# Patient Record
Sex: FEMALE | Race: WHITE | NOT HISPANIC OR LATINO | Employment: OTHER | URBAN - METROPOLITAN AREA
[De-identification: names, ages, dates, MRNs, and addresses within clinical notes are randomized per-mention and may not be internally consistent; named-entity substitution may affect disease eponyms.]

---
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
## 2018-02-14 NOTE — PROGRESS NOTES
"  SUBJECTIVE:   Cher Ibarra is a 17 year old female with Down's who presents to clinic today with her parents for the following health issues:    Concern - callus on R foot  Onset: 6 months    Description:   Lump on R foot with mild pain    Intensity: moderate    Progression of Symptoms:  constant    Accompanying Signs & Symptoms:  none    Previous history of similar problem:   none    Precipitating factors:   Worsened by: none    Alleviating factors:  Improved by: none    Therapies Tried and outcome: none    ROS:  CONSTITUTIONAL: NEGATIVE for fever, chills, change in weight  INTEGUMENTARY/SKIN: NEGATIVE for worrisome rashes, moles or lesions   female: dysmenorrhea   MUSCULOSKELETAL: NEGATIVE for significant arthralgias or myalgia    OBJECTIVE:     /75  Pulse 83  Temp 98.3  F (36.8  C) (Oral)  Ht 4' 11\" (1.499 m)  Wt 149 lb 5 oz (67.7 kg)  SpO2 96%  BMI 30.16 kg/m2  Body mass index is 30.16 kg/(m^2).  GENERAL: alert and no distress  MS: extremities normal- no gross deformities noted  SKIN: no suspicious lesions or rashes  PSYCH: mentation appears normal, affect normal/bright    Diagnostic Test Results:  none     ASSESSMENT/PLAN:   (L84) Callus of foot  (primary encounter diagnosis)  Plan: Reassurance provided. See AVS. Return to clinic as needed     (Q90.9) Down's syndrome  Plan: continue cares; TSH at future visit     (N94.6) Dysmenorrhea  Plan: IUD per gynecology       See Patient Instructions    Paloma Diaz MD  [facility]      "

## 2018-02-14 NOTE — PATIENT INSTRUCTIONS
[facility]    If you have any questions regarding to your visit please contact your care team:       Team Red:   Clinic Hours Telephone Number   Dr. Paloma Covarrubias, NP   7am-7pm  Monday - Thursday   7am-5pm  Fridays  [phone]  (Appointment scheduling available 24/7)    Questions about your visit?   Team Line  [phone]   [facility] [place] [facility] 11am-9pm Monday-Friday Saturday-Sunday- 9am-5pm   [place] - 5pm-9pm Monday-Friday Saturday-Sunday- 9am-5pm  [phone] - [place]   [phone] - [place]       What options do I have for visits at the clinic other than the traditional office visit?  To expand how we care for you, many of our providers are utilizing electronic visits (e-visits) and telephone visits, when medically appropriate, for interactions with their patients rather than a visit in the clinic.   We also offer nurse visits for many medical concerns. Just like any other service, we will bill your insurance company for this type of visit based on time spent on the phone with your provider. Not all insurance companies cover these visits. Please check with your medical insurance if this type of visit is covered. You will be responsible for any charges that are not paid by your insurance.      E-visits via [employer]:  generally incur a $35.00 fee.  Telephone visits:  Time spent on the phone: *charged based on time that is spent on the phone in increments of 10 minutes. Estimated cost:   5-10 mins $30.00   11-20 mins. $59.00   21-30 mins. $85.00     Use [employer] (secure email communication and access to your chart) to send your primary care provider a message or make an appointment. Ask someone on your Team how to sign up for [employer].  For a Price Quote for your services, please call our Consumer Price Line at [phone].      As always, Thank you for trusting us with your health care needs!    What Are  Corns and Calluses?    Corns and calluses are your body s response to friction or pressure against the skin. If your foot rubs the inside of your shoe, the affected area of skin thickens. Or, if a bone is not in the normal position, skin caught between bone and shoe or bone and ground builds up. In either case, the outer layer of skin thickens to protect the foot from unusual pressure. In many cases, corns and calluses look bad but are not harmful. However, more severe corns and calluses may become infected, destroy healthy tissue, or affect foot movement.    Corns  Corns usually grow on top of the foot, often at the toe joint. Corns can range from a slight thickening of skin to a painful soft or hard bump. They often form on top of buckled toe joints (hammer toes). If your toes curl under, corns may grow on the tips of the toes. You may also get a corn on the end of a toe if it rubs against your shoe. Corns can also grow between toes, often between the first and second toes.    Calluses  Calluses grow on the bottom of the foot or on the outer edge of a toe or heel. A callus may spread across the ball of your foot. This type of callus is usually due to a problem with a metatarsal (the long bone at the base of a toe, near the ball of the foot). A pinch callus may grow along the outer edge of the heel or the big toe. Some calluses press up into the foot instead of spreading on the outside. A callus may form a central core or plug of tissue where pressure is greatest.  Date Last Reviewed: 9/21/2015 2000-2017 [employer]. [address] [place] [address]. All rights reserved. This information is not intended as a substitute for professional medical care. Always follow your healthcare professional's instructions.

## 2018-02-19 ENCOUNTER — OFFICE VISIT (OUTPATIENT)
Dept: FAMILY MEDICINE | Facility: CLINIC | Age: 18
End: 2018-02-19
Payer: COMMERCIAL

## 2018-02-19 VITALS
HEART RATE: 83 BPM | OXYGEN SATURATION: 96 % | DIASTOLIC BLOOD PRESSURE: 75 MMHG | WEIGHT: 149.31 LBS | BODY MASS INDEX: 30.1 KG/M2 | HEIGHT: 59 IN | TEMPERATURE: 98.3 F | SYSTOLIC BLOOD PRESSURE: 115 MMHG

## 2018-02-19 DIAGNOSIS — L84 CALLUS OF FOOT: Primary | ICD-10-CM

## 2018-02-19 DIAGNOSIS — N94.6 DYSMENORRHEA: ICD-10-CM

## 2018-02-19 DIAGNOSIS — Q90.9 DOWN'S SYNDROME: ICD-10-CM

## 2018-02-19 PROCEDURE — 99213 OFFICE O/P EST LOW 20 MIN: CPT | Performed by: FAMILY MEDICINE

## 2018-02-19 RX ORDER — AMMONIUM LACTATE 12 G/100G
CREAM TOPICAL 2 TIMES DAILY PRN
COMMUNITY
Start: 2018-02-19

## 2018-02-19 NOTE — NURSING NOTE
"Chief Complaint   Patient presents with     Establish Care     Derm Problem     possible cyst R foot       Initial /75  Pulse 83  Temp 98.3  F (36.8  C) (Oral)  Ht 4' 11\" (1.499 m)  Wt 149 lb 5 oz (67.7 kg)  SpO2 96%  BMI 30.16 kg/m2 Estimated body mass index is 30.16 kg/(m^2) as calculated from the following:    Height as of this encounter: 4' 11\" (1.499 m).    Weight as of this encounter: 149 lb 5 oz (67.7 kg).  Medication Reconciliation: complete   Payam Carrizales MA      "

## 2018-02-19 NOTE — MR AVS SNAPSHOT
After Visit Summary   2/19/2018    Cher Ibarra    MRN: [id number]           Patient Information     Date Of Birth          2000        Visit Information        Provider Department      2/19/2018 1:20 PM Paloma Diaz MD [facility]        Today's Diagnoses     Callus of foot    -  1    Down's syndrome        Dysmenorrhea          Care Instructions    [facility]    If you have any questions regarding to your visit please contact your care team:       Team Red:   Clinic Hours Telephone Number   Dr. Paloma Covarrubias, NP   7am-7pm  Monday - Thursday   7am-5pm  Fridays  [phone]  (Appointment scheduling available 24/7)    Questions about your visit?   Team Line  [phone]   Urgent Care - [place] [facility] and [facility] - 11am-9pm Monday-Friday Saturday-Sunday- 9am-5pm   [place] - 5pm-9pm Monday-Friday Saturday-Sunday- 9am-5pm  [phone] - [place]   [phone] - [place]       What options do I have for visits at the clinic other than the traditional office visit?  To expand how we care for you, many of our providers are utilizing electronic visits (e-visits) and telephone visits, when medically appropriate, for interactions with their patients rather than a visit in the clinic.   We also offer nurse visits for many medical concerns. Just like any other service, we will bill your insurance company for this type of visit based on time spent on the phone with your provider. Not all insurance companies cover these visits. Please check with your medical insurance if this type of visit is covered. You will be responsible for any charges that are not paid by your insurance.      E-visits via Hundo:  generally incur a $35.00 fee.  Telephone visits:  Time spent on the phone: *charged based on time that is spent on the phone in increments of 10 minutes. Estimated cost:   5-10 mins $30.00   11-20 mins.  $59.00   21-30 mins. $85.00     Use [employer] (secure email communication and access to your chart) to send your primary care provider a message or make an appointment. Ask someone on your Team how to sign up for [employer].  For a Price Quote for your services, please call our Consumer Price Line at [phone].      As always, Thank you for trusting us with your health care needs!    What Are Corns and Calluses?    Corns and calluses are your body s response to friction or pressure against the skin. If your foot rubs the inside of your shoe, the affected area of skin thickens. Or, if a bone is not in the normal position, skin caught between bone and shoe or bone and ground builds up. In either case, the outer layer of skin thickens to protect the foot from unusual pressure. In many cases, corns and calluses look bad but are not harmful. However, more severe corns and calluses may become infected, destroy healthy tissue, or affect foot movement.    Corns  Corns usually grow on top of the foot, often at the toe joint. Corns can range from a slight thickening of skin to a painful soft or hard bump. They often form on top of buckled toe joints (hammer toes). If your toes curl under, corns may grow on the tips of the toes. You may also get a corn on the end of a toe if it rubs against your shoe. Corns can also grow between toes, often between the first and second toes.    Calluses  Calluses grow on the bottom of the foot or on the outer edge of a toe or heel. A callus may spread across the ball of your foot. This type of callus is usually due to a problem with a metatarsal (the long bone at the base of a toe, near the ball of the foot). A pinch callus may grow along the outer edge of the heel or the big toe. Some calluses press up into the foot instead of spreading on the outside. A callus may form a central core or plug of tissue where pressure is greatest.  Date Last Reviewed: 9/21/2015 2000-2017 [employer]  "[employer] [address] [place] [address]. All rights reserved. This information is not intended as a substitute for professional medical care. Always follow your healthcare professional's instructions.                Follow-ups after your visit        Follow-up notes from your care team     Return if symptoms worsen or fail to improve, for physical.      Who to contact     If you have questions or need follow up information about today's clinic visit or your schedule please contact [facility] directly at [phone].  Normal or non-critical lab and imaging results will be communicated to you by [employer], letter or phone within 4 business days after the clinic has received the results. If you do not hear from us within 7 days, please contact the clinic through [employer] or phone. If you have a critical or abnormal lab result, we will notify you by phone as soon as possible.  Submit refill requests through [employer] or call your pharmacy and they will forward the refill request to us. Please allow 3 business days for your refill to be completed.          Additional Information About Your Visit        [employer] Information     [employer] lets you send messages to your doctor, view your test results, renew your prescriptions, schedule appointments and more. To sign up, go to [email or web address] . Click on \"Log in\" on the left side of the screen, which will take you to the Welcome page. Then click on \"Sign up Now\" on the right side of the page.     You will be asked to enter the access code listed below, as well as some personal information. Please follow the directions to create your username and password.     Your access code is: [email or web address]  Expires: 2018  1:48 PM     Your access code will  in 90 days. If you need help or a new code, please call your [facility] clinic or [phone].        Care EveryWhere ID     This is your Care EveryWhere ID. This could be used by other organizations to " "access your [place] medical records  [id number]        Your Vitals Were     Pulse Temperature Height Pulse Oximetry BMI (Body Mass Index)       83 98.3  F (36.8  C) (Oral) 4' 11\" (1.499 m) 96% 30.16 kg/m2        Blood Pressure from Last 3 Encounters:   02/19/18 115/75   10/09/17 106/60   10/06/17 111/58    Weight from Last 3 Encounters:   02/19/18 149 lb 5 oz (67.7 kg) (59 %)*   10/09/17 150 lb 12.8 oz (68.4 kg) (63 %)*   10/06/17 150 lb (68 kg) (62 %)*     * Growth percentiles are based on Down Syndrome (2-20 Years) data.              Today, you had the following     No orders found for display       Primary Care Provider Office Phone # Fax #    Paloma Diaz [phone]       [address] [facility] [address]        Equal Access to Services     [facility]: Hadii aad ku hadasho Soomaali, waaxda luqadaha, qaybta kaalmada adeegyada, waxay laureen orellana . So [facility] [phone].    ATENCIÓN: Si habla español, tiene a wadsworth disposición servicios gratuitos de asistencia lingüística. Llame al [phone].    We comply with applicable federal civil rights laws and [place] laws. We do not discriminate on the basis of race, color, national origin, age, disability, sex, sexual orientation, or gender identity.            Thank you!     Thank you for choosing [facility]  for your care. Our goal is always to provide you with excellent care. Hearing back from our patients is one way we can continue to improve our services. Please take a few minutes to complete the written survey that you may receive in the mail after your visit with us. Thank you!             Your Updated Medication List - Protect others around you: Learn how to safely use, store and throw away your medicines at www.disposemymeds.org.          This list is accurate as of 2/19/18  1:48 PM.  Always use your most recent med list.                   Brand Name Dispense Instructions for use Diagnosis    " FOLBECAL 1 MG Tabs      Take 1 tablet by mouth daily        LAC-HYDRIN 12 % cream   Generic drug:  ammonium lactate      Apply topically 2 times daily as needed for dry skin        loratadine 10 MG tablet    CLARITIN     Take 10 mg by mouth daily        melatonin 3 MG tablet      None Entered        mupirocin 2 % ointment    BACTROBAN    30 g    Apply topically 3 times daily as needed    H/O furuncle        MG Caps capsule   Generic drug:  acetylcysteine      Take 500 mg by mouth daily        naproxen 500 MG tablet    NAPROSYN    60 tablet    Take 1 tablet (500 mg) by mouth 2 times daily as needed    Dysmenorrhea       OMEGA-3 FISH OIL PO      Take 1 g by mouth daily        PRISTIQ 25 MG 24 hr tablet   Generic drug:  desvenlafaxine succinate      Take 25 mg by mouth daily        traZODone 50 MG tablet    DESYREL    90 tablet    Take 0.5 tablets (25 mg) by mouth At Bedtime    Sleep disturbance       triamcinolone 0.5 % cream    KENALOG    30 g    Apply  topically 2 times daily. to affected area as directed.    Skin rash       Vitamin D (Cholecalciferol) 1000 UNITS Tabs

## 2018-03-05 ENCOUNTER — OFFICE VISIT (OUTPATIENT)
Dept: FAMILY MEDICINE | Facility: CLINIC | Age: 18
End: 2018-03-05
Payer: COMMERCIAL

## 2018-03-05 VITALS
DIASTOLIC BLOOD PRESSURE: 62 MMHG | OXYGEN SATURATION: 96 % | HEART RATE: 107 BPM | TEMPERATURE: 98 F | WEIGHT: 153.5 LBS | BODY MASS INDEX: 30.95 KG/M2 | HEIGHT: 59 IN | SYSTOLIC BLOOD PRESSURE: 90 MMHG

## 2018-03-05 DIAGNOSIS — E66.9 OBESITY WITHOUT SERIOUS COMORBIDITY, UNSPECIFIED CLASSIFICATION, UNSPECIFIED OBESITY TYPE: ICD-10-CM

## 2018-03-05 DIAGNOSIS — Z23 NEED FOR VACCINATION: ICD-10-CM

## 2018-03-05 DIAGNOSIS — Q90.9 DOWN'S SYNDROME: ICD-10-CM

## 2018-03-05 DIAGNOSIS — F41.1 GENERALIZED ANXIETY DISORDER: ICD-10-CM

## 2018-03-05 DIAGNOSIS — N94.6 DYSMENORRHEA: ICD-10-CM

## 2018-03-05 DIAGNOSIS — Z01.818 PREOP GENERAL PHYSICAL EXAM: Primary | ICD-10-CM

## 2018-03-05 PROCEDURE — 99214 OFFICE O/P EST MOD 30 MIN: CPT | Performed by: FAMILY MEDICINE

## 2018-03-05 RX ORDER — DESVENLAFAXINE 25 MG/1
25 TABLET, EXTENDED RELEASE ORAL DAILY
Qty: 90 TABLET | Refills: 3 | Status: SHIPPED | OUTPATIENT
Start: 2018-03-05 | End: 2019-08-13

## 2018-03-05 RX ORDER — TRAZODONE HYDROCHLORIDE 50 MG/1
25 TABLET, FILM COATED ORAL AT BEDTIME
Qty: 45 TABLET | Refills: 3 | Status: SHIPPED | OUTPATIENT
Start: 2018-03-05 | End: 2019-08-13

## 2018-03-05 ASSESSMENT — PAIN SCALES - GENERAL: PAINLEVEL: NO PAIN (0)

## 2018-03-05 NOTE — PROGRESS NOTES
[facility]  [address] [place] [address]  [phone]  Dept: [phone]    PRE-OP EVALUATION:  Today's date: 3/5/2018    Cher Ibarra (: 2000) presents for pre-operative evaluation assessment as requested by Dr. Parkinson.  She requires evaluation and anesthesia risk assessment prior to undergoing surgery/procedure for treatment of IUD placement.    Proposed Surgery/ Procedure: IUD Placement  Date of Surgery/ Procedure: 3/15/2018   Time of Surgery/ Procedure: 8 am  Hospital/Surgical Facility: [facility]  Fax number for surgical facility: [phone]  Primary Physician: Paloma Diaz  Type of Anesthesia Anticipated: Local    Patient has a Health Care Directive or Living Will:  NO    1. YES - DO YOU HAVE A HISTORY OF HEART ATTACK, STROKE, STENT, BYPASS OR SURGERY ON AN ARTERY IN THE HEAD, NECK, HEART OR LEG? ASD and VSD repair   2. NO - Do you ever have any pain or discomfort in your chest?  3. NO - DO YOU HAVE A HISTORY OF HEART FAILURE   4. NO - Are you troubled by shortness of breath when: walking on the level, up a slight hill or at night?  5. NO - Do you currently have a cold, bronchitis or other respiratory infection?  6. NO - Do you have a cough, shortness of breath or wheezing?  7. NO - Do you sometimes get pains in the calves of your legs when you walk?  8. NO - Do you or anyone in your family have previous history of blood clots?  9. NO - Do you or does anyone in your family have a serious bleeding problem such as prolonged bleeding following surgeries or cuts?  10. YES - HAVE YOU EVER HAD PROBLEMS WITH ANEMIA OR BEEN TOLD TO TAKE IRON PILLS?    11. NO - Have you had any abnormal blood loss such as black, tarry or bloody stools, or abnormal vaginal bleeding?  12. NO - Have you ever had a blood transfusion?  13. YES - HAVE YOU OR ANY OF YOUR RELATIVES EVER HAD PROBLEMS WITH ANESTHESIA?  Mat GM with nausea, mother with nausea   14. NO - Do you have sleep  apnea, excessive snoring or daytime drowsiness?  15. NO - Do you have any prosthetic heart valves?  16. NO - Do you have prosthetic joints?  17. NO - Is there any chance that you may be pregnant?    HPI:   Cher Ibarra is a 17 year old female who presents with dysmenorrhea. Symptom onset has been unchanged for a time period of years. Severity is described as moderate. Course of her symptoms over time is unchanged.    History difficult to obtain due to patient's mental status.    See problem list for active medical problems.  Problems all longstanding and stable, except as noted/documented.  See ROS for pertinent symptoms related to these conditions.                                                                                                  .    MEDICAL HISTORY:     Patient Active Problem List    Diagnosis Date Noted     Obesity 03/05/2018     Priority: Medium     Recurrent boils 07/05/2017     Priority: Medium     Anxiety disorder 07/07/2015     Priority: Medium     Dysmenorrhea 02/24/2014     Priority: Medium     [facility] and started on naprosyn, much help.       Chronic rhinitis 07/02/2012     Priority: Medium     Down's syndrome 11/22/2010     Priority: Medium     [facility]       Sleep disturbance 11/22/2010     Priority: Medium      Past Medical History:   Diagnosis Date     Anxiety      ASD (atrial septal defect) 2000    Repair of ASD/VSD     Chronic rhinitis      Down syndrome      Keratosis pilaris      Obesity 3/5/2018     Recurrent boils      Past Surgical History:   Procedure Laterality Date     REPAIR ATRIAL SEPTAL DEFECT      age 1     REPAIR VENTRICULAR SEPTAL DEFECT      age 1     TONSILLECTOMY & ADENOIDECTOMY      AGE 3     Current Outpatient Prescriptions   Medication Sig Dispense Refill     traZODone (DESYREL) 50 MG tablet Take 0.5 tablets (25 mg) by mouth At Bedtime 45 tablet 3     desvenlafaxine  succinate (PRISTIQ) 25 MG 24 hr tablet Take 1 tablet (25 mg) by mouth daily 90 tablet 3     ammonium lactate (LAC-HYDRIN) 12 % cream Apply topically 2 times daily as needed for dry skin       acetylcysteine (NAC) 500 MG CAPS capsule Take 500 mg by mouth daily       mupirocin (BACTROBAN) 2 % ointment Apply topically 3 times daily as needed 30 g 6     triamcinolone (KENALOG) 0.5 % cream Apply  topically 2 times daily. to affected area as directed. 30 g 0     loratadine (CLARITIN) 10 MG tablet Take 10 mg by mouth daily       Vitamin D, Cholecalciferol, 1000 UNITS TABS        naproxen (NAPROSYN) 500 MG tablet Take 1 tablet (500 mg) by mouth 2 times daily as needed 60 tablet 1     Omega-3 Fatty Acids (OMEGA-3 FISH OIL PO) Take 1 g by mouth daily       MELATONIN 3 MG OR TABS None Entered       Prenatal Ca Carb-B6-B12-FA (FOLBECAL) 1 MG TABS Take 1 tablet by mouth daily       OTC products: None, except as noted above    Allergies   Allergen Reactions     Zithromax [Azithromycin Dihydrate]       Latex Allergy: NO    Social History   Substance Use Topics     Smoking status: Never Smoker     Smokeless tobacco: Never Used     Alcohol use No     History   Drug Use No     Social History     Social History     Marital status: Single     Spouse name: N/A     Number of children: 0     Years of education: N/A     Occupational History      Student     Social History Main Topics     Smoking status: Never Smoker     Smokeless tobacco: Never Used     Alcohol use No     Drug use: No     Sexual activity: No     Other Topics Concern     Parent/Sibling W/ Cabg, Mi Or Angioplasty Before 65f 55m? No     Social History Narrative     Family History   Problem Relation Age of Onset     CEREBROVASCULAR DISEASE Mother      Depression Mother      Anxiety Disorder Mother      Obesity Mother      Anesthesia Reaction Maternal Grandmother      C.A.D. No family hx of      DIABETES No family hx of      Hypertension No family hx of      Breast Cancer No  "family hx of      Prostate Cancer No family hx of      Asthma No family hx of       REVIEW OF SYSTEMS:   CONSTITUTIONAL: NEGATIVE for fever, chills, change in weight  INTEGUMENTARY/SKIN: NEGATIVE for worrisome rashes, moles or lesions  EYES: NEGATIVE for vision changes or irritation  ENT/MOUTH: NEGATIVE for ear, mouth and throat problems  RESP: NEGATIVE for significant cough or SOB  CV: NEGATIVE for chest pain, palpitations or peripheral edema  GI: NEGATIVE for nausea, abdominal pain, heartburn, or change in bowel habits   female: dysmenorrhea  MUSCULOSKELETAL: NEGATIVE for significant arthralgias or myalgia  NEURO: NEGATIVE for weakness, dizziness or paresthesias  ENDOCRINE: NEGATIVE for temperature intolerance, skin/hair changes  HEME: NEGATIVE for bleeding problems  PSYCHIATRIC: HX anxiety  Complete ROS otherwise negative.     EXAM:   BP 90/62  Pulse 107  Temp 98  F (36.7  C) (Oral)  Ht 4' 11\" (1.499 m)  Wt 153 lb 8 oz (69.6 kg)  LMP 03/04/2018  SpO2 96%  BMI 31 kg/m2    GENERAL APPEARANCE: alert, no distress and over weight     EYES: EOMI, PERRL     HENT: ear canals and TM's normal and nose and mouth without ulcers or lesions     NECK: no adenopathy, no asymmetry, masses, or scars and thyroid normal to palpation     RESP: lungs clear to auscultation - no rales, rhonchi or wheezes     CV: regular rates and rhythm, normal S1 S2, no S3 or S4 and no murmur, click or rub     ABDOMEN:  soft, nontender, no HSM or masses and bowel sounds normal     MS: small ganglion cyst left wrist, no evidence of inflammation in joints, FROM in all extremities.     SKIN: no suspicious lesions or rashes     NEURO: MR      PSYCH: affect normal/bright     LYMPHATICS: No cervical adenopathy    DIAGNOSTICS:   EKG: Not indicated due to non-vascular surgery and low risk of event (age <65 and without cardiac risk factors)    Recent Labs   Lab Test 10/15/14   NA  139   POTASSIUM  3.5   CR  1.02        IMPRESSION:   Reason for " surgery/procedure: dysmenorrhea   Diagnosis/reason for consult: Down's     The proposed surgical procedure is considered LOW risk.    REVISED CARDIAC RISK INDEX  The patient has the following serious cardiovascular risks for perioperative complications such as (MI, PE, VFib and 3  AV Block):  No serious cardiac risks  INTERPRETATION: 0 risks: Class I (very low risk - 0.4% complication rate)    The patient has the following additional risks for perioperative complications:  No identified additional risks      ICD-10-CM    1. Preop general physical exam Z01.818    2. Dysmenorrhea N94.6    3. Generalized anxiety disorder F41.1 traZODone (DESYREL) 50 MG tablet     desvenlafaxine succinate (PRISTIQ) 25 MG 24 hr tablet   4. Down's syndrome Q90.9 TSH WITH FREE T4 REFLEX   5. Need for vaccination Z23 HUMAN PAPILLOMAVIRUS VACCINE     FLU VACCINE, 3 YRS +, IM (FLUZONE)   6. Obesity without serious comorbidity, unspecified classification, unspecified obesity type E66.9 Lipid panel reflex to direct LDL Fasting       RECOMMENDATIONS:   --Patient is to take all scheduled medications on the day of surgery EXCEPT for modifications listed below.    Anticoagulant or Antiplatelet Medication Use  NSAIDS: Naproxen (Naprosyn):   Stop 2-3 days prior to surgery        APPROVAL GIVEN to proceed with proposed procedure, without further diagnostic evaluation       Signed Electronically by: Paloma Diaz MD    Copy of this evaluation report is provided to requesting physician.    [place] Preop Guidelines

## 2018-03-05 NOTE — PATIENT INSTRUCTIONS
Before Your Surgery      Call your surgeon if there is any change in your health. This includes signs of a cold or flu (such as a sore throat, runny nose, cough, rash or fever).    Do not smoke, drink alcohol or take over the counter medicine (unless your surgeon or primary care doctor tells you to) for the 24 hours before and after surgery.    If you take prescribed drugs: Follow your doctor s orders about which medicines to take and which to stop until after surgery.    Eating and drinking prior to surgery: follow the instructions from your surgeon    Take a shower or bath the night before surgery. Use the soap your surgeon gave you to gently clean your skin. If you do not have soap from your surgeon, use your regular soap. Do not shave or scrub the surgery site.  Wear clean pajamas and have clean sheets on your bed.       [facility]    If you have any questions regarding to your visit please contact your care team:       Team Red:   Clinic Hours Telephone Number   Dr. Paloma Covarrubias NP   7am-7pm  Monday - Thursday   7am-5pm  Fridays  [phone]  (Appointment scheduling available 24/7)    Questions about your visit?   Team Line  [phone]   Urgent Care - [place] - 11am-9pm Monday-Friday Saturday-Sunday- 9am-5pm   [place] - 5pm-9pm Monday-Friday Saturday-Sunday- 9am-5pm  [phone] - [place]   [phone] - [place]       What options do I have for visits at the clinic other than the traditional office visit?  To expand how we care for you, many of our providers are utilizing electronic visits (e-visits) and telephone visits, when medically appropriate, for interactions with their patients rather than a visit in the clinic.   We also offer nurse visits for many medical concerns. Just like any other service, we will bill your insurance company for this type of visit based on time spent on the phone with  your provider. Not all insurance companies cover these visits. Please check with your medical insurance if this type of visit is covered. You will be responsible for any charges that are not paid by your insurance.      E-visits via TicketmasterharGauzy:  generally incur a $35.00 fee.  Telephone visits:  Time spent on the phone: *charged based on time that is spent on the phone in increments of 10 minutes. Estimated cost:   5-10 mins $30.00   11-20 mins. $59.00   21-30 mins. $85.00     Use [employer] (secure email communication and access to your chart) to send your primary care provider a message or make an appointment. Ask someone on your Team how to sign up for [employer].  For a Price Quote for your services, please call our [employer] Price Line at [phone].      As always, Thank you for trusting us with your health care needs!    Ganglion Cyst: Hand    A ganglion cyst is a firm, fluid-filled lump that can suddenly appear on the front or back of the wrist or at the base of a finger. These cysts grow from normal tissue in the wrist and fingers, and range in size from a pea to a peach pit. Although ganglion cysts are common, they don t spread, and they don t become cancerous. They can occur after an injury, but many times it isn t known why they grow. Ganglion cysts can change in size, and may go away on their own.  Symptoms  A ganglion cyst is sometimes painful, especially when it first occurs. Constantly using your hand or wrist can make the cyst enlarge and hurt more. Some hand and wrist movements, such as grasping things, may also be difficult.  How a ganglion cyst develops  Your wrist and hand are made up of many small bones that meet at joints. Tendons attach muscles to the bones at the joints. The tendons allow the joints to bend and straighten. Both tendons and joints are lined with tissue called synovium. This tissue makes a thick fluid that keeps the joints and tendons moving easily. Sometimes the tissue balloons out  from the joint or tendons and forms a cyst. As the cyst fills with fluid and grows, it appears as a lump you can feel.  Where ganglion cysts occur  A ganglion cyst can occur anywhere on the hand near a joint. Cysts most commonly appear on the back or palm side of the wrist, or on the palm at the base of a finger. Your doctor can usually diagnose a cyst by examining the lump. He or she may draw off a little fluid or order an X-ray to rule out other problems.  Treating a ganglion cyst  Your healthcare provider may just watch your ganglion cyst. Many shrink and become painless without treatment. Some disappear altogether. If the cyst is unsightly or painful, or makes it hard for you to use your hand, your healthcare provider can treat it or, if needed, remove it surgically.  Nonsurgical treatment  To shrink the cyst, your provider may remove (aspirate) the fluid with a needle. If the cyst hurts, your provider may also give you an injection of an anti-inflammatory, such as cortisone, to relieve the irritation. Your hand may then be wrapped to help keep the cyst from recurring.  Surgery  If the cyst reappears after treatment, your healthcare provider may remove it surgically. A section of the tissue that lines the joint or tendon is removed along with the cyst. This helps prevent another cyst from forming, although recurrence of the cyst is still possible after surgery. Usually, only your hand or arm is numbed, and you can go home a few hours after surgery. Your hand may be in a splint for several days.  Date Last Reviewed: 9/10/2015    7878-0175 [employer]. [address] [place] [address], [place] [id number]. All rights reserved. This information is not intended as a substitute for professional medical care. Always follow your healthcare professional's instructions.      [facility]    If you have any questions regarding to your visit please contact your care team:       Team Red:   Clinic Hours  Telephone Number   Dr. Paloma Covarrubias, NP   7am-7pm  Monday - Thursday   7am-5pm  Fridays  [phone]  (Appointment scheduling available 24/7)    Questions about your visit?   Team Line  [phone]   Urgent Care - [place] - 11am-9pm Monday-Friday Saturday-Sunday- 9am-5pm   [place] - 5pm-9pm Monday-Friday Saturday-Sunday- 9am-5pm  [phone] - [place]   [phone] - [place]       What options do I have for visits at the clinic other than the traditional office visit?  To expand how we care for you, many of our providers are utilizing electronic visits (e-visits) and telephone visits, when medically appropriate, for interactions with their patients rather than a visit in the clinic.   We also offer nurse visits for many medical concerns. Just like any other service, we will bill your insurance company for this type of visit based on time spent on the phone with your provider. Not all insurance companies cover these visits. Please check with your medical insurance if this type of visit is covered. You will be responsible for any charges that are not paid by your insurance.      E-visits via Sjapper:  generally incur a $35.00 fee.  Telephone visits:  Time spent on the phone: *charged based on time that is spent on the phone in increments of 10 minutes. Estimated cost:   5-10 mins $30.00   11-20 mins. $59.00   21-30 mins. $85.00     Use [employer] (secure email communication and access to your chart) to send your primary care provider a message or make an appointment. Ask someone on your Team how to sign up for [employer].  For a Price Quote for your services, please call our Consumer Price Line at [phone].      As always, Thank you for trusting us with your health care needs!    Raheem Gordillo CMA

## 2018-03-05 NOTE — Clinical Note
Please fax to [facility] Fax number for surgical facility: [phone]  with labs, EKG, and xray reports if done. Paloma Diaz MD

## 2018-03-05 NOTE — MR AVS SNAPSHOT
After Visit Summary   3/5/2018    Cher Ibarra    MRN: [id number]           Patient Information     Date Of Birth          2000        Visit Information        Provider Department      3/5/2018 2:00 PM Paloma Diaz MD [facility]        Today's Diagnoses     Preop general physical exam    -  1    Dysmenorrhea        Generalized anxiety disorder        Down's syndrome        Need for vaccination        Obesity without serious comorbidity, unspecified classification, unspecified obesity type          Care Instructions      Before Your Surgery      Call your surgeon if there is any change in your health. This includes signs of a cold or flu (such as a sore throat, runny nose, cough, rash or fever).    Do not smoke, drink alcohol or take over the counter medicine (unless your surgeon or primary care doctor tells you to) for the 24 hours before and after surgery.    If you take prescribed drugs: Follow your doctor s orders about which medicines to take and which to stop until after surgery.    Eating and drinking prior to surgery: follow the instructions from your surgeon    Take a shower or bath the night before surgery. Use the soap your surgeon gave you to gently clean your skin. If you do not have soap from your surgeon, use your regular soap. Do not shave or scrub the surgery site.  Wear clean pajamas and have clean sheets on your bed.       [facility]    If you have any questions regarding to your visit please contact your care team:       Team Red:   Clinic Hours Telephone Number   Dr. Paloma Covarrubias, NP   7am-7pm  Monday - Thursday   7am-5pm  Fridays  [phone]  (Appointment scheduling available 24/7)    Questions about your visit?   Team Line  [phone]   Urgent Care - [place] and [place] - 11am-9pm Monday-Friday Saturday-Sunday- 9am-5pm   [place] - 5pm-9pm Monday-Friday  Saturday-Sunday- 9am-5pm  [phone] - [place]   [phone] - [place]       What options do I have for visits at the clinic other than the traditional office visit?  To expand how we care for you, many of our providers are utilizing electronic visits (e-visits) and telephone visits, when medically appropriate, for interactions with their patients rather than a visit in the clinic.   We also offer nurse visits for many medical concerns. Just like any other service, we will bill your insurance company for this type of visit based on time spent on the phone with your provider. Not all insurance companies cover these visits. Please check with your medical insurance if this type of visit is covered. You will be responsible for any charges that are not paid by your insurance.      E-visits via Miappi:  generally incur a $35.00 fee.  Telephone visits:  Time spent on the phone: *charged based on time that is spent on the phone in increments of 10 minutes. Estimated cost:   5-10 mins $30.00   11-20 mins. $59.00   21-30 mins. $85.00     Use [employer] (secure email communication and access to your chart) to send your primary care provider a message or make an appointment. Ask someone on your Team how to sign up for [employer].  For a Price Quote for your services, please call our Consumer Price Line at [phone].      As always, Thank you for trusting us with your health care needs!    Ganglion Cyst: Hand    A ganglion cyst is a firm, fluid-filled lump that can suddenly appear on the front or back of the wrist or at the base of a finger. These cysts grow from normal tissue in the wrist and fingers, and range in size from a pea to a peach pit. Although ganglion cysts are common, they don t spread, and they don t become cancerous. They can occur after an injury, but many times it isn t known why they grow. Ganglion cysts can change in size, and may go away on their own.  Symptoms  A ganglion cyst is sometimes painful,  especially when it first occurs. Constantly using your hand or wrist can make the cyst enlarge and hurt more. Some hand and wrist movements, such as grasping things, may also be difficult.  How a ganglion cyst develops  Your wrist and hand are made up of many small bones that meet at joints. Tendons attach muscles to the bones at the joints. The tendons allow the joints to bend and straighten. Both tendons and joints are lined with tissue called synovium. This tissue makes a thick fluid that keeps the joints and tendons moving easily. Sometimes the tissue balloons out from the joint or tendons and forms a cyst. As the cyst fills with fluid and grows, it appears as a lump you can feel.  Where ganglion cysts occur  A ganglion cyst can occur anywhere on the hand near a joint. Cysts most commonly appear on the back or palm side of the wrist, or on the palm at the base of a finger. Your doctor can usually diagnose a cyst by examining the lump. He or she may draw off a little fluid or order an X-ray to rule out other problems.  Treating a ganglion cyst  Your healthcare provider may just watch your ganglion cyst. Many shrink and become painless without treatment. Some disappear altogether. If the cyst is unsightly or painful, or makes it hard for you to use your hand, your healthcare provider can treat it or, if needed, remove it surgically.  Nonsurgical treatment  To shrink the cyst, your provider may remove (aspirate) the fluid with a needle. If the cyst hurts, your provider may also give you an injection of an anti-inflammatory, such as cortisone, to relieve the irritation. Your hand may then be wrapped to help keep the cyst from recurring.  Surgery  If the cyst reappears after treatment, your healthcare provider may remove it surgically. A section of the tissue that lines the joint or tendon is removed along with the cyst. This helps prevent another cyst from forming, although recurrence of the cyst is still possible  after surgery. Usually, only your hand or arm is numbed, and you can go home a few hours after surgery. Your hand may be in a splint for several days.  Date Last Reviewed: 9/10/2015    [phone] [employer]. [address] [place] [id number]. All rights reserved. This information is not intended as a substitute for professional medical care. Always follow your healthcare professional's instructions.      [facility]    If you have any questions regarding to your visit please contact your care team:       Team Red:   Clinic Hours Telephone Number   Dr. Paloma Covarrubias, NP   7am-7pm  Monday - Thursday   7am-5pm  Fridays  [phone]  (Appointment scheduling available 24/7)    Questions about your visit?   Team Line  [phone]   [facility] [place] [facility] 11am-9pm Monday-Friday Saturday-Sunday- 9am-5pm   [place] - 5pm-9pm Monday-Friday Saturday-Sunday- 9am-5pm  [phone] - [facility]  [phone] - [place]       What options do I have for visits at the clinic other than the traditional office visit?  To expand how we care for you, many of our providers are utilizing electronic visits (e-visits) and telephone visits, when medically appropriate, for interactions with their patients rather than a visit in the clinic.   We also offer nurse visits for many medical concerns. Just like any other service, we will bill your insurance company for this type of visit based on time spent on the phone with your provider. Not all insurance companies cover these visits. Please check with your medical insurance if this type of visit is covered. You will be responsible for any charges that are not paid by your insurance.      E-visits via [employer]:  generally incur a $35.00 fee.  Telephone visits:  Time spent on the phone: *charged based on time that is spent on the phone in increments of 10 minutes. Estimated  "cost:   5-10 mins $30.00   11-20 mins. $59.00   21-30 mins. $85.00     Use [employer] (secure email communication and access to your chart) to send your primary care provider a message or make an appointment. Ask someone on your Team how to sign up for [employer].  For a Price Quote for your services, please call our [employer] Line at [phone].      As always, Thank you for trusting us with your health care needs!    Raheem Gordillo CMA            Follow-ups after your visit        Follow-up notes from your care team     Return if symptoms worsen or fail to improve.      Future tests that were ordered for you today     Open Future Orders        Priority Expected Expires Ordered    HUMAN PAPILLOMAVIRUS VACCINE Routine  3/5/2019 3/5/2018    FLU VACCINE, 3 YRS +, IM (FLUZONE) Routine  3/5/2019 3/5/2018    Lipid panel reflex to direct LDL Fasting Routine  3/5/2019 3/5/2018    TSH WITH FREE T4 REFLEX Routine  3/5/2019 3/5/2018            Who to contact     If you have questions or need follow up information about today's clinic visit or your schedule please contact [facility] directly at [phone].  Normal or non-critical lab and imaging results will be communicated to you by MyChart, letter or phone within 4 business days after the clinic has received the results. If you do not hear from us within 7 days, please contact the clinic through Auxmoneyhart or phone. If you have a critical or abnormal lab result, we will notify you by phone as soon as possible.  Submit refill requests through [employer] or call your pharmacy and they will forward the refill request to us. Please allow 3 business days for your refill to be completed.          Additional Information About Your Visit        AuxmoneyharCliq Information     [employer] lets you send messages to your doctor, view your test results, renew your prescriptions, schedule appointments and more. To sign up, go to [email or web address] . Click on \"Log in\" on the left " "side of the screen, which will take you to the Welcome page. Then click on \"Sign up Now\" on the right side of the page.     You will be asked to enter the access code listed below, as well as some personal information. Please follow the directions to create your username and password.     Your access code is: [email or web address]  Expires: 2018  1:48 PM     Your access code will  in 90 days. If you need help or a new code, please call your [facility] clinic or [phone].        Care EveryWhere ID     This is your Care EveryWhere ID. This could be used by other organizations to access your [facility] medical records  [id number]        Your Vitals Were     Pulse Temperature Height Last Period Pulse Oximetry Breastfeeding?    107 98  F (36.7  C) (Oral) 4' 11\" (1.499 m) 2018 96% No    BMI (Body Mass Index)                   31 kg/m2            Blood Pressure from Last 3 Encounters:   18 90/62   18 115/75   10/09/17 106/60    Weight from Last 3 Encounters:   18 153 lb 8 oz (69.6 kg) (63 %)*   18 149 lb 5 oz (67.7 kg) (59 %)*   10/09/17 150 lb 12.8 oz (68.4 kg) (63 %)*     * Growth percentiles are based on Down Syndrome (2-20 Years) data.                 Where to get your medicines      These medications were sent to [facility] [address] [place] [address] [address] [place] [address]  [address], [facility] [address]    Hours:  Test fax successful 02   Phone:  [phone]     desvenlafaxine succinate 25 MG 24 hr tablet    traZODone 50 MG tablet          Primary Care Provider Office Phone # Fax #    Paloma Diaz [phone] [address]  [facility] [address]        Equal Access to Services     DAYTON GARRIDO AH: Hadii annika Sanchez, hemal schwartz, qaybta kacliff arnold. [facility] [phone].    ATENCIÓN: Si habla edmundo, tiene a wadsworth disposición servicios " mary de asistencia lingüística. Jose patel [phone].    We comply with applicable federal civil rights laws and [place] laws. We do not discriminate on the basis of race, color, national origin, age, disability, sex, sexual orientation, or gender identity.            Thank you!     Thank you for choosing [facility]  for your care. Our goal is always to provide you with excellent care. Hearing back from our patients is one way we can continue to improve our services. Please take a few minutes to complete the written survey that you may receive in the mail after your visit with us. Thank you!             Your Updated Medication List - Protect others around you: Learn how to safely use, store and throw away your medicines at www.disposemymeds.org.          This list is accurate as of 3/5/18  2:52 PM.  Always use your most recent med list.                   Brand Name Dispense Instructions for use Diagnosis    desvenlafaxine succinate 25 MG 24 hr tablet    PRISTIQ    90 tablet    Take 1 tablet (25 mg) by mouth daily    Generalized anxiety disorder       FOLBECAL 1 MG Tabs      Take 1 tablet by mouth daily        LAC-HYDRIN 12 % cream   Generic drug:  ammonium lactate      Apply topically 2 times daily as needed for dry skin        loratadine 10 MG tablet    CLARITIN     Take 10 mg by mouth daily        melatonin 3 MG tablet      None Entered        mupirocin 2 % ointment    BACTROBAN    30 g    Apply topically 3 times daily as needed    H/O furuncle        MG Caps capsule   Generic drug:  acetylcysteine      Take 500 mg by mouth daily        naproxen 500 MG tablet    NAPROSYN    60 tablet    Take 1 tablet (500 mg) by mouth 2 times daily as needed    Dysmenorrhea       OMEGA-3 FISH OIL PO      Take 1 g by mouth daily        traZODone 50 MG tablet    DESYREL    45 tablet    Take 0.5 tablets (25 mg) by mouth At Bedtime    Generalized anxiety disorder       triamcinolone 0.5 % cream    KENALOG     30 g    Apply  topically 2 times daily. to affected area as directed.    Skin rash       Vitamin D (Cholecalciferol) 1000 UNITS Tabs

## 2018-04-30 ENCOUNTER — TELEPHONE (OUTPATIENT)
Dept: FAMILY MEDICINE | Facility: CLINIC | Age: 18
End: 2018-04-30

## 2018-04-30 NOTE — TELEPHONE ENCOUNTER
Spoke with patient mother and appointment made for May 1st with PCP for callus f/u.  No questions at this time.     Gunjan Vilchis RN

## 2018-04-30 NOTE — TELEPHONE ENCOUNTER
Mom is calling because she states patient was seen for a cellulitis on her foot and now it has changed in color and size. Mom would like to be advised on who patient  should see. Please follow up

## 2018-04-30 NOTE — TELEPHONE ENCOUNTER
"Spoke with patient mother regarding callus on heel of right foot.  Mother reports:  The callus has filled with fluid and is blue in color. The size has doubled since February and is not the size of a golf ball.  No redness, no dryness, no pain,  Just \"looks odd\"    Mother asking if patient should be referred somewhere or if patient should be seen again.   Patient last seen by PCP on 3/5/18.    Gunjan Vilchis RN        "

## 2018-05-01 ENCOUNTER — OFFICE VISIT (OUTPATIENT)
Dept: FAMILY MEDICINE | Facility: CLINIC | Age: 18
End: 2018-05-01
Payer: COMMERCIAL

## 2018-05-01 VITALS
HEIGHT: 59 IN | RESPIRATION RATE: 20 BRPM | TEMPERATURE: 97.3 F | HEART RATE: 76 BPM | BODY MASS INDEX: 31.04 KG/M2 | OXYGEN SATURATION: 96 % | SYSTOLIC BLOOD PRESSURE: 98 MMHG | DIASTOLIC BLOOD PRESSURE: 60 MMHG | WEIGHT: 154 LBS

## 2018-05-01 DIAGNOSIS — R22.41 FOOT MASS, RIGHT: Primary | ICD-10-CM

## 2018-05-01 DIAGNOSIS — K12.0 APHTHOUS ULCER OF MOUTH: ICD-10-CM

## 2018-05-01 PROCEDURE — 99213 OFFICE O/P EST LOW 20 MIN: CPT | Performed by: FAMILY MEDICINE

## 2018-05-01 NOTE — PROGRESS NOTES
"SUBJECTIVE:  Cher Ibarra is a 17 year old obese female with Down's syndrome who presents with her mother/caregiver with right heel mass, soft, painless, sometimes blue in color. Symptom onset has been sudden, worsening for a time period of months. Severity is described as mild and localized. Course of her symptoms over time is worsening. She also has some dry skin around her mouth and a lesion on her lower lip x 4 days.     OBJECTIVE:  EXAM:  BP 98/60  Pulse 76  Temp 97.3  F (36.3  C) (Oral)  Resp 20  Ht 4' 11\" (1.499 m)  Wt 154 lb (69.9 kg)  LMP 04/30/2018  SpO2 96%  Breastfeeding? No  BMI 31.1 kg/m2   Constitutional: alert, no distress, cooperative and obese   Psychiatric: mentation appears abnormal and affect flat  ENT: white plaque-like lesion on left lower lip with fissure close by  DERMATOLOGY: dry skin near left side of mouth   JOINT/EXTREMITIES: gait normal and soft mobile 2 cm mass on lateral aspect of right heel     ASSESSMENT/PLAN:  (R22.41) Foot mass, right  (primary encounter diagnosis)  Plan: PODIATRY/FOOT & ANKLE SURGERY REFERRAL          (K12.0) Aphthous ulcer of mouth  Plan: may use over-the-counter topical analgesic/anesthetic as needed and follow-up for persistence       Paloma Diaz MD     "

## 2018-05-01 NOTE — MR AVS SNAPSHOT
After Visit Summary   5/1/2018    Cher Ibarra    MRN: [id number]           Patient Information     Date Of Birth          2000        Visit Information        Provider Department      5/1/2018 2:40 PM Paloma Diaz MD [facility]        Today's Diagnoses     Foot mass, right    -  1    Aphthous ulcer of mouth          Care Instructions    [facility]    If you have any questions regarding to your visit please contact your care team:       Team Red:   Clinic Hours Telephone Number   Dr. Paloma Covarrubias, NP   7am-7pm  Monday - Thursday   7am-5pm  Fridays  [phone]  (Appointment scheduling available 24/7)    Questions about your recent visit?   Team Line  [phone]   Urgent Care - [place] and [facility] - 11am-9pm Monday-Friday Saturday-Sunday- 9am-5pm   [place] - 5pm-9pm Monday-Friday Saturday-Sunday- 9am-5pm  [phone] - [place]  [phone] - [place]       What options do I have for a visit other than an office visit? We offer electronic visits (e-visits) and telephone visits, when medically appropriate.  Please check with your medical insurance to see if these types of visits are covered, as you will be responsible for any charges that are not paid by your insurance.      You can use BiocroÃƒÂ­ (secure electronic communication) to access to your chart, send your primary care provider a message, or make an appointment. Ask a team member how to get started.     For a price quote for your services, please call our Consumer Price Line at [phone] or our Imaging Cost estimation line at [phone] (for imaging tests).    [place]            Follow-ups after your visit        Additional Services     PODIATRY/FOOT & ANKLE SURGERY REFERRAL       Your provider has referred you to: [facility]: [facility] [phone]    [email or web address]    Please be aware that coverage of these services is subject to the terms and limitations of your health insurance plan.  Call member services at your health plan with any benefit or coverage questions.      Please bring the following to your appointment:  >>   Any x-rays, CTs or MRIs which have been performed.  Contact the facility where they were done to arrange for  prior to your scheduled appointment.    >>   List of current medications   >>   This referral request   >>   Any documents/labs given to you for this referral                  Follow-up notes from your care team     Return if symptoms worsen or fail to improve, for physical.      Who to contact     If you have questions or need follow up information about today's clinic visit or your schedule please contact [facility] directly at [phone].  Normal or non-critical lab and imaging results will be communicated to you by Girltankhart, letter or phone within 4 business days after the clinic has received the results. If you do not hear from us within 7 days, please contact the clinic through Girltankhart or phone. If you have a critical or abnormal lab result, we will notify you by phone as soon as possible.  Submit refill requests through [email or web address] or call your pharmacy and they will forward the refill request to us. Please allow 3 business days for your refill to be completed.          Additional Information About Your Visit        [email or web address].ru Information     [email or web address] lets you send messages to your doctor, view your test results, renew your prescriptions, schedule appointments and more. To sign up, go to [email or web address] . Click on \"Log in\" on the left side of the screen, which will take you to the Welcome page. Then click on \"Sign up Now\" on the right side of the page.     You will be asked to enter the access code listed below, as well as some personal information. Please follow the directions to " "create your username and password.     Your access code is: [email or web address]  Expires: 2018  2:48 PM     Your access code will  in 90 days. If you need help or a new code, please call your [place] clinic or [phone].        Care EveryWhere ID     This is your Care EveryWhere ID. This could be used by other organizations to access your [place] medical records  [id number]        Your Vitals Were     Pulse Temperature Respirations Height Last Period Pulse Oximetry    76 97.3  F (36.3  C) (Oral) 20 4' 11\" (1.499 m) 2018 96%    Breastfeeding? BMI (Body Mass Index)                No 31.1 kg/m2           Blood Pressure from Last 3 Encounters:   18 98/60   18 90/62   18 115/75    Weight from Last 3 Encounters:   18 154 lb (69.9 kg) (63 %)*   18 153 lb 8 oz (69.6 kg) (63 %)*   18 149 lb 5 oz (67.7 kg) (59 %)*     * Growth percentiles are based on Down Syndrome (2-20 Years) data.              We Performed the Following     PODIATRY/FOOT & ANKLE SURGERY REFERRAL        Primary Care Provider Office Phone # Fax #    Paloma Diaz [phone] [address] [facility] [address]        Equal Access to Services     [facility] AH: Hadii aad ku hadasho Soomaali, waaxda luqadaha, qaybta kaalmada adeegyada, cliff orellana . So [facility] [phone].    ATENCIÓN: Si habla español, tiene a wadsworth disposición servicios gratuitos de asistencia lingüística. Llame al [phone].    We comply with applicable federal civil rights laws and [place] laws. We do not discriminate on the basis of race, color, national origin, age, disability, sex, sexual orientation, or gender identity.            Thank you!     Thank you for choosing [facility]  for your care. Our goal is always to provide you with excellent care. Hearing back from our patients is one way we can continue to improve our services. Please take a few minutes to " complete the written survey that you may receive in the mail after your visit with us. Thank you!             Your Updated Medication List - Protect others around you: Learn how to safely use, store and throw away your medicines at www.disposemymeds.org.          This list is accurate as of 5/1/18  2:45 PM.  Always use your most recent med list.                   Brand Name Dispense Instructions for use Diagnosis    desvenlafaxine succinate 25 MG 24 hr tablet    PRISTIQ    90 tablet    Take 1 tablet (25 mg) by mouth daily    Generalized anxiety disorder       FOLIC ACID PO      Take 1 mg by mouth daily        LAC-HYDRIN 12 % cream   Generic drug:  ammonium lactate      Apply topically 2 times daily as needed for dry skin        loratadine 10 MG tablet    CLARITIN     Take 10 mg by mouth daily        melatonin 3 MG tablet      None Entered        mupirocin 2 % ointment    BACTROBAN    30 g    Apply topically 3 times daily as needed    H/O furuncle        MG Caps capsule   Generic drug:  acetylcysteine      Take 500 mg by mouth daily        naproxen 500 MG tablet    NAPROSYN    60 tablet    Take 1 tablet (500 mg) by mouth 2 times daily as needed    Dysmenorrhea       OMEGA-3 FISH OIL PO      Take 1 g by mouth daily        traZODone 50 MG tablet    DESYREL    45 tablet    Take 0.5 tablets (25 mg) by mouth At Bedtime    Generalized anxiety disorder       triamcinolone 0.5 % cream    KENALOG    30 g    Apply  topically 2 times daily. to affected area as directed.    Skin rash       Vitamin D (Cholecalciferol) 1000 units Tabs

## 2018-05-01 NOTE — NURSING NOTE
"Chief Complaint   Patient presents with     Follow Up     Callus on Right Heel      Health Maintenance     TSH       Initial BP 98/60  Pulse 76  Temp 97.3  F (36.3  C) (Oral)  Resp 20  Ht 4' 11\" (1.499 m)  Wt 154 lb (69.9 kg)  LMP 04/30/2018  SpO2 96%  Breastfeeding? No  BMI 31.1 kg/m2 Estimated body mass index is 31.1 kg/(m^2) as calculated from the following:    Height as of this encounter: 4' 11\" (1.499 m).    Weight as of this encounter: 154 lb (69.9 kg).  Medication Reconciliation: complete   Desirae AVILA MA      "

## 2018-05-08 ENCOUNTER — OFFICE VISIT (OUTPATIENT)
Dept: PODIATRY | Facility: CLINIC | Age: 18
End: 2018-05-08
Payer: COMMERCIAL

## 2018-05-08 VITALS
WEIGHT: 154 LBS | BODY MASS INDEX: 31.1 KG/M2 | DIASTOLIC BLOOD PRESSURE: 60 MMHG | HEART RATE: 76 BPM | SYSTOLIC BLOOD PRESSURE: 112 MMHG

## 2018-05-08 DIAGNOSIS — R22.41 MASS OF FOOT, RIGHT: Primary | ICD-10-CM

## 2018-05-08 PROCEDURE — 99243 OFF/OP CNSLTJ NEW/EST LOW 30: CPT | Performed by: PODIATRIST

## 2018-05-08 NOTE — MR AVS SNAPSHOT
After Visit Summary   5/8/2018    Cher Ibarra    MRN: [id number]           Patient Information     Date Of Birth          2000        Visit Information        Provider Department      5/8/2018 2:45 PM Antolin Mojica DPM [facility]        Care Instructions    We wish you continued good healing. If you have any questions or concerns, please do not hesitate to contact us at [phone]    Please remember to call and schedule a follow up appointment if one was recommended at your earliest convenience.   PODIATRY CLINIC HOURS  TELEPHONE NUMBER    Dr. Antolin QUINTANAPTANIA FAC FAS    Clinics:  [facility]    Chiquita Villar [facility]   Tuesday 1PM-6PM  [place]  Wednesday 7AM-2PM  [place]  Thursday 10AM-6PM  [place]  Friday 7AM-3PM  [place]  Specialty schedulers:   [phone] to make an appointment with any Specialty Provider.        Urgent Care locations:    [facility] Monday-Friday 5 pm - 9 pm. Saturday-Sunday 9 am -5pm    Monday-Friday 11 am - 9 pm Saturday 9 am - 5 pm     Monday-Sunday 12 noon-8PM [phone]     If you need a medication refill, please contact us you may need lab work and/or a follow up visit prior to your refill (i.e. Antifungal medications).    MyChart (secure e-mail communication and access to your chart) to send a message or to make an appointment.    If MRI needed please call [facility] at [phone]        Weight management plan: Patient was referred to their PCP to discuss a diet and exercise plan.     Patient to follow up with Primary Care provider regarding elevated blood pressure.              Follow-ups after your visit        Your next 10 appointments already scheduled     May 08, 2018  2:45 PM CDT   New Visit with Antolin [facility]ica DPM   [facility] [address]  "[facility]  [place] [address]   [phone]              Who to contact     If you have questions or need follow up information about today's clinic visit or your schedule please contact [facility] directly at [phone].  Normal or non-critical lab and imaging results will be communicated to you by MyChart, letter or phone within 4 business days after the clinic has received the results. If you do not hear from us within 7 days, please contact the clinic through MyChart or phone. If you have a critical or abnormal lab result, we will notify you by phone as soon as possible.  Submit refill requests through Mountvacation or call your pharmacy and they will forward the refill request to us. Please allow 3 business days for your refill to be completed.          Additional Information About Your Visit        MyChart Information     Mountvacation lets you send messages to your doctor, view your test results, renew your prescriptions, schedule appointments and more. To sign up, go to [email or web address] . Click on \"Log in\" on the left side of the screen, which will take you to the Welcome page. Then click on \"Sign up Now\" on the right side of the page.     You will be asked to enter the access code listed below, as well as some personal information. Please follow the directions to create your username and password.     Your access code is: [email or web address]  Expires: 2018  2:48 PM     Your access code will  in 90 days. If you need help or a new code, please call your [facility] or [phone].        Care EveryWhere ID     This is your Care EveryWhere ID. This could be used by other organizations to access your [place] medical records  [id number]        Your Vitals Were     Pulse Last Period BMI (Body Mass Index)             76 2018 31.1 kg/m2          Blood Pressure from Last 3 Encounters:   18 112/60   18 98/60   18 90/62    Weight from Last 3 Encounters: "   05/08/18 154 lb (69.9 kg) (63 %)*   05/01/18 154 lb (69.9 kg) (63 %)*   03/05/18 153 lb 8 oz (69.6 kg) (63 %)*     * Growth percentiles are based on Down Syndrome (2-20 Years) data.              Today, you had the following     No orders found for display       Primary Care Provider Office Phone # Fax #    Paloma Diaz [phone]       [address] [facility] [address]        Equal Access to Services     [facility]: Hadii aad ku hadasho Soomaali, waaxda luqadaha, qaybta kaalmada adeegyada, cliff orellana . So [facility] [phone].    ATENCIÓN: Si habla español, tiene a wadsworth disposición servicios gratuitos de asistencia lingüística. [facility] [phone].    We comply with applicable federal civil rights laws and [place] laws. We do not discriminate on the basis of race, color, national origin, age, disability, sex, sexual orientation, or gender identity.            Thank you!     Thank you for choosing [facility]  for your care. Our goal is always to provide you with excellent care. Hearing back from our patients is one way we can continue to improve our services. Please take a few minutes to complete the written survey that you may receive in the mail after your visit with us. Thank you!             Your Updated Medication List - Protect others around you: Learn how to safely use, store and throw away your medicines at www.disposemymeds.org.          This list is accurate as of 5/8/18  2:24 PM.  Always use your most recent med list.                   Brand Name Dispense Instructions for use Diagnosis    desvenlafaxine succinate 25 MG 24 hr tablet    PRISTIQ    90 tablet    Take 1 tablet (25 mg) by mouth daily    Generalized anxiety disorder       FOLIC ACID PO      Take 1 mg by mouth daily        LAC-HYDRIN 12 % cream   Generic drug:  ammonium lactate      Apply topically 2 times daily as needed for dry skin        loratadine 10 MG tablet     CLARITIN     Take 10 mg by mouth daily        melatonin 3 MG tablet      None Entered        mupirocin 2 % ointment    BACTROBAN    30 g    Apply topically 3 times daily as needed    H/O furuncle        MG Caps capsule   Generic drug:  acetylcysteine      Take 500 mg by mouth daily        naproxen 500 MG tablet    NAPROSYN    60 tablet    Take 1 tablet (500 mg) by mouth 2 times daily as needed    Dysmenorrhea       OMEGA-3 FISH OIL PO      Take 1 g by mouth daily        traZODone 50 MG tablet    DESYREL    45 tablet    Take 0.5 tablets (25 mg) by mouth At Bedtime    Generalized anxiety disorder       triamcinolone 0.5 % cream    KENALOG    30 g    Apply  topically 2 times daily. to affected area as directed.    Skin rash       Vitamin D (Cholecalciferol) 1000 units Tabs

## 2018-05-08 NOTE — PROGRESS NOTES
S:  Patient seen in consult form Dr. Diaz and complains of mass on the right heel posterior plantar lateral.  Patient has had this for 1 years.  They are unsure if this is getting larger.  Once it looked somewhat purplish.  Is almost never painful.    Denies mass on contralateral foot.  Denies blister drainage erythema ecchymosis or numbness.  No history of trauma.  patient has Down's syndrome.          ROS:  A 10-point review of systems was performed and is positive for that noted in the HPI and as seen above.  All other areas are negative.          Allergies   Allergen Reactions     Zithromax [Azithromycin Dihydrate]        Current Outpatient Prescriptions   Medication Sig Dispense Refill     acetylcysteine (NAC) 500 MG CAPS capsule Take 500 mg by mouth daily       ammonium lactate (LAC-HYDRIN) 12 % cream Apply topically 2 times daily as needed for dry skin       desvenlafaxine succinate (PRISTIQ) 25 MG 24 hr tablet Take 1 tablet (25 mg) by mouth daily 90 tablet 3     FOLIC ACID PO Take 1 mg by mouth daily       loratadine (CLARITIN) 10 MG tablet Take 10 mg by mouth daily       MELATONIN 3 MG OR TABS None Entered       mupirocin (BACTROBAN) 2 % ointment Apply topically 3 times daily as needed 30 g 6     naproxen (NAPROSYN) 500 MG tablet Take 1 tablet (500 mg) by mouth 2 times daily as needed 60 tablet 1     Omega-3 Fatty Acids (OMEGA-3 FISH OIL PO) Take 1 g by mouth daily       traZODone (DESYREL) 50 MG tablet Take 0.5 tablets (25 mg) by mouth At Bedtime 45 tablet 3     triamcinolone (KENALOG) 0.5 % cream Apply  topically 2 times daily. to affected area as directed. 30 g 0     Vitamin D, Cholecalciferol, 1000 UNITS TABS          Patient Active Problem List   Diagnosis     Down's syndrome     Sleep disturbance     Chronic rhinitis     Dysmenorrhea     Anxiety disorder     Recurrent boils     Obesity       Past Medical History:   Diagnosis Date     Anxiety      ASD (atrial septal defect) 1998    Repair of  ASD/VSD     Chronic rhinitis      Down syndrome      Keratosis pilaris      Obesity 3/5/2018     Recurrent boils        Past Surgical History:   Procedure Laterality Date     REPAIR ATRIAL SEPTAL DEFECT      age 1     REPAIR VENTRICULAR SEPTAL DEFECT      age 1     TONSILLECTOMY & ADENOIDECTOMY      AGE 3       Family History   Problem Relation Age of Onset     CEREBROVASCULAR DISEASE Mother      Depression Mother      Anxiety Disorder Mother      Obesity Mother      Anesthesia Reaction Maternal Grandmother      C.A.D. No family hx of      DIABETES No family hx of      Hypertension No family hx of      Breast Cancer No family hx of      Prostate Cancer No family hx of      Asthma No family hx of        Social History   Substance Use Topics     Smoking status: Never Smoker     Smokeless tobacco: Never Used     Alcohol use No         Exam:    Vitals: /60 (BP Location: Left arm, Patient Position: Sitting, Cuff Size: Adult Regular)  Pulse 76  Wt 154 lb (69.9 kg)  LMP 04/30/2018  BMI 31.1 kg/m2  BMI: Body mass index is 31.1 kg/(m^2).  Height: Data Unavailable    Constitutional/ general:  Pt is in no apparent distress, appears well-nourished.  Cooperative with history and physical exam.  Seen with mother.       Psych:  The patient answered questions appropriately.  Normal affect.  Seems to have reasonable expectations, in terms of treatment.     Eyes:  Visual scanning/ tracking without deficit.     Ears:  Response to auditory stimuli is normal.  Negative hearing aid devices.  Auricles in proper alignment.     Lymphatic:  Popliteal lymph nodes not enlarged.     Lungs:  Non labored breathing, non labored speech. No cough.  No audible wheezing. Even, quiet breathing.       Vascular:  Positive pedal pulses bilaterally for both the DP and PT arteries.  CFT < 3 sec.  positive ankle edema.  Positive pedal hair growth.    Neuro:  Alert and oriented x 3. Coordinated gait.  Light touch sensation is intact to the L4, L5,  S1 distributions. No obvious deficits.  No evidence of neurological-based weakness, spasticity, or contracture in the lower extremities.      Derm: Normal texture and turgor.  No erythema, ecchymosis, or cyanosis.      Musculoskeletal:    Lower extremity muscle strength is normal.  Patient is ambulatory without an assistive device or brace.  No gross deformities.  Pronated arch.    Normal ROM all forefoot and rearfoot joints.  There is a mass on the right heel plantar posterior lateral that measures 40 x 30 x 7 mm.  It is well encapsulated.   Negative pain with palpation.  Nonpulsitile.  No pain with ROM or stressing any tendons.     Radiographic Exam:  X-Ray Findings:  I personally reviewed the films.  Normal      A:  Mass right heel    P:  X-rays right foot personally reviewed.  Discussed possible causes of this mass with patient.  Adjust further evaluation with MRI but mother would rather defer this for now since MRI for this patient is very involved.  For some reason they change their minds they will call me and I will order an MRI.  Will watch this for now and wear shoes which don't press on this.   Patient will to the clinic in 4 months to measure mass again to ensure this has not changed.  Thank you for allowing me participate in the care of this patient.      Antolin Mojica DPM, FACFAS

## 2018-05-08 NOTE — PATIENT INSTRUCTIONS
We wish you continued good healing. If you have any questions or concerns, please do not hesitate to contact us at [phone]    Please remember to call and schedule a follow up appointment if one was recommended at your earliest convenience.   PODIATRY CLINIC HOURS  TELEPHONE NUMBER    Dr. Antolin Mojica D.P.[facility]    Clinics:  [facility]    [facility]iquita Villar [facility]   Tuesday 1PM-6PM  [place]  Wednesday 7AM-2PM  [facility]  Thursday 10AM-6PM  [place]  Friday 7AM-3PM  [place]  Specialty schedulers:   [phone] to make an appointment with any Specialty Provider.        Urgent Care locations:    [facility] Monday-Friday 5 pm - 9 pm. Saturday-Sunday 9 am -5pm    Monday-Friday 11 am - 9 pm Saturday 9 am - 5 pm     Monday-Sunday 12 noon-8PM [phone]     If you need a medication refill, please contact us you may need lab work and/or a follow up visit prior to your refill (i.e. Antifungal medications).    LegalSherpat (secure e-mail communication and access to your chart) to send a message or to make an appointment.    If MRI needed please call [facility] at [phone]        Weight management plan: Patient was referred to their PCP to discuss a diet and exercise plan.     Patient to follow up with Primary Care provider regarding elevated blood pressure.

## 2018-05-08 NOTE — LETTER
5/8/2018         RE: Cher Ibarra  [address] [address]        Dear Colleague,    Thank you for referring your patient, Cher Ibarra, to the [facility]. Please see a copy of my visit note below.    S:  Patient seen in consult form Dr. Diaz and complains of mass on the right heel posterior plantar lateral.  Patient has had this for 1 years.  They are unsure if this is getting larger.  Once it looked somewhat purplish.  Is almost never painful.    Denies mass on contralateral foot.  Denies blister drainage erythema ecchymosis or numbness.  No history of trauma.  patient has Down's syndrome.          ROS:  A 10-point review of systems was performed and is positive for that noted in the HPI and as seen above.  All other areas are negative.          Allergies   Allergen Reactions     Zithromax [Azithromycin Dihydrate]        Current Outpatient Prescriptions   Medication Sig Dispense Refill     acetylcysteine (NAC) 500 MG CAPS capsule Take 500 mg by mouth daily       ammonium lactate (LAC-HYDRIN) 12 % cream Apply topically 2 times daily as needed for dry skin       desvenlafaxine succinate (PRISTIQ) 25 MG 24 hr tablet Take 1 tablet (25 mg) by mouth daily 90 tablet 3     FOLIC ACID PO Take 1 mg by mouth daily       loratadine (CLARITIN) 10 MG tablet Take 10 mg by mouth daily       MELATONIN 3 MG OR TABS None Entered       mupirocin (BACTROBAN) 2 % ointment Apply topically 3 times daily as needed 30 g 6     naproxen (NAPROSYN) 500 MG tablet Take 1 tablet (500 mg) by mouth 2 times daily as needed 60 tablet 1     Omega-3 Fatty Acids (OMEGA-3 FISH OIL PO) Take 1 g by mouth daily       traZODone (DESYREL) 50 MG tablet Take 0.5 tablets (25 mg) by mouth At Bedtime 45 tablet 3     triamcinolone (KENALOG) 0.5 % cream Apply  topically 2 times daily. to affected area as directed. 30 g 0     Vitamin D, Cholecalciferol, 1000 UNITS TABS          Patient Active Problem List    Diagnosis     Down's syndrome     Sleep disturbance     Chronic rhinitis     Dysmenorrhea     Anxiety disorder     Recurrent boils     Obesity       Past Medical History:   Diagnosis Date     Anxiety      ASD (atrial septal defect) 1998    Repair of ASD/VSD     Chronic rhinitis      Down syndrome      Keratosis pilaris      Obesity 3/5/2018     Recurrent boils        Past Surgical History:   Procedure Laterality Date     REPAIR ATRIAL SEPTAL DEFECT      age 1     REPAIR VENTRICULAR SEPTAL DEFECT      age 1     TONSILLECTOMY & ADENOIDECTOMY      AGE 3       Family History   Problem Relation Age of Onset     CEREBROVASCULAR DISEASE Mother      Depression Mother      Anxiety Disorder Mother      Obesity Mother      Anesthesia Reaction Maternal Grandmother      C.A.D. No family hx of      DIABETES No family hx of      Hypertension No family hx of      Breast Cancer No family hx of      Prostate Cancer No family hx of      Asthma No family hx of        Social History   Substance Use Topics     Smoking status: Never Smoker     Smokeless tobacco: Never Used     Alcohol use No         Exam:    Vitals: /60 (BP Location: Left arm, Patient Position: Sitting, Cuff Size: Adult Regular)  Pulse 76  Wt 154 lb (69.9 kg)  LMP 04/30/2018  BMI 31.1 kg/m2  BMI: Body mass index is 31.1 kg/(m^2).  Height: Data Unavailable    Constitutional/ general:  Pt is in no apparent distress, appears well-nourished.  Cooperative with history and physical exam.  Seen with mother.       Psych:  The patient answered questions appropriately.  Normal affect.  Seems to have reasonable expectations, in terms of treatment.     Eyes:  Visual scanning/ tracking without deficit.     Ears:  Response to auditory stimuli is normal.  Negative hearing aid devices.  Auricles in proper alignment.     Lymphatic:  Popliteal lymph nodes not enlarged.     Lungs:  Non labored breathing, non labored speech. No cough.  No audible wheezing. Even, quiet  breathing.       Vascular:  Positive pedal pulses bilaterally for both the DP and PT arteries.  CFT < 3 sec.  positive ankle edema.  Positive pedal hair growth.    Neuro:  Alert and oriented x 3. Coordinated gait.  Light touch sensation is intact to the L4, L5, S1 distributions. No obvious deficits.  No evidence of neurological-based weakness, spasticity, or contracture in the lower extremities.      Derm: Normal texture and turgor.  No erythema, ecchymosis, or cyanosis.      Musculoskeletal:    Lower extremity muscle strength is normal.  Patient is ambulatory without an assistive device or brace.  No gross deformities.  Pronated arch.    Normal ROM all forefoot and rearfoot joints.  There is a mass on the right heel plantar posterior lateral that measures 40 x 30 x 7 mm.  It is well encapsulated.   Negative pain with palpation.  Nonpulsitile.  No pain with ROM or stressing any tendons.     Radiographic Exam:  X-Ray Findings:  I personally reviewed the films.  Normal      A:  Mass right heel    P:  X-rays right foot personally reviewed.  Discussed possible causes of this mass with patient.  Adjust further evaluation with MRI but mother would rather defer this for now since MRI for this patient is very involved.  For some reason they change their minds they will call me and I will order an MRI.  Will watch this for now and wear shoes which don't press on this.   Patient will to the clinic in 4 months to measure mass again to ensure this has not changed.  Thank you for allowing me participate in the care of this patient.      Antolin Mojica DPM, FACFAS      Again, thank you for allowing me to participate in the care of your patient.        Sincerely,        Antolin Mojica DPM

## 2018-05-17 PROCEDURE — 87070 CULTURE OTHR SPECIMN AEROBIC: CPT | Performed by: NURSE PRACTITIONER

## 2018-05-18 ENCOUNTER — OFFICE VISIT (OUTPATIENT)
Dept: FAMILY MEDICINE | Facility: CLINIC | Age: 18
End: 2018-05-18
Payer: COMMERCIAL

## 2018-05-18 VITALS
BODY MASS INDEX: 30.98 KG/M2 | TEMPERATURE: 97.6 F | HEART RATE: 76 BPM | WEIGHT: 153.4 LBS | SYSTOLIC BLOOD PRESSURE: 113 MMHG | RESPIRATION RATE: 16 BRPM | DIASTOLIC BLOOD PRESSURE: 60 MMHG | OXYGEN SATURATION: 96 %

## 2018-05-18 DIAGNOSIS — L02.92 RECURRENT BOILS: ICD-10-CM

## 2018-05-18 DIAGNOSIS — S20.211A CONTUSION OF RIGHT CHEST WALL, INITIAL ENCOUNTER: Primary | ICD-10-CM

## 2018-05-18 PROCEDURE — 99213 OFFICE O/P EST LOW 20 MIN: CPT | Performed by: NURSE PRACTITIONER

## 2018-05-18 NOTE — MR AVS SNAPSHOT
After Visit Summary   5/18/2018    Cher Ibarra    MRN: [id number]           Patient Information     Date Of Birth          2000        Visit Information        Provider Department      5/18/2018 2:00 PM Margaret Covarrubias APRN CNP [facility]        Today's Diagnoses     Contusion of right chest wall, initial encounter    -  1    Recurrent boils          Care Instructions    [facility]    If you have any questions regarding to your visit please contact your care team:       Team Red:   Clinic Hours Telephone Number   Dr. Paloma Mendenhall  (pediatrics)  Margaret Covarrubias NP 7am-7pm  Monday - Thursday   7am-5pm  Fridays  [phone] (fax)    Daphne MURRY  [phone]   Urgent Care - [place] and [place] Monday-Friday  [place] - 11am-8pm  Saturday-Sunday  Both sites - 9am-5pm  [phone] - [facility]  [phone] - [place]       What options do I have for visits at the clinic other than the traditional office visit?  To expand how we care for you, many of our providers are utilizing electronic visits (e-visits) and telephone visits, when medically appropriate, for interactions with their patients rather than a visit in the clinic.   We also offer nurse visits for many medical concerns. Just like any other service, we will bill your insurance company for this type of visit based on time spent on the phone with your provider. Not all insurance companies cover these visits. Please check with your medical insurance if this type of visit is covered. You will be responsible for any charges that are not paid by your insurance.      E-visits via [employer]:  generally incur a $35.00 fee.  Telephone visits:  Time spent on the phone: *charged based on time that is spent on the phone in increments of 10 minutes. Estimated cost:   5-10 mins $30.00   11-20 mins. $59.00   21-30 mins. $85.00     As always, Thank you for  "trusting us with your health care needs!            Discharge MA Tuyet Mathewszier  [facility]            Follow-ups after your visit        Who to contact     If you have questions or need follow up information about today's clinic visit or your schedule please contact [facility] directly at [phone].  Normal or non-critical lab and imaging results will be communicated to you by MyChart, letter or phone within 4 business days after the clinic has received the results. If you do not hear from us within 7 days, please contact the clinic through MyChart or phone. If you have a critical or abnormal lab result, we will notify you by phone as soon as possible.  Submit refill requests through [employer] or call your pharmacy and they will forward the refill request to us. Please allow 3 business days for your refill to be completed.          Additional Information About Your Visit        MyChart Information     [employer] lets you send messages to your doctor, view your test results, renew your prescriptions, schedule appointments and more. To sign up, go to [email or web address] . Click on \"Log in\" on the left side of the screen, which will take you to the Welcome page. Then click on \"Sign up Now\" on the right side of the page.     You will be asked to enter the access code listed below, as well as some personal information. Please follow the directions to create your username and password.     Your access code is: [email or web address]  Expires: 2018  2:48 PM     Your access code will  in 90 days. If you need help or a new code, please call your [place] clinic or [phone].        Care EveryWhere ID     This is your Care EveryWhere ID. This could be used by other organizations to access your [facility] medical records  [id number]        Your Vitals Were     Pulse Temperature Respirations Last Period Pulse Oximetry BMI (Body Mass Index)    76 97.6  F (36.4  C) (Oral) 16 2018 96% 30.98 kg/m2       Blood " Pressure from Last 3 Encounters:   05/18/18 113/60   05/08/18 112/60   05/01/18 98/60    Weight from Last 3 Encounters:   05/18/18 153 lb 6.4 oz (69.6 kg)   05/08/18 154 lb (69.9 kg) (63 %)*   05/01/18 154 lb (69.9 kg) (63 %)*     * Growth percentiles are based on Down Syndrome (2-20 Years) data.              We Performed the Following     Wound Culture Aerobic Bacterial        Primary Care Provider Office Phone # Fax #    Paloma Diaz [phone] [address] [facility] [address]        Equal Access to Services     SUNDAY GARRIDO : Franco Sanchez, hemal schwartz, deangelo gandhi, cliff orellana . So [facility] [phone].    ATENCIÓN: Si habla español, tiene a wadsworth disposición servicios gratuitos de asistencia lingüística. Llame al [phone].    We comply with applicable federal civil rights laws and [place] laws. We do not discriminate on the basis of race, color, national origin, age, disability, sex, sexual orientation, or gender identity.            Thank you!     Thank you for choosing [facility]  for your care. Our goal is always to provide you with excellent care. Hearing back from our patients is one way we can continue to improve our services. Please take a few minutes to complete the written survey that you may receive in the mail after your visit with us. Thank you!             Your Updated Medication List - Protect others around you: Learn how to safely use, store and throw away your medicines at www.disposemymeds.org.          This list is accurate as of 5/18/18  2:21 PM.  Always use your most recent med list.                   Brand Name Dispense Instructions for use Diagnosis    desvenlafaxine succinate 25 MG 24 hr tablet    PRISTIQ    90 tablet    Take 1 tablet (25 mg) by mouth daily    Generalized anxiety disorder       FOLIC ACID PO      Take 1 mg by mouth daily        LAC-HYDRIN 12 % cream   Generic  drug:  ammonium lactate      Apply topically 2 times daily as needed for dry skin        loratadine 10 MG tablet    CLARITIN     Take 10 mg by mouth daily        melatonin 3 MG tablet      None Entered        mupirocin 2 % ointment    BACTROBAN    30 g    Apply topically 3 times daily as needed    H/O furuncle        MG Caps capsule   Generic drug:  acetylcysteine      Take 500 mg by mouth daily        naproxen 500 MG tablet    NAPROSYN    60 tablet    Take 1 tablet (500 mg) by mouth 2 times daily as needed    Dysmenorrhea       OMEGA-3 FISH OIL PO      Take 1 g by mouth daily        traZODone 50 MG tablet    DESYREL    45 tablet    Take 0.5 tablets (25 mg) by mouth At Bedtime    Generalized anxiety disorder       triamcinolone 0.5 % cream    KENALOG    30 g    Apply  topically 2 times daily. to affected area as directed.    Skin rash       Vitamin D (Cholecalciferol) 1000 units Tabs

## 2018-05-18 NOTE — PROGRESS NOTES
SUBJECTIVE:   Cher Ibarra is a 18 year old female who presents to clinic today for the following health issues:    Chief Complaint   Patient presents with     Breast Pain     from fall     Lab Only     specimen from boil that is needed       Musculoskeletal problem/pain      Duration: 4 days    Description  Location: Under right breast    Intensity:  moderate    Accompanying signs and symptoms: none    History  Previous similar problem: no   Previous evaluation:  none    Precipitating or alleviating factors:  Trauma or overuse: YES- Fall  Aggravating factors include: with movement    Therapies tried and outcome: ice and NSAID - Advil    Fell onto the edge of the stage at dance practice on Monday, landing on right chest and left upper arm. Continues to complain of right chest pain since then. Pain is in area of healing incision from breast reduction done about 6 months ago.    Also Mom notes a history of recurrent perineal boils for which [facility]. Mom had been given supplies to collect a wound culture sample at home- she was able to obtain this at 8pm last night and brought the culture with her. Requests culture to be done and results sent to Infectious Disease. They are managing the boils with topical antibiotic prescribed by ID.    Problem list and histories reviewed & adjusted, as indicated.  Additional history: as documented    Patient Active Problem List   Diagnosis     Down's syndrome     Sleep disturbance     Chronic rhinitis     Dysmenorrhea     Anxiety disorder     Recurrent boils     Obesity     Past Surgical History:   Procedure Laterality Date     REPAIR ATRIAL SEPTAL DEFECT      age 1     REPAIR VENTRICULAR SEPTAL DEFECT      age 1     TONSILLECTOMY & ADENOIDECTOMY      AGE 3       Social History   Substance Use Topics     Smoking status: Never Smoker     Smokeless tobacco: Never Used     Alcohol use No     Family History   Problem Relation Age of Onset      CEREBROVASCULAR DISEASE Mother      Depression Mother      Anxiety Disorder Mother      Obesity Mother      Anesthesia Reaction Maternal Grandmother      C.A.D. No family hx of      DIABETES No family hx of      Hypertension No family hx of      Breast Cancer No family hx of      Prostate Cancer No family hx of      Asthma No family hx of            Reviewed and updated as needed this visit by clinical staff       Reviewed and updated as needed this visit by Provider         ROS:  Constitutional, breast, MS, skin, neuro systems are negative, except as otherwise noted.    OBJECTIVE:     /60  Pulse 76  Temp 97.6  F (36.4  C) (Oral)  Resp 16  Wt 153 lb 6.4 oz (69.6 kg)  LMP 04/30/2018  SpO2 96%  BMI 30.98 kg/m2  Body mass index is 30.98 kg/(m^2).  GENERAL: healthy, alert and no distress  BREAST: Well healed incisions of bilateral breasts. No masses, tenderness or nipple discharge and no palpable axillary masses or adenopathy  MS: anterior chest wall nontender in area of described pain  SKIN:   NEURO: Normal strength and tone, mentation intact and speech normal    Diagnostic Test Results:  none     ASSESSMENT/PLAN:     1. Contusion of right chest wall, initial encounter  No pain on exam- Mom reassured if there was any fracture the pain would be much greater. Ok to continue PRN Advil if she complains of further pain.    2. Recurrent boils    - Wound Culture Aerobic Bacterial    Follow up as needed    AUBREY Olson CNP  [facility]

## 2018-05-18 NOTE — PATIENT INSTRUCTIONS
[facility]    If you have any questions regarding to your visit please contact your care team:       Team Red:   Clinic Hours Telephone Number   Dr. Paloma Mendenhall  (pediatrics)  Margaret Covarrubias NP 7am-7pm  Monday - Thursday   7am-5pm  Fridays  [phone] (fax)    Daphne MURRY  [phone]   Urgent Care - [place] and [place] Monday-Friday  [place] - 11am-8pm  Saturday-Sunday  Both sites - 9am-5pm  [phone] - [facility]  [phone] - [place]       What options do I have for visits at the clinic other than the traditional office visit?  To expand how we care for you, many of our providers are utilizing electronic visits (e-visits) and telephone visits, when medically appropriate, for interactions with their patients rather than a visit in the clinic.   We also offer nurse visits for many medical concerns. Just like any other service, we will bill your insurance company for this type of visit based on time spent on the phone with your provider. Not all insurance companies cover these visits. Please check with your medical insurance if this type of visit is covered. You will be responsible for any charges that are not paid by your insurance.      E-visits via [employer]:  generally incur a $35.00 fee.  Telephone visits:  Time spent on the phone: *charged based on time that is spent on the phone in increments of 10 minutes. Estimated cost:   5-10 mins $30.00   11-20 mins. $59.00   21-30 mins. $85.00     As always, Thank you for trusting us with your health care needs!            Discharge WASHINGTON Marrufo CMA

## 2018-05-18 NOTE — LETTER
May 21, 2018    Cher Ibarra  [address] [address]      Dear Cher Kwon's wound culture shows normal skin rodger. No specific bacteria could be identified on the culture.     Enclosed is a copy of your results.  Results for orders placed or performed in visit on 05/18/18   Wound Culture Aerobic Bacterial   Result Value Ref Range    Specimen Description Perineal Wound     Culture Micro Light growth  Normal skin rodger      If you have any questions or concerns, please call myself or my nurse at [phone].      Sincerely,        Margaret Covarrubias CNP/dt      CC: Results to infectious disease at [facility] Ari Ballard MD Fax: [phone]

## 2018-05-20 LAB
BACTERIA SPEC CULT: NORMAL
SPECIMEN SOURCE: NORMAL

## 2018-05-21 NOTE — PROGRESS NOTES
Please call the patient's mother with these results:    Cher's wound culture shows normal skin rodger. No specific bacteria could be identified on the culture.    Di, please fax results to infectious disease at [facility].    Margaret Covarrubias CNP

## 2018-05-24 ENCOUNTER — OFFICE VISIT (OUTPATIENT)
Dept: FAMILY MEDICINE | Facility: CLINIC | Age: 18
End: 2018-05-24
Payer: COMMERCIAL

## 2018-05-24 VITALS
BODY MASS INDEX: 30.84 KG/M2 | HEART RATE: 78 BPM | DIASTOLIC BLOOD PRESSURE: 78 MMHG | OXYGEN SATURATION: 97 % | SYSTOLIC BLOOD PRESSURE: 114 MMHG | TEMPERATURE: 98.4 F | HEIGHT: 59 IN | WEIGHT: 153 LBS

## 2018-05-24 DIAGNOSIS — H69.92 DYSFUNCTION OF LEFT EUSTACHIAN TUBE: Primary | ICD-10-CM

## 2018-05-24 PROCEDURE — 99213 OFFICE O/P EST LOW 20 MIN: CPT | Performed by: NURSE PRACTITIONER

## 2018-05-24 RX ORDER — FLUTICASONE PROPIONATE 50 MCG
1-2 SPRAY, SUSPENSION (ML) NASAL DAILY
Qty: 1 BOTTLE | Refills: 3 | Status: SHIPPED | OUTPATIENT
Start: 2018-05-24 | End: 2019-10-01

## 2018-05-24 RX ORDER — CETIRIZINE HYDROCHLORIDE 10 MG/1
10 TABLET ORAL EVERY EVENING
Qty: 90 TABLET | Refills: 1 | Status: SHIPPED | OUTPATIENT
Start: 2018-05-24

## 2018-05-24 NOTE — PATIENT INSTRUCTIONS
[facility]    If you have any questions regarding to your visit please contact your care team:       Team Red:   Clinic Hours Telephone Number   Dr. Paloma Covarrubias, NP   7am-7pm  Monday - Thursday   7am-5pm  Fridays  [phone]  (Appointment scheduling available 24/7)    Questions about your recent visit?   Team Line  [phone]   Urgent Care - [place] and [facility] - 11am-9pm Monday-Friday Saturday-Sunday- 9am-5pm   [place] - 5pm-9pm Monday-Friday Saturday-Sunday- 9am-5pm  [phone] - [place]  [phone] - [place]       What options do I have for a visit other than an office visit? We offer electronic visits (e-visits) and telephone visits, when medically appropriate.  Please check with your medical insurance to see if these types of visits are covered, as you will be responsible for any charges that are not paid by your insurance.      You can use [employer] (secure electronic communication) to access to your chart, send your primary care provider a message, or make an appointment. Ask a team member how to get started.     For a price quote for your services, please call our Consumer Price Line at [phone] or our Imaging Cost estimation line at [phone] (for imaging tests).      Discharged by Fiona Kee MA.

## 2018-05-24 NOTE — PROGRESS NOTES
SUBJECTIVE:   Cher Ibarra is a 18 year old female who presents to clinic today for the following health issues:      Ear Problem      Duration: 2 days    Description (location/character/radiation): left ear pain    Intensity:  moderate    Accompanying signs and symptoms: allergies and sniffels    History (similar episodes/previous evaluation): None    Precipitating or alleviating factors: None    Therapies tried and outcome: Advil which helps     Afebrile.  History obtained from mother as patient poor historia d/t Down Syndrome.    Problem list and histories reviewed & adjusted, as indicated.  Additional history: as documented    Patient Active Problem List   Diagnosis     Down's syndrome     Sleep disturbance     Chronic rhinitis     Dysmenorrhea     Anxiety disorder     Recurrent boils     Obesity     Past Surgical History:   Procedure Laterality Date     MAMMOPLASTY REDUCTION       REPAIR ATRIAL SEPTAL DEFECT      age 1     REPAIR VENTRICULAR SEPTAL DEFECT      age 1     TONSILLECTOMY & ADENOIDECTOMY      AGE 3       Social History   Substance Use Topics     Smoking status: Never Smoker     Smokeless tobacco: Never Used     Alcohol use No     Family History   Problem Relation Age of Onset     CEREBROVASCULAR DISEASE Mother      Depression Mother      Anxiety Disorder Mother      Obesity Mother      Anesthesia Reaction Maternal Grandmother      C.A.D. No family hx of      DIABETES No family hx of      Hypertension No family hx of      Breast Cancer No family hx of      Prostate Cancer No family hx of      Asthma No family hx of            Reviewed and updated as needed this visit by clinical staff       Reviewed and updated as needed this visit by Provider         ROS:  Constitutional, HEENT, cardiovascular, pulmonary, gi systems are negative, except as otherwise noted.    OBJECTIVE:     /78 (BP Location: Left arm, Patient Position: Chair, Cuff Size: Adult Regular)  Pulse 78  Temp 98.4  F (36.9  C)  "(Oral)   4' 11\" (1.499 m)  Wt 153 lb (69.4 kg)  LMP 04/30/2018  SpO2 97%  BMI 30.9 kg/m2  Body mass index is 30.9 kg/(m^2).  GENERAL: healthy, alert and no distress  EYES: Eyes grossly normal to inspection, PERRL and conjunctivae and sclerae normal  HENT: normal cephalic/atraumatic, right ear: normal: no effusions, no erythema, normal landmarks, left ear: TM retraction, nose and mouth without ulcers or lesions, oropharynx clear and oral mucous membranes moist  NECK: no adenopathy, no asymmetry, masses, or scars and thyroid normal to palpation  RESP: lungs clear to auscultation - no rales, rhonchi or wheezes  CV: regular rate and rhythm, normal S1 S2, no S3 or S4, no murmur, click or rub, no peripheral edema and peripheral pulses strong    Diagnostic Test Results:  none     ASSESSMENT/PLAN:       1. Dysfunction of left eustachian tube  Will optimize allergy control changing from Claritin to Zyrtec.   Start Flonase for a period of 2 weeks- may continue longer if desired for seasonal allergies.    - cetirizine (ZYRTEC) 10 MG tablet; Take 1 tablet (10 mg) by mouth every evening  Dispense: 90 tablet; Refill: 1  - fluticasone (FLONASE) 50 MCG/ACT spray; Spray 1-2 sprays into both nostrils daily  Dispense: 1 Bottle; Refill: 3    Follow up if symptoms worsen or fail to improve    AUBREY Olson [facility]    "

## 2018-05-24 NOTE — MR AVS SNAPSHOT
After Visit Summary   5/24/2018    Cher Ibarra    MRN: [id number]           Patient Information     Date Of Birth          2000        Visit Information        Provider Department      5/24/2018 11:20 AM Margaret Covarrubias APRN CNP [facility]        Today's Diagnoses     Dysfunction of left eustachian tube    -  1      Care Instructions    [facility]    If you have any questions regarding to your visit please contact your care team:       Team Red:   Clinic Hours Telephone Number   Dr. Paloma Covarrubias, NP   7am-7pm  Monday - Thursday   7am-5pm  Fridays  [phone]  (Appointment scheduling available 24/7)    Questions about your recent visit?   Team Line  [phone]   Urgent Care - [place] and [facility] - 11am-9pm Monday-Friday Saturday-Sunday- 9am-5pm   [place] - 5pm-9pm Monday-Friday Saturday-Sunday- 9am-5pm  [phone] - [place]  [phone] - [place]       What options do I have for a visit other than an office visit? We offer electronic visits (e-visits) and telephone visits, when medically appropriate.  Please check with your medical insurance to see if these types of visits are covered, as you will be responsible for any charges that are not paid by your insurance.      You can use [employer] (secure electronic communication) to access to your chart, send your primary care provider a message, or make an appointment. Ask a team member how to get started.     For a price quote for your services, please call our Consumer Price Line at [phone] or our Imaging Cost estimation line at [phone] (for imaging tests).      Discharged by Fiona Kee MA.            Follow-ups after your visit        Who to contact     If you have questions or need follow up information about today's clinic visit or your schedule please contact [facility] directly at  "[phone].  Normal or non-critical lab and imaging results will be communicated to you by MyChart, letter or phone within 4 business days after the clinic has received the results. If you do not hear from us within 7 days, please contact the clinic through [employer] or phone. If you have a critical or abnormal lab result, we will notify you by phone as soon as possible.  Submit refill requests through [employer] or call your pharmacy and they will forward the refill request to us. Please allow 3 business days for your refill to be completed.          Additional Information About Your Visit        [employer] Information     [employer] lets you send messages to your doctor, view your test results, renew your prescriptions, schedule appointments and more. To sign up, go to [email or web address] . Click on \"Log in\" on the left side of the screen, which will take you to the Welcome page. Then click on \"Sign up Now\" on the right side of the page.     You will be asked to enter the access code listed below, as well as some personal information. Please follow the directions to create your username and password.     Your access code is: [email or web address]  Expires: 2018 11:00 AM     Your access code will  in 90 days. If you need help or a new code, please call your [facility] clinic or [phone].        Care EveryWhere ID     This is your Care EveryWhere ID. This could be used by other organizations to access your [facility] medical records  [id number]        Your Vitals Were     Pulse Temperature Height Last Period Pulse Oximetry BMI (Body Mass Index)    78 98.4  F (36.9  C) (Oral) 4' 11\" (1.499 m) 2018 97% 30.9 kg/m2       Blood Pressure from Last 3 Encounters:   18 114/78   18 113/60   18 112/60    Weight from Last 3 Encounters:   18 153 lb (69.4 kg)   18 153 lb 6.4 oz (69.6 kg)   18 154 lb (69.9 kg) (63 %)*     * Growth percentiles are based on Down Syndrome (2-20 Years) data.    "           Today, you had the following     No orders found for display         Today's Medication Changes          These changes are accurate as of 5/24/18 11:22 AM.  If you have any questions, ask your nurse or doctor.               Start taking these medicines.        Dose/Directions    cetirizine 10 MG tablet   Commonly known as:  zyrTEC   Used for:  Dysfunction of left eustachian tube   Started by:  Margaret Covarrubias APRN CNP        Dose:  10 mg   Take 1 tablet (10 mg) by mouth every evening   Quantity:  90 tablet   Refills:  1       fluticasone 50 MCG/ACT spray   Commonly known as:  FLONASE   Used for:  Dysfunction of left eustachian tube   Started by:  Margaret Covarrubias APRN CNP        Dose:  1-2 spray   Spray 1-2 sprays into both nostrils daily   Quantity:  1 Bottle   Refills:  3         Stop taking these medicines if you haven't already. Please contact your care team if you have questions.     loratadine 10 MG tablet   Commonly known as:  CLARITIN   Stopped by:  Margaret Covarrubias APRN CNP                Where to get your medicines      These medications were sent to [facility] [id number] [place] [address] [address] [facility] [address]    Hours:  Test fax successful 12/11/02   Phone:  [phone]     cetirizine 10 MG tablet    fluticasone 50 MCG/ACT spray                Primary Care Provider Office Phone # Fax #    Paloma Diaz [phone]       [address] [facility] [address]        Equal Access to Services     [facility]: Hadii aad ku hadasho Soomaali, waaxda luqadaha, qaybta kaalmada adeegyamanda, cliff orellana . So [facility] [phone].    ATENCIÓN: Si habla español, tiene a wadsworth disposición servicios gratuitos de asistencia lingüística. Llame al [phone].    We comply with applicable federal civil rights laws and [place] laws. We do not discriminate on the  basis of race, color, national origin, age, disability, sex, sexual orientation, or gender identity.            Thank you!     Thank you for choosing [facility]  for your care. Our goal is always to provide you with excellent care. Hearing back from our patients is one way we can continue to improve our services. Please take a few minutes to complete the written survey that you may receive in the mail after your visit with us. Thank you!             Your Updated Medication List - Protect others around you: Learn how to safely use, store and throw away your medicines at www.disposemymeds.org.          This list is accurate as of 5/24/18 11:22 AM.  Always use your most recent med list.                   Brand Name Dispense Instructions for use Diagnosis    cetirizine 10 MG tablet    zyrTEC    90 tablet    Take 1 tablet (10 mg) by mouth every evening    Dysfunction of left eustachian tube       desvenlafaxine succinate 25 MG 24 hr tablet    PRISTIQ    90 tablet    Take 1 tablet (25 mg) by mouth daily    Generalized anxiety disorder       fluticasone 50 MCG/ACT spray    FLONASE    1 Bottle    Spray 1-2 sprays into both nostrils daily    Dysfunction of left eustachian tube       FOLIC ACID PO      Take 1 mg by mouth daily        LAC-HYDRIN 12 % cream   Generic drug:  ammonium lactate      Apply topically 2 times daily as needed for dry skin        melatonin 3 MG tablet      None Entered        mupirocin 2 % ointment    BACTROBAN    30 g    Apply topically 3 times daily as needed    H/O furuncle        MG Caps capsule   Generic drug:  acetylcysteine      Take 500 mg by mouth daily        naproxen 500 MG tablet    NAPROSYN    60 tablet    Take 1 tablet (500 mg) by mouth 2 times daily as needed    Dysmenorrhea       OMEGA-3 FISH OIL PO      Take 1 g by mouth daily        traZODone 50 MG tablet    DESYREL    45 tablet    Take 0.5 tablets (25 mg) by mouth At Bedtime    Generalized anxiety disorder        triamcinolone 0.5 % cream    KENALOG    30 g    Apply  topically 2 times daily. to affected area as directed.    Skin rash       Vitamin D (Cholecalciferol) 1000 units Tabs

## 2018-06-22 ENCOUNTER — TRANSFERRED RECORDS (OUTPATIENT)
Dept: HEALTH INFORMATION MANAGEMENT | Facility: CLINIC | Age: 18
End: 2018-06-22

## 2018-08-22 DIAGNOSIS — Z87.2: ICD-10-CM

## 2018-08-22 RX ORDER — MUPIROCIN 20 MG/G
OINTMENT TOPICAL
Qty: 22 G | Refills: 0 | Status: SHIPPED | OUTPATIENT
Start: 2018-08-22 | End: 2019-11-05

## 2018-08-22 NOTE — TELEPHONE ENCOUNTER
mupirocin (BACTROBAN) 2 % ointment      Last Written Prescription Date:  6/15/2017  Last Fill Quantity: 30 g,   # refills: 6  Last Office Visit: 5/24/2018    Future Office visit:       Routing refill request to provider for review/approval because:  Drug not on the [facility], P or [facility] refill protocol or controlled substance

## 2018-08-24 ENCOUNTER — APPOINTMENT (OUTPATIENT)
Dept: OPTOMETRY | Facility: CLINIC | Age: 18
End: 2018-08-24
Payer: COMMERCIAL

## 2018-08-24 ENCOUNTER — OFFICE VISIT (OUTPATIENT)
Dept: OPHTHALMOLOGY | Facility: CLINIC | Age: 18
End: 2018-08-24
Payer: COMMERCIAL

## 2018-08-24 DIAGNOSIS — Q90.9 DOWN'S SYNDROME: Primary | ICD-10-CM

## 2018-08-24 DIAGNOSIS — H52.13 MYOPIA OF BOTH EYES: ICD-10-CM

## 2018-08-24 DIAGNOSIS — H50.012 ESOTROPIA OF LEFT EYE: ICD-10-CM

## 2018-08-24 PROCEDURE — 92015 DETERMINE REFRACTIVE STATE: CPT | Performed by: STUDENT IN AN ORGANIZED HEALTH CARE EDUCATION/TRAINING PROGRAM

## 2018-08-24 PROCEDURE — 92014 COMPRE OPH EXAM EST PT 1/>: CPT | Performed by: STUDENT IN AN ORGANIZED HEALTH CARE EDUCATION/TRAINING PROGRAM

## 2018-08-24 PROCEDURE — 92340 FIT SPECTACLES MONOFOCAL: CPT | Performed by: STUDENT IN AN ORGANIZED HEALTH CARE EDUCATION/TRAINING PROGRAM

## 2018-08-24 ASSESSMENT — CUP TO DISC RATIO
OS_RATIO: 0.1
OD_RATIO: 0.1

## 2018-08-24 ASSESSMENT — REFRACTION_WEARINGRX
SPECS_TYPE: SVL
OS_SPHERE: -8.00
OD_SPHERE: -9.50
OD_AXIS: 045
OS_AXIS: 135
OD_CYLINDER: +1.50
OS_CYLINDER: +1.50

## 2018-08-24 ASSESSMENT — EXTERNAL EXAM - LEFT EYE: OS_EXAM: NORMAL

## 2018-08-24 ASSESSMENT — REFRACTION_MANIFEST
OS_AXIS: 135
OS_CYLINDER: +0.75
OD_SPHERE: -10.50
OD_CYLINDER: +1.75
OS_SPHERE: -8.50
OD_AXIS: 045

## 2018-08-24 ASSESSMENT — REFRACTION
OD_AXIS: 045
OS_SPHERE: -8.50
OS_CYLINDER: +0.75
OD_SPHERE: -10.50
OD_CYLINDER: +1.50
OS_AXIS: 135

## 2018-08-24 ASSESSMENT — SLIT LAMP EXAM - LIDS
COMMENTS: NORMAL
COMMENTS: NORMAL

## 2018-08-24 ASSESSMENT — VISUAL ACUITY
CORRECTION_TYPE: GLASSES
OS_CC: 20/60-1
METHOD: SNELLEN - LINEAR
OD_CC: 20/70

## 2018-08-24 ASSESSMENT — EXTERNAL EXAM - RIGHT EYE: OD_EXAM: NORMAL

## 2018-08-24 ASSESSMENT — TONOMETRY
OS_IOP_MMHG: 19
OD_IOP_MMHG: 19
IOP_METHOD: ICARE

## 2018-08-24 ASSESSMENT — CONF VISUAL FIELD: COMMENTS: UNABLE

## 2018-08-24 NOTE — MR AVS SNAPSHOT
"              After Visit Summary   8/24/2018    Cher Ibarra    MRN: [id number]           Patient Information     Date Of Birth          2000        Visit Information        Provider Department      8/24/2018 2:00 PM Amilcar Sanchez MD [facility]        Today's Diagnoses     Down's syndrome    -  1    High myopia of both eyes        Esotropia of left eye          Care Instructions    Glasses prescription given     Amilcar CLARK. Daniel KAT  [phone]            Follow-ups after your visit        Follow-up notes from your care team     Return in about 1 year (around 8/24/2019) for Complete Exam.      Who to contact     If you have questions or need follow up information about today's clinic visit or your schedule please contact [facility] directly at [phone].  Normal or non-critical lab and imaging results will be communicated to you by MyChart, letter or phone within 4 business days after the clinic has received the results. If you do not hear from us within 7 days, please contact the clinic through MyChart or phone. If you have a critical or abnormal lab result, we will notify you by phone as soon as possible.  Submit refill requests through [employer] or call your pharmacy and they will forward the refill request to us. Please allow 3 business days for your refill to be completed.          Additional Information About Your Visit        MyChart Information     [employer] lets you send messages to your doctor, view your test results, renew your prescriptions, schedule appointments and more. To sign up, go to [email or web address] . Click on \"Log in\" on the left side of the screen, which will take you to the Welcome page. Then click on \"Sign up Now\" on the right side of the page.     You will be asked to enter the access code listed below, as well as some personal information. Please follow the directions to create your username and password.     Your access code is: " [email or web address]  Expires: 2018  1:48 PM     Your access code will  in 90 days. If you need help or a new code, please call your [place] clinic or [phone].        Care EveryWhere ID     This is your Care EveryWhere ID. This could be used by other organizations to access your [place] medical records  [id number]         Blood Pressure from Last 3 Encounters:   18 114/78   18 113/60   18 112/60    Weight from Last 3 Encounters:   18 69.4 kg (153 lb)   18 69.6 kg (153 lb 6.4 oz)   18 69.9 kg (154 lb) (63 %)*     * Growth percentiles are based on Down Syndrome (2-20 Years) data.              We Performed the Following     EYE EXAM (SIMPLE-NONBILLABLE)     REFRACTIVE STATUS        Primary Care Provider Office Phone # Fax #    Paloma Diaz [phone] [address] [address]        Equal Access to Services     [facility]: Hadii aad ku hadasho Soomaali, waaxda luqadaha, qaybta kaalmada adeegyamanda, cliff orellana . So [facility] [phone].    ATENCIÓN: Si habla español, tiene a wadsworth disposición servicios gratuitos de asistencia lingüística. [facility] [phone].    We comply with applicable federal civil rights laws and [place] laws. We do not discriminate on the basis of race, color, national origin, age, disability, sex, sexual orientation, or gender identity.            Thank you!     Thank you for choosing [facility]  for your care. Our goal is always to provide you with excellent care. Hearing back from our patients is one way we can continue to improve our services. Please take a few minutes to complete the written survey that you may receive in the mail after your visit with us. Thank you!             Your Updated Medication List - Protect others around you: Learn how to safely use, store and throw away your medicines at www.disposemymeds.org.          This list is accurate as of  8/24/18  3:03 PM.  Always use your most recent med list.                   Brand Name Dispense Instructions for use Diagnosis    cetirizine 10 MG tablet    zyrTEC    90 tablet    Take 1 tablet (10 mg) by mouth every evening    Dysfunction of left eustachian tube       desvenlafaxine succinate 25 MG 24 hr tablet    PRISTIQ    90 tablet    Take 1 tablet (25 mg) by mouth daily    Generalized anxiety disorder       fluticasone 50 MCG/ACT spray    FLONASE    1 Bottle    Spray 1-2 sprays into both nostrils daily    Dysfunction of left eustachian tube       FOLIC ACID PO      Take 1 mg by mouth daily        LAC-HYDRIN 12 % cream   Generic drug:  ammonium lactate      Apply topically 2 times daily as needed for dry skin        melatonin 3 MG tablet      None Entered        mupirocin 2 % ointment    BACTROBAN    22 g    APPLY EXTERNALLY TO THE AFFECTED AREA THREE TIMES DAILY AS NEEDED    H/O furuncle        MG Caps capsule   Generic drug:  acetylcysteine      Take 500 mg by mouth daily        naproxen 500 MG tablet    NAPROSYN    60 tablet    Take 1 tablet (500 mg) by mouth 2 times daily as needed    Dysmenorrhea       OMEGA-3 FISH OIL PO      Take 1 g by mouth daily        traZODone 50 MG tablet    DESYREL    45 tablet    Take 0.5 tablets (25 mg) by mouth At Bedtime    Generalized anxiety disorder       triamcinolone 0.5 % cream    KENALOG    30 g    Apply  topically 2 times daily. to affected area as directed.    Skin rash       Vitamin D (Cholecalciferol) 1000 units Tabs

## 2018-08-24 NOTE — PROGRESS NOTES
Current Eye Medications:  no     Subjective:  Comprehensive eye exam.   Pt has lost her glasses and needs them replaced. Pt has Downs Syndrome, has larger LET and high myopia both eyes.    No previous eye injuries or surgeries.      Objective:  See Ophthalmology Exam.       Assessment:  Cher Ibarra is a 18 year old female who presents with:   Encounter Diagnoses   Name Primary?     Down's syndrome      Esotropia of left eye      High myopia of both eyes        Plan:  Glasses prescription given     Amilcar Sanchez MD  [phone]

## 2018-08-24 NOTE — LETTER
8/24/2018         RE: Cher Ibarra  [address] [facility] [address]        Dear Colleague,    Thank you for referring your patient, Cher Ibarra, to the [facility].     Her eye exam is stable.  Please see a copy of my visit note below.     Current Eye Medications:  no     Subjective:  Comprehensive eye exam.   Pt has lost her glasses and needs them replaced. Pt has Downs Syn. ,has larger LET and high myopia both eyes   Objective:  See Ophthalmology Exam.       Assessment:      Plan:   See Patient Instructions.         Again, thank you for allowing me to participate in the care of your patient.        Sincerely,        Amilcar Sanchez MD

## 2018-11-12 ENCOUNTER — ALLIED HEALTH/NURSE VISIT (OUTPATIENT)
Dept: NURSING | Facility: CLINIC | Age: 18
End: 2018-11-12
Payer: COMMERCIAL

## 2018-11-12 DIAGNOSIS — Z23 NEED FOR PROPHYLACTIC VACCINATION AND INOCULATION AGAINST INFLUENZA: Primary | ICD-10-CM

## 2018-11-12 PROCEDURE — 99207 ZZC NO CHARGE NURSE ONLY: CPT

## 2018-11-12 PROCEDURE — 90672 LAIV4 VACCINE INTRANASAL: CPT | Mod: SL

## 2018-11-12 PROCEDURE — 90473 IMMUNE ADMIN ORAL/NASAL: CPT

## 2018-11-12 NOTE — NURSING NOTE
Prior to injection verified patient identity using patient's name and date of birth.  Patient instructed to remain in clinic for 15 minutes  afterwards, and to report any adverse reaction to me immediately.    Carmen JIMENEZ CMA ([facility])

## 2018-11-12 NOTE — MR AVS SNAPSHOT
After Visit Summary   11/12/2018    Cher Ibarra    MRN: [id number]           Patient Information     Date Of Birth          2000        Visit Information        Provider Department      11/12/2018 2:40 PM [facility]        Today's Diagnoses     Need for prophylactic vaccination and inoculation against influenza    -  1       Follow-ups after your visit        Who to contact     If you have questions or need follow up information about today's clinic visit or your schedule please contact [facility] directly at [phone].  Normal or non-critical lab and imaging results will be communicated to you by MyChart, letter or phone within 4 business days after the clinic has received the results. If you do not hear from us within 7 days, please contact the clinic through MyChart or phone. If you have a critical or abnormal lab result, we will notify you by phone as soon as possible.  Submit refill requests through [employer] or call your pharmacy and they will forward the refill request to us. Please allow 3 business days for your refill to be completed.          Additional Information About Your Visit        Care EveryWhere ID     This is your Care EveryWhere ID. This could be used by other organizations to access your [place] medical records  [id number]         Blood Pressure from Last 3 Encounters:   05/24/18 114/78   05/18/18 113/60   05/08/18 112/60    Weight from Last 3 Encounters:   05/24/18 153 lb (69.4 kg)   05/18/18 153 lb 6.4 oz (69.6 kg)   05/08/18 154 lb (69.9 kg) (63 %)*     * Growth percentiles are based on Down Syndrome (2-20 Years) data.              We Performed the Following     INTRANASAL FLU VACCINE, QUADRIVALENT LIVE (FluMist) [80250]- 2-49 YRS     Vaccine Administration, Nasal/Oral [67287]        Primary Care Provider Office Phone # Fax #    Paloma Diaz [phone] [address] [place] [address]         Equal Access to Services     [facility] : Hadii aad ku hadtrayjayant Teressaali, waaxda luqadaha, qaybta kaalmacliff restrepo. So [facility] [phone].    ATENCIÓN: Si habla edmundo, tiene a wadsworth disposición servicios gratuitos de asistencia lingüística. [facility] [phone].    We comply with applicable federal civil rights laws and [place] laws. We do not discriminate on the basis of race, color, national origin, age, disability, sex, sexual orientation, or gender identity.            Thank you!     Thank you for choosing [facility] [place]  for your care. Our goal is always to provide you with excellent care. Hearing back from our patients is one way we can continue to improve our services. Please take a few minutes to complete the written survey that you may receive in the mail after your visit with us. Thank you!             Your Updated Medication List - Protect others around you: Learn how to safely use, store and throw away your medicines at www.disposemymeds.org.          This list is accurate as of 11/12/18  2:43 PM.  Always use your most recent med list.                   Brand Name Dispense Instructions for use Diagnosis    cetirizine 10 MG tablet    zyrTEC    90 tablet    Take 1 tablet (10 mg) by mouth every evening    Dysfunction of left eustachian tube       desvenlafaxine succinate 25 MG 24 hr tablet    PRISTIQ    90 tablet    Take 1 tablet (25 mg) by mouth daily    Generalized anxiety disorder       fluticasone 50 MCG/ACT spray    FLONASE    1 Bottle    Spray 1-2 sprays into both nostrils daily    Dysfunction of left eustachian tube       FOLIC ACID PO      Take 1 mg by mouth daily        LAC-HYDRIN 12 % cream   Generic drug:  ammonium lactate      Apply topically 2 times daily as needed for dry skin        melatonin 3 MG tablet      None Entered        mupirocin 2 % ointment    BACTROBAN    22 g    APPLY EXTERNALLY TO THE AFFECTED AREA THREE TIMES DAILY AS  NEEDED    H/O furuncle        MG Caps capsule   Generic drug:  acetylcysteine      Take 500 mg by mouth daily        naproxen 500 MG tablet    NAPROSYN    60 tablet    Take 1 tablet (500 mg) by mouth 2 times daily as needed    Dysmenorrhea       OMEGA-3 FISH OIL PO      Take 1 g by mouth daily        traZODone 50 MG tablet    DESYREL    45 tablet    Take 0.5 tablets (25 mg) by mouth At Bedtime    Generalized anxiety disorder       triamcinolone 0.5 % cream    KENALOG    30 g    Apply  topically 2 times daily. to affected area as directed.    Skin rash       Vitamin D (Cholecalciferol) 1000 units Tabs

## 2018-11-12 NOTE — PROGRESS NOTES
INTRANASAL INFLUENZA IMMUNIZATION DOCUMENTATION    1. Is the person to be vaccinated sick today? No    2. Does the person to be vaccinated have an allergy to a component of the influenza vaccine? No    3. Has the person to be vaccinated ever had a serious reaction to influenza vaccine in the past? No    4. Is the person to be vaccinated younger than age 2  years or older than age 49 years? No    5. Does the person to be vaccinated have a long-term health problem with heart disease, lung disease (including asthma), kidney disease, neurologic disease, liver disease, disease (e.g., diabetes), or anemia or another blood disorder? No    6.  If the person to be vaccinated is a child age 2 through 4 years, in the past 12 months, has a health care provider told you the child had wheezing or asthma? No    7. Does the person to be vaccinated have cancer, leukemia, HIV/AIDS, or any other immune system problem; or, in the past 3 months, have they taken medications that affect the immune system, such as prednisone, other steroids, drugs for the treatment of rheumatoid arthritis, Crohn s disease, or psoriasis or anticancer drugs; or have they had radiation treatments? No    8. Is the person to be vaccinated receiving influenza antiviral medications? No    9. Is the person to be vaccinated a child or teen age 2 through 17 years and receiving aspirin therapy or aspirin-containing therapy? No    10. Is the person to be vaccinated pregnant or could she become pregnant within the next month? No    11. Has the person to be vaccinated ever had Guillain-Barré syndrome? No    12. Does the person to be vaccinated live with or expect to have close contact with a person whose immune system is severely compromised and who must be in protective isolation (e.g., an isolation room of a bone marrow transplant unit)? No    13. Has the person to be vaccinated received any other vaccinations in the past 4 weeks? No

## 2018-12-17 ENCOUNTER — MEDICAL CORRESPONDENCE (OUTPATIENT)
Dept: HEALTH INFORMATION MANAGEMENT | Facility: CLINIC | Age: 18
End: 2018-12-17

## 2019-01-10 DIAGNOSIS — Z87.2: ICD-10-CM

## 2019-01-14 RX ORDER — MUPIROCIN 20 MG/G
OINTMENT TOPICAL
Start: 2019-01-14

## 2019-01-14 NOTE — TELEPHONE ENCOUNTER
Routing refill request to provider for review/approval because:  Drug not on the [facility] refill protocol     Requested Prescriptions   Pending Prescriptions Disp Refills     mupirocin (BACTROBAN) 2 % external ointment [Pharmacy Med Name: MUPIROCIN 2% OINTMENT 22GM] 22 g 0     Sig: APPLY TOPICALLY TO THE AFFECTED AREA THREE TIMES DAILY AS NEEDED    There is no refill protocol information for this order        Carmen Bueno RN  [facility]

## 2019-01-14 NOTE — TELEPHONE ENCOUNTER
Refused Prescriptions:                       Disp   Refills    mupirocin (BACTROBAN) 2 % external ointmen*                Sig: APPLY TOPICALLY TO THE AFFECTED AREA THREE TIMES           DAILY AS NEEDED  Refused By: VERENA LEE  Reason for Refusal: Appt required, please call patient

## 2019-02-01 ENCOUNTER — TRANSFERRED RECORDS (OUTPATIENT)
Dept: HEALTH INFORMATION MANAGEMENT | Facility: CLINIC | Age: 19
End: 2019-02-01

## 2019-04-28 ENCOUNTER — DOCUMENTATION ONLY (OUTPATIENT)
Dept: FAMILY MEDICINE | Facility: CLINIC | Age: 19
End: 2019-04-28

## 2019-04-28 NOTE — PROGRESS NOTES
This patient has overdue labs. A letter was sent on 3/20/2019 and there has been no lab appointment made. If you still want these labs done, please have your care team contact the patient to make a lab appointment. Otherwise, please have the labs discontinued and close the encounter.    Thank you,  [facility]

## 2019-08-13 ENCOUNTER — OFFICE VISIT (OUTPATIENT)
Dept: FAMILY MEDICINE | Facility: CLINIC | Age: 19
End: 2019-08-13
Payer: COMMERCIAL

## 2019-08-13 VITALS
SYSTOLIC BLOOD PRESSURE: 110 MMHG | BODY MASS INDEX: 33.17 KG/M2 | HEIGHT: 58 IN | DIASTOLIC BLOOD PRESSURE: 64 MMHG | WEIGHT: 158 LBS | OXYGEN SATURATION: 98 % | TEMPERATURE: 98 F | HEART RATE: 73 BPM

## 2019-08-13 DIAGNOSIS — Q90.9 DOWN'S SYNDROME: ICD-10-CM

## 2019-08-13 DIAGNOSIS — F63.3 TRICHOTILLOMANIA: ICD-10-CM

## 2019-08-13 DIAGNOSIS — Z11.4 SCREENING FOR HIV (HUMAN IMMUNODEFICIENCY VIRUS): ICD-10-CM

## 2019-08-13 DIAGNOSIS — E66.9 OBESITY WITHOUT SERIOUS COMORBIDITY, UNSPECIFIED CLASSIFICATION, UNSPECIFIED OBESITY TYPE: ICD-10-CM

## 2019-08-13 DIAGNOSIS — F41.1 GENERALIZED ANXIETY DISORDER: Primary | ICD-10-CM

## 2019-08-13 PROCEDURE — 99214 OFFICE O/P EST MOD 30 MIN: CPT | Performed by: FAMILY MEDICINE

## 2019-08-13 RX ORDER — DESVENLAFAXINE 50 MG/1
50 TABLET, FILM COATED, EXTENDED RELEASE ORAL DAILY
Qty: 90 TABLET | Refills: 1 | Status: SHIPPED | OUTPATIENT
Start: 2019-08-13 | End: 2020-01-30

## 2019-08-13 RX ORDER — LORAZEPAM 0.5 MG/1
0.5 TABLET ORAL EVERY 6 HOURS PRN
Qty: 20 TABLET | Refills: 0 | Status: SHIPPED | OUTPATIENT
Start: 2019-08-13 | End: 2019-11-05

## 2019-08-13 RX ORDER — DESVENLAFAXINE 25 MG/1
25 TABLET, EXTENDED RELEASE ORAL DAILY
Qty: 90 TABLET | Refills: 3 | Status: CANCELLED | OUTPATIENT
Start: 2019-08-13

## 2019-08-13 RX ORDER — TRAZODONE HYDROCHLORIDE 50 MG/1
25 TABLET, FILM COATED ORAL AT BEDTIME
Qty: 45 TABLET | Refills: 3 | Status: SHIPPED | OUTPATIENT
Start: 2019-08-13 | End: 2020-04-24

## 2019-08-13 RX ORDER — LEVONORGESTREL AND ETHINYL ESTRADIOL 0.15-0.03
1 KIT ORAL
COMMUNITY
Start: 2019-02-11 | End: 2020-02-17

## 2019-08-13 RX ORDER — LORAZEPAM 0.5 MG/1
0.5 TABLET ORAL EVERY 6 HOURS PRN
Refills: 0 | COMMUNITY
Start: 2019-02-13 | End: 2019-08-13

## 2019-08-13 RX ORDER — FOLIC ACID 1 MG/1
1 TABLET ORAL DAILY
Qty: 90 TABLET | Refills: 3 | Status: SHIPPED | OUTPATIENT
Start: 2019-08-13 | End: 2020-08-05

## 2019-08-13 RX ORDER — CHOLECALCIFEROL (VITAMIN D3) 50 MCG
1 TABLET ORAL DAILY
Qty: 90 TABLET | Refills: 3 | Status: SHIPPED | OUTPATIENT
Start: 2019-08-13 | End: 2020-09-01

## 2019-08-13 RX ORDER — MULTIVIT-MIN/IRON/FOLIC ACID/K 18-600-40
2000 CAPSULE ORAL
Status: CANCELLED | OUTPATIENT
Start: 2019-08-13

## 2019-08-13 ASSESSMENT — ENCOUNTER SYMPTOMS
SLEEP DISTURBANCE: 1
NERVOUS/ANXIOUS: 1

## 2019-08-13 ASSESSMENT — MIFFLIN-ST. JEOR: SCORE: 1371.43

## 2019-08-13 NOTE — PROGRESS NOTES
"Subjective     Cher bIarra is a 19 year old female who presents to clinic today with her caregiver mother for the following health issues:    Anxiety     History of Present Illness        Mental Health Follow-up:  Patient presents to follow-up on Depression & Anxiety.Patient's depression since last visit has been:  Worse  The patient is having other symptoms associated with depression.  Patient's anxiety since last visit has been:  No change  The patient is having other symptoms associated with anxiety.  Any significant life events: other  Patient is not feeling anxious or having panic attacks.  Patient has no concerns about alcohol or drug use.     Social History  Tobacco Use    Smoking status: Never Smoker    Smokeless tobacco: Never Used  Alcohol use: No  Drug use: No      Today's PHQ-9         PHQ-9 Total Score:         PHQ-9 Q9 Thoughts of better off dead/self-harm past 2 weeks :       Thoughts of suicide or self harm:      Self-harm Plan:        Self-harm Action:          Safety concerns for self or others:           She eats 2-3 servings of fruits and vegetables daily.She consumes 2 sweetened beverage(s) daily.  She is taking medications regularly.               Reviewed and updated as needed this visit by Provider  Tobacco  Allergies  Meds  Problems  Med Hx  Surg Hx  Fam Hx         Review of Systems   Skin:        Hair pulling/hair loss   Psychiatric/Behavioral: Positive for sleep disturbance. Negative for behavioral problems. The patient is nervous/anxious.      History difficult to obtain due to patient's mental status.           Objective    /64   Pulse 73   Temp 98  F (36.7  C) (Oral)   Ht 1.473 m (4' 10\")   Wt 71.7 kg (158 lb)   LMP 05/13/2019   SpO2 98%   Breastfeeding? No   BMI 33.02 kg/m    Body mass index is 33.02 kg/m .  Physical Exam   GENERAL: alert and no distress  MS: extremities normal- no gross deformities noted  SKIN: no suspicious lesions or rashes  NEURO: abnormal " "mental status - sparse speech and poor eye contact   PSYCH: affect normal/bright    Diagnostic Test Results:  Labs reviewed in Epic  Results for orders placed or performed in visit on 05/18/18   Wound Culture Aerobic Bacterial   Result Value Ref Range    Specimen Description Perineal Wound     Culture Micro Light growth  Normal skin rodger              Assessment & Plan     (F41.1) Generalized anxiety disorder  (primary encounter diagnosis)  Comment: Well controlled with medications without side effects.   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  Plan: offered psychiatry referral, but discussed difficulty of managing this symptom     (Q90.9) Down's syndrome  Plan: folic acid (FOLVITE) 1 MG tablet, vitamin D3         (CHOLECALCIFEROL) 2000 units (50 mcg) tablet,         TSH with free T4 reflex          (E66.9) Obesity without serious comorbidity, unspecified classification, unspecified obesity type  Plan: Lipid panel reflex to direct LDL Fasting, TSH         with free T4 reflex          (Z11.4) Screening for HIV (human immunodeficiency virus)  Plan: HIV Antigen Antibody Combo             BMI:   Estimated body mass index is 33.02 kg/m  as calculated from the following:    Height as of this encounter: 1.473 m (4' 10\").    Weight as of this encounter: 71.7 kg (158 lb).   Weight management plan: Discussed healthy diet and exercise guidelines            Return in about 3 months (around 11/13/2019) for physical (fasting labs up to one week prior).    Paloma Diaz MD  [facility]    "

## 2019-09-03 ENCOUNTER — MYC MEDICAL ADVICE (OUTPATIENT)
Dept: FAMILY MEDICINE | Facility: CLINIC | Age: 19
End: 2019-09-03

## 2019-09-03 DIAGNOSIS — Z23 FLU VACCINE NEED: ICD-10-CM

## 2019-09-03 DIAGNOSIS — E66.9 OBESITY WITHOUT SERIOUS COMORBIDITY, UNSPECIFIED CLASSIFICATION, UNSPECIFIED OBESITY TYPE: ICD-10-CM

## 2019-09-03 DIAGNOSIS — Q90.9 DOWN'S SYNDROME: Primary | ICD-10-CM

## 2019-09-03 DIAGNOSIS — F41.1 GENERALIZED ANXIETY DISORDER: ICD-10-CM

## 2019-09-03 NOTE — TELEPHONE ENCOUNTER
Please call [facility] to see if they will schedule procedure-only visits for adult patients with Down's for conscious sedation or nitrous oxide for routine labs and vaccines. I would not do the procedures.     Paloma Diaz MD

## 2019-09-04 NOTE — TELEPHONE ENCOUNTER
Physician access returned call and spoke with MA. Informed the physician that will be seeing patient name is Dr. Ruelsa. Orders are faxed over just needing flu vaccine. Patient is scheduled 09/19/19 8:30am [place] location. Called mom and informed scheduled appointment and asked to arrive 15 minutes early due to check in on second floor then will have to walk to short stay. Mom verbally understood and thanked MA. No preop is needed per physician access. Marika Altamirano MA

## 2019-09-10 ENCOUNTER — TELEPHONE (OUTPATIENT)
Dept: FAMILY MEDICINE | Facility: CLINIC | Age: 19
End: 2019-09-10

## 2019-09-10 NOTE — TELEPHONE ENCOUNTER
Prior Authorization Retail Medication Request    Medication/Dose: desvenlafaxine (PRISTIQ) 50 MG 24 hr table  ICD code (if different than what is on RX):    Previously Tried and Failed:    Rationale: insurance does not cover medication     Insurance Name: MEDICA ACCESS ABILITY [place]  Insurance ID:  [id number]      Pharmacy Information (if different than what is on RX)  Name:  [facility]  Phone:  [phone]

## 2019-09-12 NOTE — TELEPHONE ENCOUNTER
From [employer] message 2019    Question about medications     MilandCher Elise, MD 1 minute ago (9:53 AM)         Dear Dr. Diaz,     This is pretty urgent but we've been told by [facility] that Cher's Prior Authorization has  on her desvenlafaxine (pristque) medication and we're down to one tablet right now and we leave for [facility] on .  We really need to expedite renewing her prior authorization by you in an urgent manner please, thank you!  Call me if you have any questions - [phone] we use the [facility] in [place] by [facility] on [address] and [address].  thank you

## 2019-09-12 NOTE — TELEPHONE ENCOUNTER
Central Prior Authorization Team   Phone: [phone]    PA Initiation    Medication: desvenlafaxine (PRISTIQ) 50 MG 24 hr tablet  Insurance Company: [employer] - Phone [phone] Fax [phone]  [id number] [address] [facility] [address]   Filling Pharmacy Phone: [phone]  Filling Pharmacy Fax:    Start Date: 9/12/2019    GILLIAN MUNIZ ([place]: [id number])

## 2019-09-13 NOTE — TELEPHONE ENCOUNTER
Central Prior Authorization Team   Phone: [phone]    Prior Authorization Approval    Authorization Effective Date: 9/12/2019  Authorization Expiration Date: 9/12/2020  Medication: desvenlafaxine (PRISTIQ) 50 MG 24 hr tablet  Approved Dose/Quantity:   Reference #: [id number]   Insurance Company: [employer] - Phone [phone] Fax [phone]  Expected CoPay:       CoPay Card Available:      Foundation Assistance Needed:    Which Pharmacy is filling the prescription (Not needed for infusion/clinic administered): [id number] - [address] AT SEC OF [place] [address]  Pharmacy Notified: Yes  Patient Notified: Yes  **Instructed pharmacy to notify patient when script is ready to /ship.**

## 2019-09-19 ENCOUNTER — TRANSFERRED RECORDS (OUTPATIENT)
Dept: HEALTH INFORMATION MANAGEMENT | Facility: CLINIC | Age: 19
End: 2019-09-19

## 2019-09-19 LAB
CHOLEST SERPL-MCNC: 193 MG/DL
HDLC SERPL-MCNC: 59 MG/DL (ref 40–60)
LDLC SERPL CALC-MCNC: 129 MG/DL (ref 0–100)
TRIGL SERPL-MCNC: 54 MG/DL (ref 0–149)
TSH SERPL-ACNC: 1.44 UIU/ML (ref 0.5–4.8)

## 2019-09-23 ENCOUNTER — MYC MEDICAL ADVICE (OUTPATIENT)
Dept: FAMILY MEDICINE | Facility: CLINIC | Age: 19
End: 2019-09-23

## 2019-09-23 DIAGNOSIS — Q90.9 DOWN'S SYNDROME: Primary | ICD-10-CM

## 2019-09-23 NOTE — TELEPHONE ENCOUNTER
Dr. Diaz,  Please see [employer] message below and advise. Thanks.    Carmen Bueno RN  [facility]

## 2019-09-25 ENCOUNTER — ANCILLARY PROCEDURE (OUTPATIENT)
Dept: GENERAL RADIOLOGY | Facility: CLINIC | Age: 19
End: 2019-09-25
Attending: FAMILY MEDICINE
Payer: COMMERCIAL

## 2019-09-25 DIAGNOSIS — Q90.9 DOWN'S SYNDROME: ICD-10-CM

## 2019-09-25 PROCEDURE — 72040 X-RAY EXAM NECK SPINE 2-3 VW: CPT

## 2019-09-26 ENCOUNTER — MYC MEDICAL ADVICE (OUTPATIENT)
Dept: FAMILY MEDICINE | Facility: CLINIC | Age: 19
End: 2019-09-26

## 2019-09-27 ENCOUNTER — MYC MEDICAL ADVICE (OUTPATIENT)
Dept: FAMILY MEDICINE | Facility: CLINIC | Age: 19
End: 2019-09-27

## 2019-10-01 DIAGNOSIS — H69.92 DYSFUNCTION OF LEFT EUSTACHIAN TUBE: ICD-10-CM

## 2019-10-01 RX ORDER — FLUTICASONE PROPIONATE 50 MCG
SPRAY, SUSPENSION (ML) NASAL
Qty: 16 ML | Refills: 0 | Status: SHIPPED | OUTPATIENT
Start: 2019-10-01 | End: 2019-11-05

## 2019-10-01 NOTE — RESULT ENCOUNTER NOTE
"Please call patient's parent:    I have reviewed Cher's x-ray with the radiologist. While the x-ray shows borderline criteria for \"atlantoaxial instability,\" a repeat x-ray at a specialty Down's clinic may be most helpful to determine ongoing need for sports restrictions. A yearly follow-up x-ray is also advised.     The [facility] is an option, but openings are next available in June 2020 ([phone]). [facility]. Let's discuss these options at a follow-up appointment.     Paloma Diaz MD  "

## 2019-10-14 ENCOUNTER — OFFICE VISIT (OUTPATIENT)
Dept: OPHTHALMOLOGY | Facility: CLINIC | Age: 19
End: 2019-10-14
Payer: COMMERCIAL

## 2019-10-14 DIAGNOSIS — Q90.9 DOWN'S SYNDROME: ICD-10-CM

## 2019-10-14 DIAGNOSIS — H52.13 MYOPIA OF BOTH EYES: ICD-10-CM

## 2019-10-14 DIAGNOSIS — H50.012 ESOTROPIA OF LEFT EYE: Primary | ICD-10-CM

## 2019-10-14 PROCEDURE — 92014 COMPRE OPH EXAM EST PT 1/>: CPT | Performed by: STUDENT IN AN ORGANIZED HEALTH CARE EDUCATION/TRAINING PROGRAM

## 2019-10-14 PROCEDURE — 92015 DETERMINE REFRACTIVE STATE: CPT | Performed by: STUDENT IN AN ORGANIZED HEALTH CARE EDUCATION/TRAINING PROGRAM

## 2019-10-14 ASSESSMENT — REFRACTION_MANIFEST
OS_AXIS: 126
OD_CYLINDER: +1.50
OS_CYLINDER: +1.75
OD_SPHERE: -11.00
OD_AXIS: 046
OS_SPHERE: -10.25

## 2019-10-14 ASSESSMENT — CONF VISUAL FIELD: OD_NORMAL: 1

## 2019-10-14 ASSESSMENT — REFRACTION_WEARINGRX
OS_CYLINDER: +0.75
OS_SPHERE: -8.50
OD_SPHERE: -10.50
SPECS_TYPE: SVL
OD_AXIS: 045
OS_AXIS: 135
OD_CYLINDER: +1.50

## 2019-10-14 ASSESSMENT — CUP TO DISC RATIO
OS_RATIO: 0.1
OD_RATIO: 0.1

## 2019-10-14 ASSESSMENT — VISUAL ACUITY
OS_CC: J8
CORRECTION_TYPE: GLASSES
OD_CC: J10
OD_CC: 20/70
METHOD: SNELLEN - LINEAR
OD_CC+: -1
OS_CC+: -2
OS_CC: 20/60

## 2019-10-14 ASSESSMENT — SLIT LAMP EXAM - LIDS
COMMENTS: NORMAL
COMMENTS: NORMAL

## 2019-10-14 ASSESSMENT — EXTERNAL EXAM - RIGHT EYE: OD_EXAM: NORMAL

## 2019-10-14 ASSESSMENT — EXTERNAL EXAM - LEFT EYE: OS_EXAM: NORMAL

## 2019-10-14 NOTE — PROGRESS NOTES
Current Eye Medications:  none     Subjective:  Comprehensive Eye Exam. Cher takes glasses off to read or looks under them.  Glasses are ok for distance.  No complaints on the comfort of her eyes.      Objective:  See Ophthalmology Exam.       Assessment:  Cher Ibarra is a 19 year old female who presents with:   Encounter Diagnoses   Name Primary?     Esotropia of left eye      High myopia of both eyes      Down's syndrome        Plan:  Glasses prescription given - optional to update    Amilcar Sanchez MD  [phone]

## 2019-10-14 NOTE — LETTER
10/14/2019         RE: Cher Ibarra  [address] [address]        Dear Colleague,    Thank you for referring your patient, Cher Ibarra, to the [facility]. Please see a copy of my visit note below.     Current Eye Medications:  none     Subjective:  Comprehensive Eye Exam. Cher takes glasses off to read or looks under them.  Glasses are ok for distance.  No complaints on the comfort of her eyes.      Objective:  See Ophthalmology Exam.       Assessment:  Cher Ibarra is a 19 year old female who presents with:   Encounter Diagnoses   Name Primary?     Esotropia of left eye      High myopia of both eyes      Down's syndrome        Plan:  Glasses prescription given - optional to update    Amilcar Sanchez MD  [phone]        Again, thank you for allowing me to participate in the care of your patient.        Sincerely,        Amilcar Sanchez MD

## 2019-10-24 ENCOUNTER — OFFICE VISIT (OUTPATIENT)
Dept: OTOLARYNGOLOGY | Facility: CLINIC | Age: 19
End: 2019-10-24
Payer: COMMERCIAL

## 2019-10-24 ENCOUNTER — OFFICE VISIT (OUTPATIENT)
Dept: AUDIOLOGY | Facility: CLINIC | Age: 19
End: 2019-10-24
Payer: COMMERCIAL

## 2019-10-24 VITALS
DIASTOLIC BLOOD PRESSURE: 71 MMHG | WEIGHT: 158 LBS | OXYGEN SATURATION: 96 % | HEIGHT: 58 IN | SYSTOLIC BLOOD PRESSURE: 103 MMHG | BODY MASS INDEX: 33.17 KG/M2 | HEART RATE: 86 BPM

## 2019-10-24 DIAGNOSIS — H69.93 DISORDER OF BOTH EUSTACHIAN TUBES: ICD-10-CM

## 2019-10-24 DIAGNOSIS — H90.0 CONDUCTIVE HEARING LOSS, BILATERAL: Primary | ICD-10-CM

## 2019-10-24 DIAGNOSIS — H90.3 SENSORINEURAL HEARING LOSS, BILATERAL: Primary | ICD-10-CM

## 2019-10-24 DIAGNOSIS — R42 DIZZINESS: ICD-10-CM

## 2019-10-24 PROCEDURE — 99214 OFFICE O/P EST MOD 30 MIN: CPT | Performed by: OTOLARYNGOLOGY

## 2019-10-24 PROCEDURE — 92567 TYMPANOMETRY: CPT | Performed by: AUDIOLOGIST

## 2019-10-24 PROCEDURE — 92557 COMPREHENSIVE HEARING TEST: CPT | Performed by: AUDIOLOGIST

## 2019-10-24 PROCEDURE — 99207 ZZC NO CHARGE LOS: CPT | Performed by: AUDIOLOGIST

## 2019-10-24 ASSESSMENT — MIFFLIN-ST. JEOR: SCORE: 1371.43

## 2019-10-24 NOTE — PROGRESS NOTES
Chief Complaint - Dizziness    History of Present Illness - Cher Ibarra is a 19 year old female who presents with dizziness. I've seen her before for ETD. She has Down's, but never had ear tubes. No otorrhea. Mom has a history of ear infections. The patient is with mom. She describes dizziness with movement. It lasts for seconds.  This has been going on for 3 months. Mom was wondering if it is from not drinking enough fluids. It is always from getting up after being supine or sitting. No ear infections. No otalgia.     Past Medical History -   Patient Active Problem List   Diagnosis     Down's syndrome     Sleep disturbance     Chronic rhinitis     Dysmenorrhea     Anxiety disorder     Recurrent boils     Obesity     Trichotillomania       Current Medications -   Current Outpatient Medications:      acetylcysteine (NAC) 500 MG CAPS capsule, Take 1,000 mg by mouth 2 times daily , Disp: , Rfl:      ammonium lactate (LAC-HYDRIN) 12 % cream, Apply topically 2 times daily as needed for dry skin, Disp: , Rfl:      cetirizine (ZYRTEC) 10 MG tablet, Take 1 tablet (10 mg) by mouth every evening, Disp: 90 tablet, Rfl: 1     desvenlafaxine (PRISTIQ) 50 MG 24 hr tablet, Take 1 tablet (50 mg) by mouth daily, Disp: 90 tablet, Rfl: 1     fluticasone (FLONASE) 50 MCG/ACT nasal spray, SHAKE LIQUID AND USE 1 TO 2 SPRAYS IN EACH NOSTRIL DAILY, Disp: 16 mL, Rfl: 0     folic acid (FOLVITE) 1 MG tablet, Take 1 tablet (1 mg) by mouth daily, Disp: 90 tablet, Rfl: 3     levonorgestrel-ethinyl estradiol (SEASONALE) 0.15-0.03 MG tablet, Take 1 tablet by mouth, Disp: , Rfl:      LORazepam (ATIVAN) 0.5 MG tablet, Take 1 tablet (0.5 mg) by mouth every 6 hours as needed for anxiety . No further refills until follow-up appointment, Disp: 20 tablet, Rfl: 0     melatonin 5 MG tablet, None Entered, Disp: , Rfl:      mupirocin (BACTROBAN) 2 % ointment, APPLY EXTERNALLY TO THE AFFECTED AREA THREE TIMES DAILY AS NEEDED, Disp: 22 g, Rfl: 0      naproxen (NAPROSYN) 500 MG tablet, Take 1 tablet (500 mg) by mouth 2 times daily as needed, Disp: 60 tablet, Rfl: 1     Omega-3 Fatty Acids (OMEGA-3 FISH OIL PO), Take 1 g by mouth daily, Disp: , Rfl:      traZODone (DESYREL) 50 MG tablet, Take 0.5 tablets (25 mg) by mouth At Bedtime, Disp: 45 tablet, Rfl: 3     triamcinolone (KENALOG) 0.5 % cream, Apply  topically 2 times daily. to affected area as directed., Disp: 30 g, Rfl: 0     vitamin D3 (CHOLECALCIFEROL) 2000 units (50 mcg) tablet, Take 1 tablet (2,000 Units) by mouth daily, Disp: 90 tablet, Rfl: 3    Allergies -   Allergies   Allergen Reactions     Azithromycin Hives     Codeine      Hydrocodone      Oxycodone      Tramadol      Zithromax [Azithromycin Dihydrate]        Social History -   Social History     Socioeconomic History     Marital status: Single     Spouse name: Not on file     Number of children: 0     Years of education: Not on file     Highest education level: Not on file   Occupational History     Employer: STUDENT   Social Needs     Financial resource strain: Not on file     Food insecurity:     Worry: Not on file     Inability: Not on file     Transportation needs:     Medical: Not on file     Non-medical: Not on file   Tobacco Use     Smoking status: Never Smoker     Smokeless tobacco: Never Used   Substance and Sexual Activity     Alcohol use: No     Drug use: No     Sexual activity: Never   Lifestyle     Physical activity:     Days per week: Not on file     Minutes per session: Not on file     Stress: Not on file   Relationships     Social connections:     Talks on phone: Not on file     Gets together: Not on file     Attends Rastafari service: Not on file     Active member of club or organization: Not on file     Attends meetings of clubs or organizations: Not on file     Relationship status: Not on file     Intimate partner violence:     Fear of current or ex partner: Not on file     Emotionally abused: Not on file     Physically abused:  "Not on file     Forced sexual activity: Not on file   Other Topics Concern     Parent/sibling w/ CABG, MI or angioplasty before 65F 55M? No   Social History Narrative     Not on file       Family History -   Family History   Problem Relation Age of Onset     Cerebrovascular Disease Mother      Depression Mother      Anxiety Disorder Mother      Obesity Mother      Anesthesia Reaction Maternal Grandmother      C.A.D. No family hx of      Diabetes No family hx of      Hypertension No family hx of      Breast Cancer No family hx of      Prostate Cancer No family hx of      Asthma No family hx of        Review of Systems - As per HPI and PMHx, otherwise 10+ comprehensive system review is negative.    Physical Exam  /71   Pulse 86   Ht 1.473 m (4' 10\")   Wt 71.7 kg (158 lb)   SpO2 96%   BMI 33.02 kg/m    General - The patient is in no distress.  Answers questions and cooperates with examination reasonably, but wouldn't let me clean her ears.   Voice and Breathing - The patient was breathing comfortably without the use of accessory muscles. There was no wheezing, stridor, or stertor.  Eyes - Strabismus. Extraocular movements intact. Sclera were not icteric or injected, conjunctiva were pink and moist. No nystagmus.  Ears - The auricles appeared normal. The external auditory canals were nonedematous and nonerythematous, but small. The tympanic membranes are angled more in a lateral way. Some tympanosclerosis, but no retraction, perforation, or masses. No fluid or purulence was seen in the external canal or the middle ear.   Neurologic - Cranial nerves II-XII are grossly intact.  Hustler Hallpike was negative.  Neck -  Palpation of the occipital, submental, submandibular, internal jugular chain, and supraclavicular nodes did not demonstrate any abnormal lymph nodes or masses. The parotid glands were without masses. Palpation of the thyroid was soft and smooth, with no nodules or goiter appreciated.  The trachea was " midline.        Audiologic Studies - An audiogram and tympanogram were performed today as part of the evaluation and personally reviewed. The tympanogram shows normal Type B curves, low volume. She has a mild conductive hearing loss    A/P - Cher Ibarra is a 19 year old female with dizziness. This seems most likely syncopal in nature.  This could be due to dehydration or some other cardiovascular issue.  I do not think it is an inner ear problem.  [place]-Hallpike was negative today.  In addition it seems to only come on when the patient gets up from a seated or supine position.  I will have her discuss this issue with her primary care physician.  Repeat audiogram in 2 years given the mild conductive hearing loss.      Raymon Kelsey MD  Otolaryngology  [facility]

## 2019-10-24 NOTE — PATIENT INSTRUCTIONS
General Scheduling Information  To schedule your CT/MRI scan, please contact [place] [facility] at [phone] [address] [place] [address]    To schedule your Surgery, please contact our Specialty Schedulers at [phone]    ENT Clinic Locations Clinic Hours Telephone Number     [facility]  [address] [place] [address]   Tuesday:       8:00am -- 4:00pm    Wednesday:  8:00am - 4:00pm   To schedule an appointment with   Dr. Kelsey,   please contact our   Specialty Scheduling Department at:     [phone]       [facility]  [address] [place] [address]   Friday:          8:00am - 4:00pm         Urgent Care Locations Clinic Hours Telephone Numbers     [facility]  [address] [place] [address]     Monday-Friday:     11:00pm - 9:00pm    Saturday-Sunday:  9:00am - 5:00pm   [phone]     [facility]  [address] [place] [address]     Monday-Friday:      5:00pm - 9:00pm     Saturday-Sunday:  9:00am - 5:00pm   [phone]

## 2019-10-24 NOTE — PROGRESS NOTES
AUDIOLOGY REPORT:    Patient was referred to [facility] from ENT by Dr. Kelsey for a hearing examination. They were accompanied today by their mother who reports they are here for a hearing check as this is something that is done for her every few years.     Testing:    Otoscopy:   Otoscopic exam indicates ears are clear of cerumen bilaterally     Tympanograms:    RIGHT: restricted eardrum mobility (Type B)     LEFT:   restricted eardrum mobility (Type B)    Thresholds:   Pure Tone Thresholds assessed using conventional audiometry with fair to good  reliability from 250-8000 Hz bilaterally using insert earphones and TDH headphones     RIGHT:  borderline-normal and mild sensorineural hearing loss    LEFT:    borderline-normal and mild sensorineural hearing loss    NOTE: change in transducers did not merit a change in thresholds.    Speech Reception Threshold:    RIGHT: 20 dB HL    LEFT:   30 dB HL    Word Recognition Score:     RIGHT: 100% at 65 dB HL using NU-6 word list with monitored live voice.    LEFT:   100% at 65 dB HL using NU-6 word list with monitored live voice.   NOTE: patient would not respond when presented the recorded word lists.     Discussed results with the patient and her mother.     Patient was returned to ENT for follow up.     Jordan Palomo CCC-A  Licensed Audiologist  10/24/2019

## 2019-10-24 NOTE — LETTER
10/24/2019         RE: Cher Ibarra  [address] [facility] [id number]        Dear Colleague,    Thank you for referring your patient, Cher Ibarra, to the [facility]. Please see a copy of my visit note below.    Chief Complaint - Dizziness    History of Present Illness - Cher Ibarra is a 19 year old female who presents with dizziness. I've seen her before for ETD. She has Down's, but never had ear tubes. No otorrhea. Mom has a history of ear infections. The patient is with mom. She describes dizziness with movement. It lasts for seconds.  This has been going on for 3 months. Mom was wondering if it is from not drinking enough fluids. It is always from getting up after being supine or sitting. No ear infections. No otalgia.     Past Medical History -   Patient Active Problem List   Diagnosis     Down's syndrome     Sleep disturbance     Chronic rhinitis     Dysmenorrhea     Anxiety disorder     Recurrent boils     Obesity     Trichotillomania       Current Medications -   Current Outpatient Medications:      acetylcysteine (NAC) 500 MG CAPS capsule, Take 1,000 mg by mouth 2 times daily , Disp: , Rfl:      ammonium lactate (LAC-HYDRIN) 12 % cream, Apply topically 2 times daily as needed for dry skin, Disp: , Rfl:      cetirizine (ZYRTEC) 10 MG tablet, Take 1 tablet (10 mg) by mouth every evening, Disp: 90 tablet, Rfl: 1     desvenlafaxine (PRISTIQ) 50 MG 24 hr tablet, Take 1 tablet (50 mg) by mouth daily, Disp: 90 tablet, Rfl: 1     fluticasone (FLONASE) 50 MCG/ACT nasal spray, SHAKE LIQUID AND USE 1 TO 2 SPRAYS IN EACH NOSTRIL DAILY, Disp: 16 mL, Rfl: 0     folic acid (FOLVITE) 1 MG tablet, Take 1 tablet (1 mg) by mouth daily, Disp: 90 tablet, Rfl: 3     levonorgestrel-ethinyl estradiol (SEASONALE) 0.15-0.03 MG tablet, Take 1 tablet by mouth, Disp: , Rfl:      LORazepam (ATIVAN) 0.5 MG tablet, Take 1 tablet (0.5 mg) by mouth every 6 hours as needed for anxiety . No  further refills until follow-up appointment, Disp: 20 tablet, Rfl: 0     melatonin 5 MG tablet, None Entered, Disp: , Rfl:      mupirocin (BACTROBAN) 2 % ointment, APPLY EXTERNALLY TO THE AFFECTED AREA THREE TIMES DAILY AS NEEDED, Disp: 22 g, Rfl: 0     naproxen (NAPROSYN) 500 MG tablet, Take 1 tablet (500 mg) by mouth 2 times daily as needed, Disp: 60 tablet, Rfl: 1     Omega-3 Fatty Acids (OMEGA-3 FISH OIL PO), Take 1 g by mouth daily, Disp: , Rfl:      traZODone (DESYREL) 50 MG tablet, Take 0.5 tablets (25 mg) by mouth At Bedtime, Disp: 45 tablet, Rfl: 3     triamcinolone (KENALOG) 0.5 % cream, Apply  topically 2 times daily. to affected area as directed., Disp: 30 g, Rfl: 0     vitamin D3 (CHOLECALCIFEROL) 2000 units (50 mcg) tablet, Take 1 tablet (2,000 Units) by mouth daily, Disp: 90 tablet, Rfl: 3    Allergies -   Allergies   Allergen Reactions     Azithromycin Hives     Codeine      Hydrocodone      Oxycodone      Tramadol      Zithromax [Azithromycin Dihydrate]        Social History -   Social History     Socioeconomic History     Marital status: Single     Spouse name: Not on file     Number of children: 0     Years of education: Not on file     Highest education level: Not on file   Occupational History     Employer: STUDENT   Social Needs     Financial resource strain: Not on file     Food insecurity:     Worry: Not on file     Inability: Not on file     Transportation needs:     Medical: Not on file     Non-medical: Not on file   Tobacco Use     Smoking status: Never Smoker     Smokeless tobacco: Never Used   Substance and Sexual Activity     Alcohol use: No     Drug use: No     Sexual activity: Never   Lifestyle     Physical activity:     Days per week: Not on file     Minutes per session: Not on file     Stress: Not on file   Relationships     Social connections:     Talks on phone: Not on file     Gets together: Not on file     Attends Confucianism service: Not on file     Active member of club or  "organization: Not on file     Attends meetings of clubs or organizations: Not on file     Relationship status: Not on file     Intimate partner violence:     Fear of current or ex partner: Not on file     Emotionally abused: Not on file     Physically abused: Not on file     Forced sexual activity: Not on file   Other Topics Concern     Parent/sibling w/ CABG, MI or angioplasty before 65F 55M? No   Social History Narrative     Not on file       Family History -   Family History   Problem Relation Age of Onset     Cerebrovascular Disease Mother      Depression Mother      Anxiety Disorder Mother      Obesity Mother      Anesthesia Reaction Maternal Grandmother      C.A.D. No family hx of      Diabetes No family hx of      Hypertension No family hx of      Breast Cancer No family hx of      Prostate Cancer No family hx of      Asthma No family hx of        Review of Systems - As per HPI and PMHx, otherwise 10+ comprehensive system review is negative.    Physical Exam  /71   Pulse 86   Ht 1.473 m (4' 10\")   Wt 71.7 kg (158 lb)   SpO2 96%   BMI 33.02 kg/m     General - The patient is in no distress.  Answers questions and cooperates with examination reasonably, but wouldn't let me clean her ears.   Voice and Breathing - The patient was breathing comfortably without the use of accessory muscles. There was no wheezing, stridor, or stertor.  Eyes - Strabismus. Extraocular movements intact. Sclera were not icteric or injected, conjunctiva were pink and moist. No nystagmus.  Ears - The auricles appeared normal. The external auditory canals were nonedematous and nonerythematous, but small. The tympanic membranes are angled more in a lateral way. Some tympanosclerosis, but no retraction, perforation, or masses. No fluid or purulence was seen in the external canal or the middle ear.   Neurologic - Cranial nerves II-XII are grossly intact.  Srinivasan Hallpike was negative.  Neck -  Palpation of the occipital, submental, " submandibular, internal jugular chain, and supraclavicular nodes did not demonstrate any abnormal lymph nodes or masses. The parotid glands were without masses. Palpation of the thyroid was soft and smooth, with no nodules or goiter appreciated.  The trachea was midline.        Audiologic Studies - An audiogram and tympanogram were performed today as part of the evaluation and personally reviewed. The tympanogram shows normal Type B curves, low volume. She has a mild conductive hearing loss    A/P - Cher Ibarra is a 19 year old female with dizziness. This seems most likely syncopal in nature.  This could be due to dehydration or some other cardiovascular issue.  I do not think it is an inner ear problem.  Srinivasan-Hallpike was negative today.  In addition it seems to only come on when the patient gets up from a seated or supine position.  I will have her discuss this issue with her primary care physician.  Repeat audiogram in 2 years given the mild conductive hearing loss.      Raymon Kelsey MD  Otolaryngology  [facility]      Again, thank you for allowing me to participate in the care of your patient.        Sincerely,        Raymon Kelsey MD

## 2019-10-24 NOTE — Clinical Note
Hi Dr. Diaz,I saw Cher for dizziness, but it is difficult to sort out. I don't think it is vertigo like from BPPV. I think it maybe syncopal, dehydration, cardiovascular, etc. See what you think. Thanks,Lior

## 2019-11-03 DIAGNOSIS — H69.92 DYSFUNCTION OF LEFT EUSTACHIAN TUBE: ICD-10-CM

## 2019-11-05 ENCOUNTER — OFFICE VISIT (OUTPATIENT)
Dept: FAMILY MEDICINE | Facility: CLINIC | Age: 19
End: 2019-11-05
Payer: COMMERCIAL

## 2019-11-05 VITALS
HEART RATE: 102 BPM | SYSTOLIC BLOOD PRESSURE: 108 MMHG | OXYGEN SATURATION: 96 % | TEMPERATURE: 97.3 F | RESPIRATION RATE: 20 BRPM | HEIGHT: 58 IN | WEIGHT: 159 LBS | BODY MASS INDEX: 33.37 KG/M2 | DIASTOLIC BLOOD PRESSURE: 74 MMHG

## 2019-11-05 DIAGNOSIS — Q90.9 DOWN'S SYNDROME: ICD-10-CM

## 2019-11-05 DIAGNOSIS — Z87.2: ICD-10-CM

## 2019-11-05 DIAGNOSIS — F41.1 GENERALIZED ANXIETY DISORDER: ICD-10-CM

## 2019-11-05 DIAGNOSIS — R93.7 ABNORMAL X-RAY OF CERVICAL SPINE: Primary | ICD-10-CM

## 2019-11-05 DIAGNOSIS — R21 SKIN RASH: ICD-10-CM

## 2019-11-05 DIAGNOSIS — H69.92 DYSFUNCTION OF LEFT EUSTACHIAN TUBE: ICD-10-CM

## 2019-11-05 PROCEDURE — 99214 OFFICE O/P EST MOD 30 MIN: CPT | Performed by: FAMILY MEDICINE

## 2019-11-05 RX ORDER — MUPIROCIN 20 MG/G
OINTMENT TOPICAL
Qty: 22 G | Refills: 0 | Status: SHIPPED | OUTPATIENT
Start: 2019-11-05 | End: 2022-07-08

## 2019-11-05 RX ORDER — FLUTICASONE PROPIONATE 50 MCG
SPRAY, SUSPENSION (ML) NASAL
Qty: 16 ML | Refills: 0 | OUTPATIENT
Start: 2019-11-05

## 2019-11-05 RX ORDER — FLUTICASONE PROPIONATE 50 MCG
1-2 SPRAY, SUSPENSION (ML) NASAL DAILY
Qty: 16 ML | Refills: 11 | Status: SHIPPED | OUTPATIENT
Start: 2019-11-05 | End: 2020-11-02

## 2019-11-05 RX ORDER — LORAZEPAM 0.5 MG/1
0.5 TABLET ORAL EVERY 6 HOURS PRN
Qty: 20 TABLET | Refills: 0 | Status: SHIPPED | OUTPATIENT
Start: 2019-11-05 | End: 2020-12-09

## 2019-11-05 RX ORDER — TRIAMCINOLONE ACETONIDE 5 MG/G
CREAM TOPICAL
Qty: 30 G | Refills: 0 | Status: SHIPPED | OUTPATIENT
Start: 2019-11-05 | End: 2020-06-15

## 2019-11-05 ASSESSMENT — MIFFLIN-ST. JEOR: SCORE: 1375.97

## 2019-11-05 NOTE — PROGRESS NOTES
"Subjective     Cher Ibarra is a 19 year old female who presents to clinic today for the following health issues:    HPI   Chief Complaint   Patient presents with     Results     Cher Ibarra is a 19 year old female who presents with her caregiver mother for follow-up of abnormal screening cervical x-ray for sports participation. She has no symptoms. History difficult to obtain due to patient's mental status.    Patient also presents for follow-up of anxiety, a problem for years.  Intermittent agitation is controlled by medication with no side effects.     She has intermittent boils and dry skin on arms related to keratosis pilaris.    Reviewed and updated as needed this visit by Provider  Tobacco  Allergies  Meds  Problems  Med Hx  Surg Hx  Fam Hx         Review of Systems   ROS COMP: CONSTITUTIONAL:POSITIVE  for weight gain  INTEGUMENTARY/SKIN: as above   EYES: NEGATIVE for vision changes or irritation  ENT/MOUTH: NEGATIVE for ear, mouth and throat problems  RESP: NEGATIVE for significant cough or SOB  CV: NEGATIVE for chest pain, palpitations or peripheral edema  GI: NEGATIVE for nausea, abdominal pain, heartburn, or change in bowel habits  : NEGATIVE for frequency, dysuria, or hematuria  MUSCULOSKELETAL: NEGATIVE for significant arthralgias or myalgia  NEURO: NEGATIVE for weakness, dizziness or paresthesias  ENDOCRINE: NEGATIVE for temperature intolerance, skin/hair changes  HEME: NEGATIVE for bleeding problems  PSYCHIATRIC: as above       Objective    /74   Pulse 102   Temp 97.3  F (36.3  C) (Oral)   Resp 20   Ht 1.473 m (4' 10\")   Wt 72.1 kg (159 lb)   LMP 08/05/2019 (Approximate)   SpO2 96%   Breastfeeding? No   BMI 33.23 kg/m    Body mass index is 33.23 kg/m .  Physical Exam   GENERAL: alert and no distress  NECK: no adenopathy, no asymmetry, masses, or scars and thyroid normal to palpation  RESP: lungs clear to auscultation - no rales, rhonchi or wheezes  CV: regular rate and " "rhythm, normal S1 S2, no S3 or S4, no murmur, click or rub, no peripheral edema and peripheral pulses strong  ABDOMEN: soft, nontender, no hepatosplenomegaly, no masses and bowel sounds normal  MS: no gross musculoskeletal defects noted, no edema  SKIN: no suspicious lesions or rashes  NEURO: Normal strength and tone, sensory exam grossly normal and abnormal mental status - Down's   PSYCH: affect normal/bright    Diagnostic Test Results:  Labs reviewed in Epic  No results found for any visits on 11/05/19.        Assessment & Plan     (R93.7) Abnormal x-ray of cervical spine  (primary encounter diagnosis)  (Q90.9) Down's syndrome  Plan: XR Cervical Spine 2/3 Views        Patient is avoiding risky activities, and we discussed repeat x-ray for confirmation of atlantoaxial instability. She will seek consult at the adult Down's clinic at [facility].     (H69.82) Dysfunction of left eustachian tube  Comment: Well controlled with medications without side effects.   Plan: fluticasone (FLONASE) 50 MCG/ACT nasal spray          (F41.1) Generalized anxiety disorder  Comment: Well controlled with medications without side effects.   Plan: LORazepam (ATIVAN) 0.5 MG tablet        No further refills until follow-up appointment     (R21) Skin rash  Comment: Well controlled with medications without side effects.   Plan: triamcinolone (ARISTOCORT HP) 0.5 % external         cream          (Z87.2) H/O furuncle  Plan: mupirocin (BACTROBAN) 2 % external ointment        Follow-up as needed        BMI:   Estimated body mass index is 33.23 kg/m  as calculated from the following:    Height as of this encounter: 1.473 m (4' 10\").    Weight as of this encounter: 72.1 kg (159 lb).   Weight management plan: Discussed healthy diet and exercise guidelines            Return in about 1 year (around 11/5/2020) for physical.    Paloma Diaz MD  [facility]    "

## 2019-11-05 NOTE — Clinical Note
Pineda - Do you know who the best one in our system would be to ask about a cervical spine x-ray interpretation and recommendations for an adult Down's patient? - the community Down's clinics for adults have a one-year waiting list. Thanks - Paloma

## 2019-11-05 NOTE — Clinical Note
Dr. Sol - Is this patient appropriate for a consult to rule out atlantoaxial instability? The adult Down's clinic access is 1 year out. Thanks - Paloma Diaz MD

## 2019-11-06 ENCOUNTER — HEALTH MAINTENANCE LETTER (OUTPATIENT)
Age: 19
End: 2019-11-06

## 2019-11-06 ENCOUNTER — ANCILLARY PROCEDURE (OUTPATIENT)
Dept: GENERAL RADIOLOGY | Facility: CLINIC | Age: 19
End: 2019-11-06
Attending: FAMILY MEDICINE
Payer: COMMERCIAL

## 2019-11-06 DIAGNOSIS — Q90.9 DOWN'S SYNDROME: ICD-10-CM

## 2019-11-06 DIAGNOSIS — R93.7 ABNORMAL X-RAY OF CERVICAL SPINE: ICD-10-CM

## 2019-11-06 PROCEDURE — 72040 X-RAY EXAM NECK SPINE 2-3 VW: CPT

## 2019-11-07 ENCOUNTER — TELEPHONE (OUTPATIENT)
Dept: FAMILY MEDICINE | Facility: CLINIC | Age: 19
End: 2019-11-07

## 2019-11-07 NOTE — RESULT ENCOUNTER NOTE
Please call patient's guardian:    This is a good result. The finding on the previous x-ray is confirmed to be normal. It is safe to participate in sports.     Paloma Diaz MD

## 2019-11-07 NOTE — TELEPHONE ENCOUNTER
Voice mail left for patients mother Safia to call RN hotline at [phone].       Notes recorded by Paloma Diaz MD on 11/7/2019 at 9:32 AM CST  Please call patient's guardian:  This is a good result. The finding on the previous x-ray is confirmed to be normal. It is safe to participate in sports.   Paloma Butcher RN

## 2019-12-31 ENCOUNTER — OFFICE VISIT (OUTPATIENT)
Dept: FAMILY MEDICINE | Facility: CLINIC | Age: 19
End: 2019-12-31
Payer: COMMERCIAL

## 2019-12-31 VITALS
OXYGEN SATURATION: 97 % | WEIGHT: 161 LBS | RESPIRATION RATE: 18 BRPM | TEMPERATURE: 96.5 F | BODY MASS INDEX: 33.8 KG/M2 | SYSTOLIC BLOOD PRESSURE: 99 MMHG | DIASTOLIC BLOOD PRESSURE: 66 MMHG | HEART RATE: 73 BPM | HEIGHT: 58 IN

## 2019-12-31 DIAGNOSIS — M67.471 GANGLION CYST OF RIGHT FOOT: ICD-10-CM

## 2019-12-31 DIAGNOSIS — Q90.9 DOWN'S SYNDROME: ICD-10-CM

## 2019-12-31 DIAGNOSIS — M67.432 GANGLION CYST OF WRIST, LEFT: Primary | ICD-10-CM

## 2019-12-31 PROCEDURE — 99213 OFFICE O/P EST LOW 20 MIN: CPT | Performed by: FAMILY MEDICINE

## 2019-12-31 ASSESSMENT — MIFFLIN-ST. JEOR: SCORE: 1385.04

## 2019-12-31 NOTE — PATIENT INSTRUCTIONS
Patient Education     Ganglion Cyst: Hand    A ganglion cyst is a firm, fluid-filled lump that can suddenly appear on the front or back of the wrist or at the base of a finger. These cysts grow from normal tissue in the wrist and fingers, and range in size from a pea to a peach pit. Although ganglion cysts are common, they don t spread, and they don t become cancerous. They can occur after an injury, but many times it isn t known why they grow. Ganglion cysts can change in size, and may go away on their own.  What are the symptoms of a ganglion cyst?  A ganglion cyst is sometimes painful, especially when it first occurs. Constantly using your hand or wrist can make the cyst enlarge and hurt more. Some hand and wrist movements, such as grasping things, may also be difficult.  How does a ganglion cyst develop?  Your wrist and hand are made up of many small bones that meet at joints. Tendons attach muscles to the bones at the joints. The tendons allow the joints to bend and straighten. Both tendons and joints are lined with tissue called synovium. This tissue makes a thick fluid that keeps the joints and tendons moving easily. Sometimes the tissue balloons out from the joint or tendons and forms a cyst. As the cyst fills with fluid and grows, it appears as a lump you can feel.  Where do ganglion cysts occur?  A ganglion cyst can occur anywhere on the hand near a joint. Cysts most commonly appear on the back or palm side of the wrist, or on the palm at the base of a finger. Your doctor can usually diagnose a cyst by examining the lump. He or she may draw off a little fluid or order an X-ray to rule out other problems.  How is a ganglion cyst treated?  Your healthcare provider may just watch your ganglion cyst. Many shrink and become painless without treatment. Some disappear altogether. If the cyst is unsightly or painful, or makes it hard for you to use your hand, your healthcare provider can treat it or, if needed,  remove it surgically.  Nonsurgical treatment  To shrink the cyst, your provider may remove (aspirate) the fluid with a needle. If the cyst hurts, your provider may also give you an injection of an anti-inflammatory, such as cortisone, to relieve the irritation. Your hand may then be wrapped to help keep the cyst from recurring.  Surgery  If the cyst reappears after treatment, your healthcare provider may remove it surgically. A section of the tissue that lines the joint or tendon is removed along with the cyst. This helps prevent another cyst from forming, although recurrence of the cyst is still possible after surgery. Usually, only your hand or arm is numbed, and you can go home a few hours after surgery. Your hand may be in a splint for several days.  Date Last Reviewed: 9/1/2017 2000-2018 [employer]. [address] [place] [address]. All rights reserved. This information is not intended as a substitute for professional medical care. Always follow your healthcare professional's instructions.           Patient Education     Ganglion Cyst: Foot  A ganglion is a fluid-filled swelling of the lining of a joint or tendon. Ganglions can form on any part of the foot. But they most often appear on the ankle or top of the foot. Ganglions tend to change in size and often grow slowly.  Causes  Repeated irritation can weaken the lining of a joint or tendon. This can lead to ganglions. Bony outgrowths (bone spurs) and arthritis may also cause ganglions by irritating the joints and tendons.  Symptoms  Ganglions often form with no symptoms. But the ganglion may put pressure on the nerves and the overlying skin. This can cause tingling, numbness, or pain. Ganglions sometimes swell. Their size can change with different activities or a change in weather.  How are they diagnosed?  Ganglions are sometimes mistaken for tumors. So it s important to have a complete exam done. Tests may be done to confirm the  diagnosis.  Health history  Your healthcare provider will ask you questions. These include how long you ve had the ganglion and what kind of symptoms you re feeling. He or she may ask if it s changed in size or if its size varies based on your activities.  Physical exam  Your healthcare provider may do a translumination exam. This involves shining a light through the swelling. You can often see through a ganglion, but not through a tumor.) When your foot is pressed (palpated), a ganglion feels spongy and the fluid moves from side to side.  Tests  If a bone spur or arthritis is suspected, X-rays may be needed. Fluid removal (needle aspiration) may be done. It also helps to decrease pain. To confirm a ganglion, MRI may be done. This reveals images of soft tissue and bone. Sometimes, special dyes may be injected into the area to show the outline.  How are ganglions treated?  Ganglions may be hard to treat without surgery. But nonsurgical methods may be helpful in relieving some of your symptoms.  Nonsurgical care    Pads placed around the ganglion can ease pressure and friction.    Fluid removal may also relieve symptoms. This is done with a needle. A steroid may be injected at the same time. But ganglions may come back.    Limiting movements or activities that increase pain may bring relief.    Icing the ganglion for 15 to 20 minutes may relieve inflammation and pain for a short time.    If inflammation is severe, your healthcare provider may treat your symptoms with medicine.  Surgical treatment  Surgery may be needed if a ganglion is causing ongoing or severe pain. The entire ganglion wall is removed during the procedure. Some nearby tissue may also be removed.  After surgery  You may feel pain, swelling, numbness, or tingling for several weeks following surgery. You should be able to walk soon afterward. But your foot may need to be wrapped or in a cast, boot, or hard shoe. See your healthcare provider if you  notice any future problems. Surgery is often successful. But there is a chance that the ganglion will come back.  Date Last Reviewed: 5/1/2018 2000-2018 [employer]. [address] [place] [address], [place] [address]. All rights reserved. This information is not intended as a substitute for professional medical care. Always follow your healthcare professional's instructions.

## 2019-12-31 NOTE — PROGRESS NOTES
"Subjective     Cher Ibarra is a 19 year old female who presents to clinic today for the following health issues:    HPI   Chief Complaint   Patient presents with     Mass     Cyst on left wrist and right ankle     Cher Ibarra is a 19 year old female with Down's who presents with her parents for chronic tender lumps on her right ankle and left wrist. Symptom onset has been gradual, worsening for a time period of years. Severity is described as moderate and localized. Course of her symptoms over time is worsening.    History difficult to obtain due to patient's mental status.    Reviewed and updated as needed this visit by Provider  Tobacco  Allergies  Meds  Problems  Med Hx  Surg Hx  Fam Hx         Review of Systems   ROS COMP: CONSTITUTIONAL: NEGATIVE for fever, chills, change in weight  INTEGUMENTARY/SKIN: NEGATIVE for worrisome rashes, moles or lesions  NEURO: developmental delay due to Down's       Objective    BP 99/66   Pulse 73   Temp 96.5  F (35.8  C) (Oral)   Resp 18   Ht 1.473 m (4' 10\")   Wt 73 kg (161 lb)   SpO2 97%   Breastfeeding No   BMI 33.65 kg/m    Body mass index is 33.65 kg/m .  Physical Exam   GENERAL: alert and no distress  MS: 1 cm symmetric soft mobile masses on left ventral wrist and right lateral ankle   SKIN: no suspicious lesions or rashes  NEURO: abnormal mental status - intellectual handicap   PSYCH: affect flat    Diagnostic Test Results:  Labs reviewed in Epic  No results found for any visits on 12/31/19.        Assessment & Plan     (M67.432) Ganglion cyst of wrist, left  (primary encounter diagnosis)  (M67.471) Ganglion cyst of right foot  Plan: ORTHO  REFERRAL        Any treatment is optional. The patient is reassured that these symptoms do not appear to represent a serious or threatening condition.     (Q90.9) Down's syndrome  Plan: any procedure will require anesthesia for this patient to tolerate            Return in about 9 months (around " 9/19/2020) for physical.    Paloma Diaz MD  [facility]

## 2020-01-06 ENCOUNTER — OFFICE VISIT (OUTPATIENT)
Dept: ORTHOPEDICS | Facility: CLINIC | Age: 20
End: 2020-01-06
Payer: COMMERCIAL

## 2020-01-06 VITALS
SYSTOLIC BLOOD PRESSURE: 100 MMHG | HEART RATE: 72 BPM | OXYGEN SATURATION: 99 % | BODY MASS INDEX: 32.25 KG/M2 | WEIGHT: 160 LBS | HEIGHT: 59 IN | DIASTOLIC BLOOD PRESSURE: 66 MMHG

## 2020-01-06 DIAGNOSIS — M67.432 GANGLION CYST OF WRIST, LEFT: Primary | ICD-10-CM

## 2020-01-06 DIAGNOSIS — M71.379 SYNOVIAL CYST OF ANKLE AND FOOT REGION: ICD-10-CM

## 2020-01-06 PROCEDURE — 99243 OFF/OP CNSLTJ NEW/EST LOW 30: CPT | Performed by: ORTHOPAEDIC SURGERY

## 2020-01-06 ASSESSMENT — MIFFLIN-ST. JEOR: SCORE: 1396.39

## 2020-01-06 NOTE — LETTER
1/6/2020         RE: Cher Ibarra  [address] [facility] [address]        Dear Colleague,    Thank you for referring your patient, Cher Ibarra, to the [facility]. Please see a copy of my visit note below.    Cher Ibarra is a 19 year old female with Down syndrome who is seen in consultation at the request of Dr. Paloma Diaz for evaluation of a mass located on the left wrist and right ankle.  She does have Down's syndrome and is accompanied by her mother who helps with narration.   Lumps have been present for 1 year increasing in size over the last 3-4 months.  Mother reports that Valencia is constantly mentioning and bothered by the masses.  She seems somewhat obsessed by them.  She is constantly rubbing them and say that they hurt.  Symptoms include pain, tenderness and swelling.  Previous treatment:  OTC bracing.     Past Medical History:   Diagnosis Date     Anxiety      ASD (atrial septal defect) 1998    Repair of ASD/VSD     Chronic rhinitis      Down syndrome      Keratosis pilaris      Obesity 03/05/2018     Recurrent boils      Strabismus      Trichotillomania        Past Surgical History:   Procedure Laterality Date     MAMMOPLASTY REDUCTION       REPAIR ATRIAL SEPTAL DEFECT      age 1     REPAIR VENTRICULAR SEPTAL DEFECT      age 1     TONSILLECTOMY & ADENOIDECTOMY      AGE 3       Family History   Problem Relation Age of Onset     Cerebrovascular Disease Mother      Depression Mother      Anxiety Disorder Mother      Obesity Mother      Anesthesia Reaction Maternal Grandmother      C.A.D. No family hx of      Diabetes No family hx of      Hypertension No family hx of      Breast Cancer No family hx of      Prostate Cancer No family hx of      Asthma No family hx of        Social History     Socioeconomic History     Marital status: Single     Spouse name: Not on file     Number of children: 0     Years of education: Not on file     Highest education  level: Not on file   Occupational History     Employer: STUDENT   Social Needs     Financial resource strain: Not on file     Food insecurity:     Worry: Not on file     Inability: Not on file     Transportation needs:     Medical: Not on file     Non-medical: Not on file   Tobacco Use     Smoking status: Never Smoker     Smokeless tobacco: Never Used   Substance and Sexual Activity     Alcohol use: No     Drug use: No     Sexual activity: Never   Lifestyle     Physical activity:     Days per week: Not on file     Minutes per session: Not on file     Stress: Not on file   Relationships     Social connections:     Talks on phone: Not on file     Gets together: Not on file     Attends Confucianist service: Not on file     Active member of club or organization: Not on file     Attends meetings of clubs or organizations: Not on file     Relationship status: Not on file     Intimate partner violence:     Fear of current or ex partner: Not on file     Emotionally abused: Not on file     Physically abused: Not on file     Forced sexual activity: Not on file   Other Topics Concern     Parent/sibling w/ CABG, MI or angioplasty before 65F 55M? No   Social History Narrative     Not on file       Current Outpatient Medications   Medication Sig Dispense Refill     acetylcysteine (NAC) 500 MG CAPS capsule Take 1,000 mg by mouth 2 times daily        ammonium lactate (LAC-HYDRIN) 12 % cream Apply topically 2 times daily as needed for dry skin       cetirizine (ZYRTEC) 10 MG tablet Take 1 tablet (10 mg) by mouth every evening 90 tablet 1     desvenlafaxine (PRISTIQ) 50 MG 24 hr tablet Take 1 tablet (50 mg) by mouth daily 90 tablet 1     fluticasone (FLONASE) 50 MCG/ACT nasal spray Spray 1-2 sprays into both nostrils daily 16 mL 11     folic acid (FOLVITE) 1 MG tablet Take 1 tablet (1 mg) by mouth daily 90 tablet 3     levonorgestrel-ethinyl estradiol (SEASONALE) 0.15-0.03 MG tablet Take 1 tablet by mouth       LORazepam (ATIVAN) 0.5 MG  "tablet Take 1 tablet (0.5 mg) by mouth every 6 hours as needed for anxiety . No further refills until follow-up appointment 20 tablet 0     melatonin 5 MG tablet None Entered       mupirocin (BACTROBAN) 2 % external ointment APPLY EXTERNALLY TO THE AFFECTED AREA THREE TIMES DAILY AS NEEDED 22 g 0     naproxen (NAPROSYN) 500 MG tablet Take 1 tablet (500 mg) by mouth 2 times daily as needed 60 tablet 1     Omega-3 Fatty Acids (OMEGA-3 FISH OIL PO) Take 1 g by mouth daily       traZODone (DESYREL) 50 MG tablet Take 0.5 tablets (25 mg) by mouth At Bedtime 45 tablet 3     triamcinolone (ARISTOCORT HP) 0.5 % external cream Apply topically 2 times daily to affected area as directed 30 g 0     vitamin D3 (CHOLECALCIFEROL) 2000 units (50 mcg) tablet Take 1 tablet (2,000 Units) by mouth daily 90 tablet 3       Allergies   Allergen Reactions     Azithromycin Hives     Codeine      Hydrocodone      Oxycodone      Tramadol      Zithromax [Azithromycin Dihydrate]        REVIEW OF SYSTEMS:  CONSTITUTIONAL:  NEGATIVE for fever, chills, change in weight, not feeling tired  SKIN:  NEGATIVE for worrisome rashes, no skin lumps, no skin ulcers and no non-healing wounds  EYES:  NEGATIVE for vision changes or irritation.  ENT/MOUTH:  NEGATIVE.  No hearing loss, no hoarseness, no difficulty swallowing.  RESP:  NEGATIVE. No cough or shortness of breath.  BREAST:  NEGATIVE for masses, tenderness or discharge  CV:  NEGATIVE for chest pain, palpitations or peripheral edema  GI:  NEGATIVE for nausea, abdominal pain, heartburn, or change in bowel habits  :  Negative. No dysuria, no hematuria  MUSCULOSKELETAL:  See HPI above  NEURO:  NEGATIVE . No headaches, no dizziness,  no numbness  ENDOCRINE:  NEGATIVE for temperature intolerance, skin/hair changes  HEME/ALLERGY/IMMUNE:  NEGATIVE for bleeding problems  PSYCHIATRIC:  NEGATIVE. no anxiety, no depression.         OBJECTIVE:    Vitals: /66   Pulse 72   Ht 1.499 m (4' 11\")   Wt 72.6 kg " (160 lb)   SpO2 99%   BMI 32.32 kg/m     BMI= Body mass index is 32.32 kg/m .       Exam:  1 x 1.5 cms mass is seen located on the volar aspect of the left wrist..  Tender at cyst.  No warmth or erythema.  Full range of motion of hand.  Sensation, motor, and circulation intact.  She has good warmth of the hand.  Right ankle shows a cyst over the lateral aspect of the ankle and foot.  This is in the region of the peroneal tendons.  It actually is about 3 to 4 cm long and slightly curved.  It does not move with the tendons.    ASSESSMENT: 1. Left volar ganglion cyst of wrist.  2. Right ankle ganglion cyst possible of peroneal tendons.    PLAN: I explained that these are not dangerous.  They are not cancer and never will be.  They are just annoying.  They can bother her normal structures.  Mom feels that Valencia is significantly bothered by these and would like to have them removed.  Risks, benefits, potential complications and alternatives were discussed.  She wishes surgical excision.  This will be done with General anesthesia in same day surgery.  We will do both excisions the same day.         Again, thank you for allowing me to participate in the care of your patient.        Sincerely,        Rigo Dutta MD

## 2020-01-06 NOTE — PATIENT INSTRUCTIONS
Surgery:  Left volar ganglion cyst excision, right lateral ankle cyst excision.    Preop physical with primary physician is needed within 30 days of the surgery.  Nothing to eat or drink for 8 hours before surgery.  For same day surgery you need a ride.  Someone should stay with you for 12 hours afterward.  Stop blood thinners 5 days before surgery (aspirin, warfarin, anti-inflammatories).      Call [phone] to schedule your surgery.

## 2020-01-06 NOTE — PROGRESS NOTES
Cher Ibarra is a 19 year old female with Down syndrome who is seen in consultation at the request of Dr. Paloma Diaz for evaluation of a mass located on the left wrist and right ankle.  She does have Down's syndrome and is accompanied by her mother who helps with narration.   Lumps have been present for 1 year increasing in size over the last 3-4 months.  Mother reports that Valencia is constantly mentioning and bothered by the masses.  She seems somewhat obsessed by them.  She is constantly rubbing them and say that they hurt.  Symptoms include pain, tenderness and swelling.  Previous treatment:  OTC bracing.     Past Medical History:   Diagnosis Date     Anxiety      ASD (atrial septal defect) 1998    Repair of ASD/VSD     Chronic rhinitis      Down syndrome      Keratosis pilaris      Obesity 03/05/2018     Recurrent boils      Strabismus      Trichotillomania        Past Surgical History:   Procedure Laterality Date     MAMMOPLASTY REDUCTION       REPAIR ATRIAL SEPTAL DEFECT      age 1     REPAIR VENTRICULAR SEPTAL DEFECT      age 1     TONSILLECTOMY & ADENOIDECTOMY      AGE 3       Family History   Problem Relation Age of Onset     Cerebrovascular Disease Mother      Depression Mother      Anxiety Disorder Mother      Obesity Mother      Anesthesia Reaction Maternal Grandmother      C.A.D. No family hx of      Diabetes No family hx of      Hypertension No family hx of      Breast Cancer No family hx of      Prostate Cancer No family hx of      Asthma No family hx of        Social History     Socioeconomic History     Marital status: Single     Spouse name: Not on file     Number of children: 0     Years of education: Not on file     Highest education level: Not on file   Occupational History     Employer: STUDENT   Social Needs     Financial resource strain: Not on file     Food insecurity:     Worry: Not on file     Inability: Not on file     Transportation needs:     Medical: Not on file      Non-medical: Not on file   Tobacco Use     Smoking status: Never Smoker     Smokeless tobacco: Never Used   Substance and Sexual Activity     Alcohol use: No     Drug use: No     Sexual activity: Never   Lifestyle     Physical activity:     Days per week: Not on file     Minutes per session: Not on file     Stress: Not on file   Relationships     Social connections:     Talks on phone: Not on file     Gets together: Not on file     Attends Amish service: Not on file     Active member of club or organization: Not on file     Attends meetings of clubs or organizations: Not on file     Relationship status: Not on file     Intimate partner violence:     Fear of current or ex partner: Not on file     Emotionally abused: Not on file     Physically abused: Not on file     Forced sexual activity: Not on file   Other Topics Concern     Parent/sibling w/ CABG, MI or angioplasty before 65F 55M? No   Social History Narrative     Not on file       Current Outpatient Medications   Medication Sig Dispense Refill     acetylcysteine (NAC) 500 MG CAPS capsule Take 1,000 mg by mouth 2 times daily        ammonium lactate (LAC-HYDRIN) 12 % cream Apply topically 2 times daily as needed for dry skin       cetirizine (ZYRTEC) 10 MG tablet Take 1 tablet (10 mg) by mouth every evening 90 tablet 1     desvenlafaxine (PRISTIQ) 50 MG 24 hr tablet Take 1 tablet (50 mg) by mouth daily 90 tablet 1     fluticasone (FLONASE) 50 MCG/ACT nasal spray Spray 1-2 sprays into both nostrils daily 16 mL 11     folic acid (FOLVITE) 1 MG tablet Take 1 tablet (1 mg) by mouth daily 90 tablet 3     levonorgestrel-ethinyl estradiol (SEASONALE) 0.15-0.03 MG tablet Take 1 tablet by mouth       LORazepam (ATIVAN) 0.5 MG tablet Take 1 tablet (0.5 mg) by mouth every 6 hours as needed for anxiety . No further refills until follow-up appointment 20 tablet 0     melatonin 5 MG tablet None Entered       mupirocin (BACTROBAN) 2 % external ointment APPLY EXTERNALLY TO  "THE AFFECTED AREA THREE TIMES DAILY AS NEEDED 22 g 0     naproxen (NAPROSYN) 500 MG tablet Take 1 tablet (500 mg) by mouth 2 times daily as needed 60 tablet 1     Omega-3 Fatty Acids (OMEGA-3 FISH OIL PO) Take 1 g by mouth daily       traZODone (DESYREL) 50 MG tablet Take 0.5 tablets (25 mg) by mouth At Bedtime 45 tablet 3     triamcinolone (ARISTOCORT HP) 0.5 % external cream Apply topically 2 times daily to affected area as directed 30 g 0     vitamin D3 (CHOLECALCIFEROL) 2000 units (50 mcg) tablet Take 1 tablet (2,000 Units) by mouth daily 90 tablet 3       Allergies   Allergen Reactions     Azithromycin Hives     Codeine      Hydrocodone      Oxycodone      Tramadol      Zithromax [Azithromycin Dihydrate]        REVIEW OF SYSTEMS:  CONSTITUTIONAL:  NEGATIVE for fever, chills, change in weight, not feeling tired  SKIN:  NEGATIVE for worrisome rashes, no skin lumps, no skin ulcers and no non-healing wounds  EYES:  NEGATIVE for vision changes or irritation.  ENT/MOUTH:  NEGATIVE.  No hearing loss, no hoarseness, no difficulty swallowing.  RESP:  NEGATIVE. No cough or shortness of breath.  BREAST:  NEGATIVE for masses, tenderness or discharge  CV:  NEGATIVE for chest pain, palpitations or peripheral edema  GI:  NEGATIVE for nausea, abdominal pain, heartburn, or change in bowel habits  :  Negative. No dysuria, no hematuria  MUSCULOSKELETAL:  See HPI above  NEURO:  NEGATIVE . No headaches, no dizziness,  no numbness  ENDOCRINE:  NEGATIVE for temperature intolerance, skin/hair changes  HEME/ALLERGY/IMMUNE:  NEGATIVE for bleeding problems  PSYCHIATRIC:  NEGATIVE. no anxiety, no depression.         OBJECTIVE:    Vitals: /66   Pulse 72   Ht 1.499 m (4' 11\")   Wt 72.6 kg (160 lb)   SpO2 99%   BMI 32.32 kg/m    BMI= Body mass index is 32.32 kg/m .       Exam:  1 x 1.5 cms mass is seen located on the volar aspect of the left wrist..  Tender at cyst.  No warmth or erythema.  Full range of motion of " hand.  Sensation, motor, and circulation intact.  She has good warmth of the hand.  Right ankle shows a cyst over the lateral aspect of the ankle and foot.  This is in the region of the peroneal tendons.  It actually is about 3 to 4 cm long and slightly curved.  It does not move with the tendons.    ASSESSMENT: 1. Left volar ganglion cyst of wrist.  2. Right ankle ganglion cyst possible of peroneal tendons.    PLAN: I explained that these are not dangerous.  They are not cancer and never will be.  They are just annoying.  They can bother her normal structures.  Mom feels that Valencia is significantly bothered by these and would like to have them removed.  Risks, benefits, potential complications and alternatives were discussed.  She wishes surgical excision.  This will be done with General anesthesia in same day surgery.  We will do both excisions the same day.

## 2020-01-07 ENCOUNTER — TELEPHONE (OUTPATIENT)
Dept: ORTHOPEDICS | Facility: CLINIC | Age: 20
End: 2020-01-07

## 2020-01-07 ENCOUNTER — OFFICE VISIT (OUTPATIENT)
Dept: FAMILY MEDICINE | Facility: CLINIC | Age: 20
End: 2020-01-07
Payer: COMMERCIAL

## 2020-01-07 VITALS
SYSTOLIC BLOOD PRESSURE: 108 MMHG | OXYGEN SATURATION: 97 % | BODY MASS INDEX: 32.46 KG/M2 | WEIGHT: 161 LBS | DIASTOLIC BLOOD PRESSURE: 50 MMHG | HEART RATE: 92 BPM | TEMPERATURE: 97.4 F | HEIGHT: 59 IN

## 2020-01-07 DIAGNOSIS — M71.379 SYNOVIAL CYST OF ANKLE AND FOOT REGION: ICD-10-CM

## 2020-01-07 DIAGNOSIS — Z01.818 PREOP GENERAL PHYSICAL EXAM: Primary | ICD-10-CM

## 2020-01-07 DIAGNOSIS — Q90.9 DOWN'S SYNDROME: ICD-10-CM

## 2020-01-07 DIAGNOSIS — M67.432 GANGLION CYST OF WRIST, LEFT: ICD-10-CM

## 2020-01-07 PROCEDURE — 99214 OFFICE O/P EST MOD 30 MIN: CPT | Performed by: FAMILY MEDICINE

## 2020-01-07 ASSESSMENT — MIFFLIN-ST. JEOR: SCORE: 1392.98

## 2020-01-07 NOTE — TELEPHONE ENCOUNTER
Type of surgery: Left wrist volar ganglion cyst excision, right ankle lateral cyst excision CPT code 57109 for both  Ganglion cyst of wrist, left [M67.432]  - Primary       Synovial cyst of ankle and foot region [M71.379]       Location of surgery: [facility]  Date and time of surgery: 1-10-20  Surgeon: Dr. Dutta  Pre-Op Appt Date: 1-7-20  Post-Op Appt Date: TBD   Packet sent out: No  Pre-cert/Authorization completed: No prior auth per [employer].   Date: 01/07/2020    Sent to  and was received. 01/07/2020    Insurance is valid. 01/07/2020

## 2020-01-08 NOTE — PROGRESS NOTES
[facility]  [address] [place] [address]  [place] [address]  [phone]  Dept: [phone]    PRE-OP EVALUATION:  Today's date: 2020    Cher Ibarra (: 2000) presents for pre-operative evaluation assessment as requested by Dr. Dutta.  She requires evaluation and anesthesia risk assessment prior to undergoing surgery/procedure for treatment of Ganglion cysts.    Fax number for surgical facility:    Primary Physician: Paloma Diaz  Type of Anesthesia Anticipated: to be determined    Patient has a Health Care Directive or Living Will:  NO    Preop Questions 2020   Who is doing your surgery? Tra   What are you having done? Ganglion cysts   Date of Surgery/Procedure: Eulogio 10, 2020   Facility or Hospital where procedure/surgery will be performed: [facility]   1.  Do you have a history of Heart attack, stroke, stent, coronary bypass surgery, or other heart surgery? YES  - ASD/VSD repair   2.  Do you ever have any pain or discomfort in your chest? No   3.  Do you have a history of  Heart Failure? No   4.   Are you troubled by shortness of breath when:  walking on a level surface, or up a slight hill, or at night? No   5.  Do you currently have a cold, bronchitis or other respiratory infection? No   6.  Do you have a cough, shortness of breath, or wheezing? No   7.  Do you sometimes get pains in the calves of your legs when you walk? No   8. Do you or anyone in your family have previous history of blood clots? No   9.  Do you or does anyone in your family have a serious bleeding problem such as prolonged bleeding following surgeries or cuts? No   10. Have you ever had problems with anemia or been told to take iron pills? No   11. Have you had any abnormal blood loss such as black, tarry or bloody stools, or abnormal vaginal bleeding? No   12. Have you ever had a blood transfusion? YES - No complications   13. Have you or any of your relatives ever had  problems with anesthesia? YES - Maternal grandmother- Nausea    14. Do you have sleep apnea, excessive snoring or daytime drowsiness? No   15. Do you have any prosthetic heart valves? No   16. Do you have prosthetic joints? No   17. Is there any chance that you may be pregnant? No     HPI:   Cher Ibarra is a 19 year old female with Down's who presents with tender lumps on her wrist and ankle. Symptom onset has been gradual, worsening for a time period of years. Severity is described as moderate. Course of her symptoms over time is worsening. History difficult to obtain due to patient's mental status.     See problem list for active medical problems.  Problems all longstanding and stable, except as noted/documented.  See ROS for pertinent symptoms related to these conditions.      MEDICAL HISTORY:     Patient Active Problem List    Diagnosis Date Noted     Down's syndrome 11/22/2010     Priority: High     Followed by [facility] yearly       Ganglion cyst of wrist, left 01/06/2020     Priority: Medium     Synovial cyst of ankle and foot region 01/06/2020     Priority: Medium     Obesity 03/05/2018     Priority: Medium     Anxiety disorder 07/07/2015     Priority: Medium     Trichotillomania      Priority: Low     Recurrent boils 07/05/2017     Priority: Low     Dysmenorrhea 02/24/2014     Priority: Low     [facility] and started on naprosyn, much help.       Chronic rhinitis 07/02/2012     Priority: Low     Sleep disturbance 11/22/2010     Priority: Low      Past Medical History:   Diagnosis Date     Anxiety      ASD (atrial septal defect) 1998    Repair of ASD/VSD     Chronic rhinitis      Down syndrome      Keratosis pilaris      Obesity 03/05/2018     Recurrent boils      Strabismus      Trichotillomania      Past Surgical History:   Procedure Laterality Date     MAMMOPLASTY REDUCTION       REPAIR ATRIAL SEPTAL DEFECT      age 1     REPAIR  VENTRICULAR SEPTAL DEFECT      age 1     TONSILLECTOMY & ADENOIDECTOMY      AGE 3     Current Outpatient Medications   Medication Sig Dispense Refill     acetylcysteine (NAC) 500 MG CAPS capsule Take 1,000 mg by mouth 2 times daily        ammonium lactate (LAC-HYDRIN) 12 % cream Apply topically 2 times daily as needed for dry skin       cetirizine (ZYRTEC) 10 MG tablet Take 1 tablet (10 mg) by mouth every evening 90 tablet 1     desvenlafaxine (PRISTIQ) 50 MG 24 hr tablet Take 1 tablet (50 mg) by mouth daily 90 tablet 1     fluticasone (FLONASE) 50 MCG/ACT nasal spray Spray 1-2 sprays into both nostrils daily 16 mL 11     folic acid (FOLVITE) 1 MG tablet Take 1 tablet (1 mg) by mouth daily 90 tablet 3     levonorgestrel-ethinyl estradiol (SEASONALE) 0.15-0.03 MG tablet Take 1 tablet by mouth       LORazepam (ATIVAN) 0.5 MG tablet Take 1 tablet (0.5 mg) by mouth every 6 hours as needed for anxiety . No further refills until follow-up appointment 20 tablet 0     melatonin 5 MG tablet None Entered       mupirocin (BACTROBAN) 2 % external ointment APPLY EXTERNALLY TO THE AFFECTED AREA THREE TIMES DAILY AS NEEDED 22 g 0     naproxen (NAPROSYN) 500 MG tablet Take 1 tablet (500 mg) by mouth 2 times daily as needed 60 tablet 1     Omega-3 Fatty Acids (OMEGA-3 FISH OIL PO) Take 1 g by mouth daily       traZODone (DESYREL) 50 MG tablet Take 0.5 tablets (25 mg) by mouth At Bedtime 45 tablet 3     triamcinolone (ARISTOCORT HP) 0.5 % external cream Apply topically 2 times daily to affected area as directed 30 g 0     vitamin D3 (CHOLECALCIFEROL) 2000 units (50 mcg) tablet Take 1 tablet (2,000 Units) by mouth daily 90 tablet 3     OTC products: None, except as noted above    Allergies   Allergen Reactions     Azithromycin Hives     Codeine      Hydrocodone      Oxycodone      Tramadol      Zithromax [Azithromycin Dihydrate]       Latex Allergy: NO    Social History     Tobacco Use     Smoking status: Never Smoker     Smokeless  "tobacco: Never Used   Substance Use Topics     Alcohol use: No     History   Drug Use No       REVIEW OF SYSTEMS:   CONSTITUTIONAL: NEGATIVE for fever, chills, change in weight  INTEGUMENTARY/SKIN: NEGATIVE for worrisome rashes, moles or lesions  EYES: NEGATIVE for vision changes or irritation  ENT/MOUTH: NEGATIVE for ear, mouth and throat problems  RESP: NEGATIVE for significant cough or SOB  CV: NEGATIVE for chest pain, palpitations or peripheral edema  GI: NEGATIVE for nausea, abdominal pain, heartburn, or change in bowel habits  : NEGATIVE for frequency, dysuria, or hematuria  MUSCULOSKELETAL: see HPI   NEURO: PDD with Down's   ENDOCRINE: NEGATIVE for temperature intolerance, skin/hair changes  HEME: NEGATIVE for bleeding problems  PSYCHIATRIC: HX anxiety    EXAM:   /50   Pulse 92   Temp 97.4  F (36.3  C) (Oral)   Ht 1.486 m (4' 10.5\")   Wt 73 kg (161 lb)   SpO2 97%   Breastfeeding No   BMI 33.08 kg/m      GENERAL APPEARANCE: alert and no distress     EYES: EOMI, PERRL     HENT: ear canals and TM's normal and nose and mouth without ulcers or lesions     NECK: no adenopathy, no asymmetry, masses, or scars and thyroid normal to palpation     RESP: lungs clear to auscultation - no rales, rhonchi or wheezes     CV: regular rates and rhythm, normal S1 S2, no S3 or S4 and no murmur, click or rub     ABDOMEN:  soft, nontender, no HSM or masses and bowel sounds normal     MS: extremities normal- no gross deformities noted, no evidence of inflammation in joints, FROM in all extremities.     SKIN: no suspicious lesions or rashes     NEURO: Normal strength and tone, sensory exam grossly normal     PSYCH: affect normal/bright     LYMPHATICS: No cervical adenopathy    DIAGNOSTICS:   No labs or EKG required for low risk surgery (cataract, skin procedure, breast biopsy, etc)    Recent Labs   Lab Test 10/15/14      POTASSIUM 3.5   CR 1.02        IMPRESSION:   Reason for surgery/procedure: ganglion cysts "   Diagnosis/reason for consult: Down's     The proposed surgical procedure is considered INTERMEDIATE risk.    REVISED CARDIAC RISK INDEX  The patient has the following serious cardiovascular risks for perioperative complications such as (MI, PE, VFib and 3  AV Block):  No serious cardiac risks  INTERPRETATION: 0 risks: Class I (very low risk - 0.4% complication rate)    The patient has the following additional risks for perioperative complications:  No identified additional risks      ICD-10-CM    1. Preop general physical exam Z01.818    2. Ganglion cyst of wrist, left M67.432    3. Synovial cyst of ankle and foot region M71.379    4. Down's syndrome Q90.9        RECOMMENDATIONS:   --Patient is to take all scheduled medications on the day of surgery EXCEPT for modifications listed below.    Anticoagulant or Antiplatelet Medication Use  NSAIDS: Naproxen (Naprosyn):   Stop 2-3 days prior to surgery        APPROVAL GIVEN to proceed with proposed procedure, without further diagnostic evaluation       Signed Electronically by: Paloma Diaz MD    Copy of this evaluation report is provided to requesting physician.    [facility] Preop Guidelines    Revised Cardiac Risk Index

## 2020-01-20 ENCOUNTER — OFFICE VISIT (OUTPATIENT)
Dept: ORTHOPEDICS | Facility: CLINIC | Age: 20
End: 2020-01-20
Payer: COMMERCIAL

## 2020-01-20 VITALS
BODY MASS INDEX: 32.25 KG/M2 | SYSTOLIC BLOOD PRESSURE: 104 MMHG | DIASTOLIC BLOOD PRESSURE: 63 MMHG | RESPIRATION RATE: 16 BRPM | WEIGHT: 160 LBS | HEIGHT: 59 IN | HEART RATE: 96 BPM

## 2020-01-20 DIAGNOSIS — M71.379 SYNOVIAL CYST OF ANKLE AND FOOT REGION: ICD-10-CM

## 2020-01-20 DIAGNOSIS — M67.432 GANGLION CYST OF WRIST, LEFT: Primary | ICD-10-CM

## 2020-01-20 PROCEDURE — 99024 POSTOP FOLLOW-UP VISIT: CPT | Performed by: ORTHOPAEDIC SURGERY

## 2020-01-20 ASSESSMENT — MIFFLIN-ST. JEOR: SCORE: 1388.45

## 2020-01-20 NOTE — LETTER
1/20/2020         RE: Cher Ibarra  [address] [facility] [address]        Dear Colleague,    Thank you for referring your patient, Cher Iabrra, to the [facility]. Please see a copy of my visit note below.    Follow up cyst excision from left wrist and right ankle.  These were ganglion cysts.  Wound is healing well.  No removable sutures present.  Will start scar massage.  Resume activity as tolerated.    Again, thank you for allowing me to participate in the care of your patient.        Sincerely,        Rigo Dutta MD

## 2020-01-22 NOTE — PROGRESS NOTES
Follow up cyst excision from left wrist and right ankle.  These were ganglion cysts.  Wound is healing well.  No removable sutures present.  Will start scar massage.  Resume activity as tolerated.

## 2020-01-28 DIAGNOSIS — F41.1 GENERALIZED ANXIETY DISORDER: ICD-10-CM

## 2020-01-28 NOTE — TELEPHONE ENCOUNTER
Duplicate     Disp Refills Start End PAOLA   desvenlafaxine (PRISTIQ) 50 MG 24 hr tablet 90 tablet 1 8/13/2019  --   Sig - Route: Take 1 tablet (50 mg) by mouth daily - Oral   Sent to pharmacy as: desvenlafaxine (PRISTIQ) 50 MG 24 hr tablet   Class: E-Prescribe   Order: [id number]   E-Prescribing Status: Receipt confirmed by pharmacy (8/13/2019 11:21 AM CDT)

## 2020-01-30 RX ORDER — DESVENLAFAXINE 50 MG/1
TABLET, FILM COATED, EXTENDED RELEASE ORAL
Qty: 90 TABLET | Refills: 2 | Status: SHIPPED | OUTPATIENT
Start: 2020-01-30 | End: 2020-11-02

## 2020-01-30 NOTE — TELEPHONE ENCOUNTER
"Routing refill request to provider for review/approval because:  Labs not current:  Cr    Requested Prescriptions   Pending Prescriptions Disp Refills     desvenlafaxine (PRISTIQ) 50 MG 24 hr tablet [Pharmacy Med Name: DESVENLAFAXINE ER SUCCINATE 50MG T] 90 tablet 1     Sig: TAKE 1 TABLET(50 MG) BY MOUTH DAILY       Serotonin-Norepinephrine Reuptake Inhibitors  Failed - 1/28/2020  9:20 AM        Failed - Normal serum creatinine on file in past 12 months     Recent Labs   Lab Test 10/15/14   CR 1.02             Passed - Blood pressure under 140/90 in past 12 months     BP Readings from Last 3 Encounters:   01/20/20 104/63   01/07/20 108/50   01/06/20 100/66                 Passed - Recent (12 mo) or future (30 days) visit within the authorizing provider's specialty     Patient has had an office visit with the authorizing provider or a provider within the authorizing providers department within the previous 12 mos or has a future within next 30 days. See \"Patient Info\" tab in inbasket, or \"Choose Columns\" in Meds & Orders section of the refill encounter.              Passed - Medication is active on med list        Passed - Patient is age 18 or older        Passed - No active pregnancy on record        Passed - No positive pregnancy test in past 12 months        Debbi Burger RN  "

## 2020-02-11 ENCOUNTER — MYC MEDICAL ADVICE (OUTPATIENT)
Dept: FAMILY MEDICINE | Facility: CLINIC | Age: 20
End: 2020-02-11

## 2020-02-11 RX ORDER — LEVONORGESTREL AND ETHINYL ESTRADIOL 0.15-0.03
1 KIT ORAL DAILY
Start: 2020-02-11

## 2020-02-11 NOTE — TELEPHONE ENCOUNTER
Routing refill request to provider for review/approval because:  Medication is reported/historical- see Nefsis message.   Virginia Butcher RN

## 2020-02-11 NOTE — TELEPHONE ENCOUNTER
Refused Prescriptions:                       Disp   Refills    levonorgestrel-ethinyl estradiol (SEASONAL*                Sig: Take 1 tablet by mouth daily  Refused By: VERENA LEE  Reason for Refusal: Originating/Specialty Provider to approve  Reason for Refusal Comment: Dr. Inés Terrazas

## 2020-02-14 NOTE — TELEPHONE ENCOUNTER
Per 2/11/2020 refill encounter, refill refused per provider. Spoke to pharmacy and informed them as well.  Carmen JIMENEZ CMA ([facility])

## 2020-02-15 ENCOUNTER — MYC MEDICAL ADVICE (OUTPATIENT)
Dept: FAMILY MEDICINE | Facility: CLINIC | Age: 20
End: 2020-02-15

## 2020-02-15 DIAGNOSIS — Z30.9 ENCOUNTER FOR CONTRACEPTIVE MANAGEMENT, UNSPECIFIED TYPE: Primary | ICD-10-CM

## 2020-02-17 RX ORDER — LEVONORGESTREL AND ETHINYL ESTRADIOL 0.15-0.03
1 KIT ORAL DAILY
Qty: 30 TABLET | Refills: 0 | Status: SHIPPED | OUTPATIENT
Start: 2020-02-17 | End: 2020-03-12

## 2020-02-17 RX ORDER — LEVONORGESTREL AND ETHINYL ESTRADIOL 0.15-0.03
KIT ORAL
Qty: 91 TABLET | OUTPATIENT
Start: 2020-02-17

## 2020-02-17 NOTE — TELEPHONE ENCOUNTER
Replied via Ace[employer]. Please refer to 2/11 refill request.    Carmen Bueno RN  [facility]

## 2020-02-17 NOTE — TELEPHONE ENCOUNTER
I have sent a 30 day supply to her pharmacy. Please have her schedule an appointment with Dr. Diaz to address future refills. Thanks!  Isha Singh, APRN, CNP

## 2020-03-12 ENCOUNTER — MYC MEDICAL ADVICE (OUTPATIENT)
Dept: FAMILY MEDICINE | Facility: CLINIC | Age: 20
End: 2020-03-12

## 2020-03-12 DIAGNOSIS — Z30.9 ENCOUNTER FOR CONTRACEPTIVE MANAGEMENT, UNSPECIFIED TYPE: ICD-10-CM

## 2020-03-12 RX ORDER — LEVONORGESTREL AND ETHINYL ESTRADIOL 0.15-0.03
1 KIT ORAL DAILY
Qty: 90 TABLET | Refills: 0 | Status: SHIPPED | OUTPATIENT
Start: 2020-03-12 | End: 2020-04-24

## 2020-03-12 NOTE — TELEPHONE ENCOUNTER
Ok ot renew prescription for 3 months and can see Primary Care Provider at that time.  Margaret Covarrubias, CNP

## 2020-03-19 ENCOUNTER — VIRTUAL VISIT (OUTPATIENT)
Dept: FAMILY MEDICINE | Facility: OTHER | Age: 20
End: 2020-03-19

## 2020-03-19 ENCOUNTER — MYC MEDICAL ADVICE (OUTPATIENT)
Dept: FAMILY MEDICINE | Facility: CLINIC | Age: 20
End: 2020-03-19

## 2020-03-19 NOTE — PROGRESS NOTES
"Date: 2020 10:40:32  Clinician: Marika Holguin  Clinician NPI: [id number]  Patient: Cher Ibarra  Patient : 2000  Patient Address: [address] [place] [id number]  Patient Phone: [phone]  Visit Protocol: URI  Patient Summary:  Cher is a 19 year old ( : 2000 ) female who initiated a Visit for COVID-19 (Coronavirus) evaluation and screening. When asked the question \"Please sign me up to receive news, health information and promotions. \", Cher responded \"Yes\".    Cher states her symptoms started gradually 3-6 days ago.   Her symptoms consist of a sore throat, a cough, nasal congestion, and rhinitis.   Symptom details     Nasal secretions: The color of her mucus is clear and white.    Cough: Cher coughs every 5-10 minutes and her cough is not more bothersome at night. Phlegm comes into her throat when she coughs. She believes her cough is caused by post-nasal drip. The color of the phlegm is white and clear.     Sore throat: Cher reports having mild throat pain (1-3 on a 10 point pain scale), does not have exudate on her tonsils, and can swallow liquids. The lymph nodes in her neck are not enlarged. A rash has not appeared on the skin since the sore throat started.      Cher denies having malaise, headache, fever, ear pain, enlarged lymph nodes, facial pain or pressure, myalgias, chills, teeth pain, and wheezing. She also denies having recent facial or sinus surgery in the past 60 days and double sickening (worsening symptoms after initial improvement). She is not experiencing dyspnea.   Precipitating events  Cher is not sure if she has been exposed to someone with strep throat. She has not recently been exposed to someone with influenza. Cher has not been in close contact with any high risk individuals.   Pertinent COVID-19 (Coronavirus) information  Cher has not traveled internationally or to the areas where COVID-19 (Coronavirus) is widespread, " including cruise ship travel in the last 14 days before the start of her symptoms.   Cher has not had a close contact with a laboratory-confirmed COVID-19 patient within 14 days of symptom onset. She also has not had a close contact with a suspected COVID-19 patient within 14 days of symptom onset.   Cher is not a healthcare worker and does not work in a healthcare facility.   Pertinent medical history  Cher has taken an antibiotic medication in the past month. Antibiotic details as reported by the patient (free text): She takes 100mg of Doxycycline Hyclate once a day for her hidradenitis suppurativa.   Cher does not get yeast infections when she takes antibiotics.   Cher does not need a return to work/school note.   Weight: 160 lbs   Cher does not smoke or use smokeless tobacco.   She denies pregnancy and denies breastfeeding. She does not menstruate.   Additional information as reported by the patient (free text): She has Down Syndrome   Weight: 160 lbs    MEDICATIONS: loratadine oral, melatonin oral, trazodone oral, Introvale oral, desvenlafaxine oral, fluticasone propionate nasal, doxycycline hyclate oral, ALLERGIES: Zithromax  Clinician Response:  Dear Cher,   Based on the information you have provided, you do have symptoms that are consistent with Coronavirus (COVID-19).  The coronavirus causes mild to severe respiratory illness with the most common symptoms including fever, cough and difficulty breathing. Unfortunately, many viruses cause similar symptoms and it can be difficult to distinguish between viruses, especially in mild cases, so we are presuming that anyone with cough or fever has coronavirus at this time.  Coronavirus/COVID-19 has reached the point of community spread in [place], meaning that we are finding the virus in people with no known exposure risk for doroteo the virus. Given the increasing commonness of coronavirus in the community we are no longer testing patients who  are not critically ill.  If you are a health care worker, you should refer to your employee health office for instructions about returning to work.  For everyone else who has cough or fever, you should assume you are infected with coronavirus. Accordingly, you should self-quarantine for seven days from the first day your symptoms started OR 72 hours after your cough and fever completely resolve - WHICHEVER is LONGER. You should call if you find increasing shortness of breath, wheezing or sustained fever above 101.5. If you are significantly short of breath or experience chest pain you should call 911 or report to the nearest emergency department for urgent evaluation.    Isolate yourself at home.   Do Not allow any visitors  Do Not go to work or school  Do Not go to [employer],  centers, shopping, or other public places.  Do Not shake hands.  Avoid close contact with others (hugging, kissing).   Protect Others:    Cover Your Mouth and Nose with a mask, disposable tissue or wash cloth to avoid spreading germs to others.  Wash your hands and face frequently with soap and water.   If you develop significant shortness of breath that prevents you from doing normal activities, please call 911 or proceed to the nearest emergency room and alert them immediately that you have been in self-isolation for possible coronavirus.   For more information about COVID19 and options for caring for yourself at home, please visit the CDC website at https://www.cdc.gov/coronavirus/2019-ncov/about/steps-when-sick.htmlFor more options for care at [facility], please visit our website at [email or web address]     Diagnosis: Cough  Diagnosis ICD: R05

## 2020-04-22 ENCOUNTER — MYC MEDICAL ADVICE (OUTPATIENT)
Dept: FAMILY MEDICINE | Facility: CLINIC | Age: 20
End: 2020-04-22

## 2020-04-24 ENCOUNTER — VIRTUAL VISIT (OUTPATIENT)
Dept: FAMILY MEDICINE | Facility: CLINIC | Age: 20
End: 2020-04-24
Payer: COMMERCIAL

## 2020-04-24 DIAGNOSIS — F63.3 TRICHOTILLOMANIA: Primary | ICD-10-CM

## 2020-04-24 DIAGNOSIS — F41.1 GENERALIZED ANXIETY DISORDER: ICD-10-CM

## 2020-04-24 DIAGNOSIS — Q90.9 DOWN'S SYNDROME: ICD-10-CM

## 2020-04-24 DIAGNOSIS — Z30.9 ENCOUNTER FOR CONTRACEPTIVE MANAGEMENT, UNSPECIFIED TYPE: ICD-10-CM

## 2020-04-24 PROCEDURE — 99441 ZZC PHYSICIAN TELEPHONE EVALUATION 5-10 MIN: CPT | Performed by: NURSE PRACTITIONER

## 2020-04-24 RX ORDER — LEVONORGESTREL AND ETHINYL ESTRADIOL 0.15-0.03
1 KIT ORAL DAILY
Qty: 90 TABLET | Refills: 0 | Status: SHIPPED | OUTPATIENT
Start: 2020-04-24 | End: 2020-08-05

## 2020-04-24 RX ORDER — TRAZODONE HYDROCHLORIDE 50 MG/1
25 TABLET, FILM COATED ORAL AT BEDTIME
Qty: 45 TABLET | Refills: 3 | Status: SHIPPED | OUTPATIENT
Start: 2020-04-24 | End: 2022-06-14

## 2020-04-24 ASSESSMENT — ANXIETY QUESTIONNAIRES
6. BECOMING EASILY ANNOYED OR IRRITABLE: NOT AT ALL
GAD7 TOTAL SCORE: 4
1. FEELING NERVOUS, ANXIOUS, OR ON EDGE: SEVERAL DAYS
3. WORRYING TOO MUCH ABOUT DIFFERENT THINGS: SEVERAL DAYS
5. BEING SO RESTLESS THAT IT IS HARD TO SIT STILL: NOT AT ALL
2. NOT BEING ABLE TO STOP OR CONTROL WORRYING: SEVERAL DAYS
IF YOU CHECKED OFF ANY PROBLEMS ON THIS QUESTIONNAIRE, HOW DIFFICULT HAVE THESE PROBLEMS MADE IT FOR YOU TO DO YOUR WORK, TAKE CARE OF THINGS AT HOME, OR GET ALONG WITH OTHER PEOPLE: SOMEWHAT DIFFICULT
7. FEELING AFRAID AS IF SOMETHING AWFUL MIGHT HAPPEN: NOT AT ALL

## 2020-04-24 ASSESSMENT — PATIENT HEALTH QUESTIONNAIRE - PHQ9
5. POOR APPETITE OR OVEREATING: SEVERAL DAYS
SUM OF ALL RESPONSES TO PHQ QUESTIONS 1-9: 5

## 2020-04-24 NOTE — PATIENT INSTRUCTIONS
Mental health will contact you to schedule appointment. Please let me know if you do not hear from them within a week.

## 2020-04-24 NOTE — PROGRESS NOTES
"Cher Ibarra is a 19 year old female who is being evaluated via a billable telephone visit.      The patient has been notified of following:     \"This telephone visit will be conducted via a call between you and your physician/provider. We have found that certain health care needs can be provided without the need for a physical exam.  This service lets us provide the care you need with a short phone conversation.  If a prescription is necessary we can send it directly to your pharmacy.  If lab work is needed we can place an order for that and you can then stop by our lab to have the test done at a later time.    Telephone visits are billed at different rates depending on your insurance coverage. During this emergency period, for some insurers they may be billed the same as an in-person visit.  Please reach out to your insurance provider with any questions.    If during the course of the call the physician/provider feels a telephone visit is not appropriate, you will not be charged for this service.\"    Patient has given verbal consent for Telephone visit?  Yes    How would you like to obtain your AVS? MyChart    Subjective     Cher Ibarra is a 19 year old female who presents to clinic today for the following health issues:    HPI History is given by mother (Safia)  Anxiety Follow-Up    How are you doing with your anxiety since your last visit? Worsened     Are you having other symptoms that might be associated with anxiety? Yes:  trichotillomania    Have you had a significant life event? OTHER: global pandemic      Are you feeling depressed? No    Do you have any concerns with your use of alcohol or other drugs? No     Medical therapies for the past 4 years are: Pristiq which is helpful for anxiety and trichotillomania; NAC for trichotillomania; trazodone which is helpful for sleep; and lorazepam for situational anxiety which she takes rarely ie before flying. Mom states that she had a test at [facility] in " "2016 which showed that the Pristiq may be the most helpful for her. Prior to that she was taking Prozac which was not helpful. Mom states that the Pristiq has been the most helpful and stable for the patient and that she does not do well with medication changes. Mom states that her schedule is \"off\" with the COVID-19 pandemic and social isolating which has increased her anxiety and trichotillomania. Mom noted large bald spots after washing patient's hair.  They do not have a counselor that they are seeing but that they would be interested in a mental health referral.     Social History     Tobacco Use     Smoking status: Never Smoker     Smokeless tobacco: Never Used   Substance Use Topics     Alcohol use: No     Drug use: No     GILDARDO-7 SCORE 7/7/2015   Total Score 12     No flowsheet data found.  Last PHQ-9 4/24/2020   1.  Little interest or pleasure in doing things 1   2.  Feeling down, depressed, or hopeless 1   3.  Trouble falling or staying asleep, or sleeping too much 0   4.  Feeling tired or having little energy 1   5.  Poor appetite or overeating 0   6.  Feeling bad about yourself 0   7.  Trouble concentrating 1   8.  Moving slowly or restless 1   Q9: Thoughts of better off dead/self-harm past 2 weeks 0   PHQ-9 Total Score 5   Difficulty at work, home, or with people Somewhat difficult     GILDARDO-7  4/24/2020   1. Feeling nervous, anxious, or on edge 1   2. Not being able to stop or control worrying 1   3. Worrying too much about different things 1   4. Trouble relaxing 1   5. Being so restless that it is hard to sit still 0   6. Becoming easily annoyed or irritable 0   7. Feeling afraid, as if something awful might happen 0   GILDARDO-7 Total Score 4   If you checked any problems, how difficult have they made it for you to do your work, take care of things at home, or get along with other people? Somewhat difficult       How many servings of fruits and vegetables do you eat daily?  0-1    On average, how many " sweetened beverages do you drink each day (Examples: soda, juice, sweet tea, etc.  Do NOT count diet or artificially sweetened beverages)?   1    How many days per week do you exercise enough to make your heart beat faster? 3 or less    How many minutes a day do you exercise enough to make your heart beat faster? 30 - 60    How many days per week do you miss taking your medication? 0    Has been on Seasonale for dysmenorrhea and also for control of menstrual cycles as she will get flares of hyperhidrosis which are difficult to control with her cycles.  Denies vision changes. Blood pressures have been normal.     Patient Active Problem List   Diagnosis     Down's syndrome     Sleep disturbance     Chronic rhinitis     Dysmenorrhea     Anxiety disorder     Recurrent boils     Obesity     Trichotillomania     Ganglion cyst of wrist, left     Synovial cyst of ankle and foot region     Past Surgical History:   Procedure Laterality Date     AS EXCIS PRIMARY GANGLION WRIST  01/2020    wrist and ankle     MAMMOPLASTY REDUCTION       REPAIR ATRIAL SEPTAL DEFECT      age 1     REPAIR VENTRICULAR SEPTAL DEFECT      age 1     TONSILLECTOMY & ADENOIDECTOMY      AGE 3       Social History     Tobacco Use     Smoking status: Never Smoker     Smokeless tobacco: Never Used   Substance Use Topics     Alcohol use: No     Family History   Problem Relation Age of Onset     Cerebrovascular Disease Mother      Depression Mother      Anxiety Disorder Mother      Obesity Mother      Anesthesia Reaction Maternal Grandmother      C.A.D. No family hx of      Diabetes No family hx of      Hypertension No family hx of      Breast Cancer No family hx of      Prostate Cancer No family hx of      Asthma No family hx of          Current Outpatient Medications   Medication Sig Dispense Refill     acetylcysteine (NAC) 500 MG CAPS capsule Take 1,000 mg by mouth 2 times daily        ammonium lactate (LAC-HYDRIN) 12 % cream Apply topically 2 times daily  as needed for dry skin       cetirizine (ZYRTEC) 10 MG tablet Take 1 tablet (10 mg) by mouth every evening 90 tablet 1     desvenlafaxine (PRISTIQ) 50 MG 24 hr tablet TAKE 1 TABLET(50 MG) BY MOUTH DAILY 90 tablet 2     fluticasone (FLONASE) 50 MCG/ACT nasal spray Spray 1-2 sprays into both nostrils daily 16 mL 11     folic acid (FOLVITE) 1 MG tablet Take 1 tablet (1 mg) by mouth daily 90 tablet 3     levonorgestrel-ethinyl estradiol (SEASONALE) 0.15-0.03 MG tablet Take 1 tablet by mouth daily 90 tablet 0     LORazepam (ATIVAN) 0.5 MG tablet Take 1 tablet (0.5 mg) by mouth every 6 hours as needed for anxiety . No further refills until follow-up appointment 20 tablet 0     melatonin 5 MG tablet None Entered       mupirocin (BACTROBAN) 2 % external ointment APPLY EXTERNALLY TO THE AFFECTED AREA THREE TIMES DAILY AS NEEDED 22 g 0     naproxen (NAPROSYN) 500 MG tablet Take 1 tablet (500 mg) by mouth 2 times daily as needed 60 tablet 1     Omega-3 Fatty Acids (OMEGA-3 FISH OIL PO) Take 1 g by mouth daily       traZODone (DESYREL) 50 MG tablet Take 0.5 tablets (25 mg) by mouth At Bedtime 45 tablet 3     triamcinolone (ARISTOCORT HP) 0.5 % external cream Apply topically 2 times daily to affected area as directed 30 g 0     vitamin D3 (CHOLECALCIFEROL) 2000 units (50 mcg) tablet Take 1 tablet (2,000 Units) by mouth daily 90 tablet 3     Allergies   Allergen Reactions     Azithromycin Hives     Codeine      Hydrocodone      Oxycodone      Tramadol      Zithromax [Azithromycin Dihydrate]      BP Readings from Last 3 Encounters:   01/20/20 104/63   01/07/20 108/50   01/06/20 100/66    Wt Readings from Last 3 Encounters:   01/20/20 72.6 kg (160 lb)   01/07/20 73 kg (161 lb)   01/06/20 72.6 kg (160 lb)                    Reviewed and updated as needed this visit by Provider  Tobacco  Allergies  Meds  Problems  Med Hx  Surg Hx  Fam Hx         Review of Systems   ROS COMP: Constitutional, HEENT, cardiovascular, pulmonary, gi  and gu systems are negative, except as otherwise noted.       Objective   Reported vitals:  There were no vitals taken for this visit.       Diagnostic Test Results:  Labs reviewed in Epic          Assessment/Plan:  1. Trichotillomania  - MENTAL HEALTH REFERRAL  - Adult; Psychiatry; Psychiatry; G: [facility] Psychiatry Service/Bridge to Long-Term Psychiatry as indicated ([phone]); Yes; Other: Enter in Comments box; Yes; We will contact you to schedule the appointme...    2. Generalized anxiety disorder  - traZODone (DESYREL) 50 MG tablet; Take 0.5 tablets (25 mg) by mouth At Bedtime  Dispense: 45 tablet; Refill: 3  - MENTAL HEALTH REFERRAL  - Adult; Psychiatry; Psychiatry; G: [facility] Psychiatry Service/[facility] to Long-Term Psychiatry as indicated ([phone]); Yes; Other: Enter in Comments box; Yes; We will contact you to schedule the appointme...    3. Down's syndrome  - MENTAL HEALTH REFERRAL  - Adult; Psychiatry; Psychiatry; G: [facility] Psychiatry Service/Bridge to Long-Term Psychiatry as indicated ([phone]); Yes; Other: Enter in Comments box; Yes; We will contact you to schedule the appointme...    4. Encounter for contraceptive management, unspecified type  - levonorgestrel-ethinyl estradiol (SEASONALE) 0.15-0.03 MG tablet; Take 1 tablet by mouth daily  Dispense: 90 tablet; Refill: 0    Return in about 5 months (around 9/24/2020), or or sooner if symptoms worsen or fail to improve, for Physical.    Phone call duration:  9 minutes    AUBREY Hurtado CNP

## 2020-04-25 ASSESSMENT — ANXIETY QUESTIONNAIRES: GAD7 TOTAL SCORE: 4

## 2020-06-12 NOTE — TELEPHONE ENCOUNTER
[facility] called and is requesting order for flu vaccine. Please place order. Marika Altamirano MA     Negative

## 2020-06-15 ENCOUNTER — MYC MEDICAL ADVICE (OUTPATIENT)
Dept: FAMILY MEDICINE | Facility: CLINIC | Age: 20
End: 2020-06-15

## 2020-06-15 DIAGNOSIS — R21 SKIN RASH: ICD-10-CM

## 2020-06-15 RX ORDER — TRIAMCINOLONE ACETONIDE 5 MG/G
CREAM TOPICAL
Qty: 30 G | Refills: 0 | Status: SHIPPED | OUTPATIENT
Start: 2020-06-15

## 2020-06-15 NOTE — TELEPHONE ENCOUNTER
Routing refill request to provider for review/approval because:  Dx? Prescribed for skin rash 11/2019

## 2020-06-16 ENCOUNTER — MYC MEDICAL ADVICE (OUTPATIENT)
Dept: FAMILY MEDICINE | Facility: CLINIC | Age: 20
End: 2020-06-16

## 2020-08-05 DIAGNOSIS — Z30.9 ENCOUNTER FOR CONTRACEPTIVE MANAGEMENT, UNSPECIFIED TYPE: ICD-10-CM

## 2020-08-05 DIAGNOSIS — Q90.9 DOWN'S SYNDROME: ICD-10-CM

## 2020-08-05 RX ORDER — LEVONORGESTREL AND ETHINYL ESTRADIOL 0.15-0.03
KIT ORAL
Qty: 91 TABLET | Refills: 2 | Status: SHIPPED | OUTPATIENT
Start: 2020-08-05 | End: 2021-04-07

## 2020-08-05 RX ORDER — FOLIC ACID 1 MG/1
TABLET ORAL
Qty: 90 TABLET | Refills: 1 | Status: SHIPPED | OUTPATIENT
Start: 2020-08-05 | End: 2021-02-01

## 2020-09-01 DIAGNOSIS — Q90.9 DOWN'S SYNDROME: ICD-10-CM

## 2020-09-01 RX ORDER — CHOLECALCIFEROL (VITAMIN D3) 50 MCG
TABLET ORAL
Qty: 90 TABLET | Refills: 1 | Status: SHIPPED | OUTPATIENT
Start: 2020-09-01 | End: 2021-03-03

## 2020-09-23 ENCOUNTER — MYC MEDICAL ADVICE (OUTPATIENT)
Dept: FAMILY MEDICINE | Facility: CLINIC | Age: 20
End: 2020-09-23

## 2020-09-23 DIAGNOSIS — K21.9 GASTROESOPHAGEAL REFLUX DISEASE WITHOUT ESOPHAGITIS: ICD-10-CM

## 2020-09-23 DIAGNOSIS — F63.3 TRICHOTILLOMANIA: Primary | ICD-10-CM

## 2020-09-23 NOTE — TELEPHONE ENCOUNTER
Please call surgery scheduling/surgery center to see if open orders (labs and vaccines) can be done at the time of upper endoscopy     Paloma Diaz MD

## 2020-09-29 ENCOUNTER — HOSPITAL ENCOUNTER (OUTPATIENT)
Facility: CLINIC | Age: 20
End: 2020-09-29
Attending: INTERNAL MEDICINE | Admitting: INTERNAL MEDICINE
Payer: COMMERCIAL

## 2020-09-29 ENCOUNTER — TELEPHONE (OUTPATIENT)
Dept: GASTROENTEROLOGY | Facility: CLINIC | Age: 20
End: 2020-09-29

## 2020-09-29 DIAGNOSIS — Z11.59 ENCOUNTER FOR SCREENING FOR OTHER VIRAL DISEASES: Primary | ICD-10-CM

## 2020-09-29 NOTE — TELEPHONE ENCOUNTER
Patient is scheduled for EGd with Dr. Raymundo    Spoke with: keyonna Baig    Date of Procedure: 10-15-20    Location: [address] [facility]    Sedation Type MAC    Informed patient they will need an adult  yes    Informed Patient of COVID Test Requirement yes    Preferred Pharmacy for Pre Prescription chart    Confirmed Nurse will call to complete assessment yes    Additional comments: Special Needs patient, will need oral anxiety meds, laughing gas in order to administer sedation.  Jelena Woods has been involved with the planning of the special instructions for this patient as well at [facility].

## 2020-10-12 ENCOUNTER — PATIENT OUTREACH (OUTPATIENT)
Dept: GASTROENTEROLOGY | Facility: CLINIC | Age: 20
End: 2020-10-12

## 2020-10-12 ENCOUNTER — TELEPHONE (OUTPATIENT)
Dept: GASTROENTEROLOGY | Facility: CLINIC | Age: 20
End: 2020-10-12

## 2020-10-12 DIAGNOSIS — Z11.59 ENCOUNTER FOR SCREENING FOR OTHER VIRAL DISEASES: ICD-10-CM

## 2020-10-12 PROCEDURE — U0003 INFECTIOUS AGENT DETECTION BY NUCLEIC ACID (DNA OR RNA); SEVERE ACUTE RESPIRATORY SYNDROME CORONAVIRUS 2 (SARS-COV-2) (CORONAVIRUS DISEASE [COVID-19]), AMPLIFIED PROBE TECHNIQUE, MAKING USE OF HIGH THROUGHPUT TECHNOLOGIES AS DESCRIBED BY CMS-2020-01-R: HCPCS | Performed by: INTERNAL MEDICINE

## 2020-10-12 NOTE — PROGRESS NOTES
Returned a call to pt's clinic at  to redirect them to speak with endoscopy at  regarding procedure questions. Left vm

## 2020-10-12 NOTE — TELEPHONE ENCOUNTER
Caller Jelena Woods    Reason for cancel? Pt needs to be seen in OR and not Unit J    Rescheduled? Waiting for an OR schedule    Will patient call back to reschedule : schedulers will call patients family to schedule once OR dates have been established.

## 2020-10-13 ENCOUNTER — MYC MEDICAL ADVICE (OUTPATIENT)
Dept: FAMILY MEDICINE | Facility: CLINIC | Age: 20
End: 2020-10-13

## 2020-10-13 ENCOUNTER — TELEPHONE (OUTPATIENT)
Dept: GASTROENTEROLOGY | Facility: CLINIC | Age: 20
End: 2020-10-13

## 2020-10-13 NOTE — TELEPHONE ENCOUNTER
Patient is scheduled for EGD with Dr. LYUBOV Tenorio    Spoke with: guardian    Date of Procedure: 12-11-20    Location: Main Or    Sedation Type general    Pre-op for Unit [facility] MAC yes    Informed patient they will need an adult  yes    Informed Patient of COVID Test Requirement yes    Preferred Pharmacy for Pre Prescription chart    Confirmed Nurse will call to complete assessment yes    Additional comments: pt will have additional procedure done while in the OR as well.

## 2020-10-14 ENCOUNTER — MYC MEDICAL ADVICE (OUTPATIENT)
Dept: FAMILY MEDICINE | Facility: CLINIC | Age: 20
End: 2020-10-14

## 2020-10-14 DIAGNOSIS — Q90.9 DOWN'S SYNDROME: Primary | ICD-10-CM

## 2020-10-14 NOTE — TELEPHONE ENCOUNTER
Please see [employer] message and advise. Currently plan was for pt to have flu shot and labs done at same time as her upper GI procedure.

## 2020-10-14 NOTE — TELEPHONE ENCOUNTER
Called and spoke to mother (Safia) informed mother that [facility] does not have flu mist on flu injection. Safia verbally understand.  Ladonna Steve, CMA

## 2020-10-15 LAB
SARS-COV-2 RNA SPEC QL NAA+PROBE: NOT DETECTED
SPECIMEN SOURCE: NORMAL

## 2020-10-15 NOTE — TELEPHONE ENCOUNTER
Please talon up referral for Dr.Janine Mcintosh at [facility] - call the clinic to see what specialty she is    Okay for flu mist at pharmacy    Paloma Diaz MD

## 2020-10-25 DIAGNOSIS — Z11.59 ENCOUNTER FOR SCREENING FOR OTHER VIRAL DISEASES: Primary | ICD-10-CM

## 2020-11-02 DIAGNOSIS — F41.1 GENERALIZED ANXIETY DISORDER: ICD-10-CM

## 2020-11-02 DIAGNOSIS — H69.92 DYSFUNCTION OF LEFT EUSTACHIAN TUBE: ICD-10-CM

## 2020-11-02 RX ORDER — DESVENLAFAXINE 50 MG/1
50 TABLET, FILM COATED, EXTENDED RELEASE ORAL DAILY
Qty: 90 TABLET | Refills: 1 | Status: SHIPPED | OUTPATIENT
Start: 2020-11-02 | End: 2021-04-22

## 2020-11-02 RX ORDER — FLUTICASONE PROPIONATE 50 MCG
1-2 SPRAY, SUSPENSION (ML) NASAL DAILY
Qty: 16 ML | Refills: 0 | Status: SHIPPED | OUTPATIENT
Start: 2020-11-02 | End: 2020-12-04

## 2020-11-02 NOTE — TELEPHONE ENCOUNTER
Routing refill request to provider for review/approval because:  Labs not current:  Dawit Del Rosario BSN, RN

## 2020-11-11 ENCOUNTER — TRANSFERRED RECORDS (OUTPATIENT)
Dept: HEALTH INFORMATION MANAGEMENT | Facility: CLINIC | Age: 20
End: 2020-11-11

## 2020-11-17 ENCOUNTER — TELEPHONE (OUTPATIENT)
Dept: FAMILY MEDICINE | Facility: CLINIC | Age: 20
End: 2020-11-17

## 2020-11-17 NOTE — TELEPHONE ENCOUNTER
Reason for call:  Other   Patient called regarding (reason for call): appointment    Additional comments: Rubi Blood, NP @ [facility] nurse calling wanting Dr. Diaz to contact Jeremi to update about the visit she had with patient today on 11/17/2020. Pleade call to advise.     Phone number to reach patient:  Other phone number:  [phone] - Rubi Blood direct #     Best Time:  Any     Can we leave a detailed message on this number?  YES    Travel screening: Not Applicable

## 2020-11-18 ENCOUNTER — TELEPHONE (OUTPATIENT)
Dept: OBGYN | Facility: CLINIC | Age: 20
End: 2020-11-18

## 2020-11-18 DIAGNOSIS — Q90.9 DOWN'S SYNDROME: Primary | ICD-10-CM

## 2020-11-18 NOTE — TELEPHONE ENCOUNTER
Can a pelvic exam be done during her GI scope on 12/11? [facility]  Will enter orders and see if anyone available.     Haily Taylor MD

## 2020-11-19 ENCOUNTER — TELEPHONE (OUTPATIENT)
Dept: OBGYN | Facility: CLINIC | Age: 20
End: 2020-11-19

## 2020-11-23 ENCOUNTER — PATIENT OUTREACH (OUTPATIENT)
Dept: GASTROENTEROLOGY | Facility: CLINIC | Age: 20
End: 2020-11-23

## 2020-11-23 DIAGNOSIS — K21.9 GASTROESOPHAGEAL REFLUX DISEASE WITHOUT ESOPHAGITIS: Primary | ICD-10-CM

## 2020-11-23 DIAGNOSIS — F63.3 TRICHOTILLOMANIA: ICD-10-CM

## 2020-11-24 NOTE — TELEPHONE ENCOUNTER
Per MD lab orders placed for pt to be drawn during the time they are having their procedure with Dr LYUBOV Tenorio on 12/11: CMP, Lipase, Phos, Magnesium, b12, folate, vitamin D  OR Anesthesia communicated with to facilitate lab draw.

## 2020-11-25 DIAGNOSIS — F41.1 GENERALIZED ANXIETY DISORDER: ICD-10-CM

## 2020-11-25 NOTE — TELEPHONE ENCOUNTER
Controlled Substance Refill Request for LORazepam (ATIVAN) 0.5 MG tablet  Problem List Complete:  No     PROVIDER TO CONSIDER COMPLETION OF PROBLEM LIST AND OVERVIEW/CONTROLLED SUBSTANCE AGREEMENT    Last Written Prescription Date:  11/05/2019  Last Fill Quantity: 20,   # refills: 0    THE MOST RECENT OFFICE VISIT MUST BE WITHIN THE PAST 3 MONTHS. AT LEAST ONE FACE TO FACE VISIT MUST OCCUR EVERY 6 MONTHS. ADDITIONAL VISITS CAN BE VIRTUAL.  (THIS STATEMENT SHOULD BE DELETED.)    Last Office Visit with [facility] primary care provider: 10/07/2020    Future Office visit:     Controlled substance agreement:   Encounter-Level CSA:    There are no encounter-level csa.     Patient-Level CSA:    There are no patient-level csa.         Last Urine Drug Screen: No results found for: CDAUT, No results found for: COMDAT, No results found for: THC13, PCP13, COC13, MAMP13, OPI13, AMP13, BZO13, TCA13, MTD13, BAR13, OXY13, PPX13, BUP13     Processing:  Rx to be electronically transmitted to pharmacy by provider      [email or web address]       checked in past 3 months?  No, route to JEANMARIE Steve CMA

## 2020-11-27 RX ORDER — LORAZEPAM 0.5 MG/1
0.5 TABLET ORAL EVERY 6 HOURS PRN
Qty: 20 TABLET | Refills: 0 | OUTPATIENT
Start: 2020-11-27

## 2020-11-27 NOTE — TELEPHONE ENCOUNTER
Appointment (may be video or phone visit) needed prior to further refills.     Isha Singh, APRN, CNP

## 2020-11-29 ENCOUNTER — HEALTH MAINTENANCE LETTER (OUTPATIENT)
Age: 20
End: 2020-11-29

## 2020-12-04 ENCOUNTER — TELEPHONE (OUTPATIENT)
Dept: GASTROENTEROLOGY | Facility: CLINIC | Age: 20
End: 2020-12-04

## 2020-12-04 DIAGNOSIS — H69.92 DYSFUNCTION OF LEFT EUSTACHIAN TUBE: ICD-10-CM

## 2020-12-04 RX ORDER — FLUTICASONE PROPIONATE 50 MCG
1-2 SPRAY, SUSPENSION (ML) NASAL DAILY
Qty: 16 ML | Refills: 0 | Status: SHIPPED | OUTPATIENT
Start: 2020-12-04 | End: 2021-01-13

## 2020-12-04 NOTE — TELEPHONE ENCOUNTER
[facility] Call Center    Phone Message    May a detailed message be left on voicemail: yes     Reason for Call: Other: Chaparrita, with [facility], called in and stated that she is wanting to spea k with the care coordinator before pt procedure on 12/11/2020. Please follow up when available. Thank you.      Action Taken: Message routed to:  [facility] ([facility] G[facility]    Travel Screening: Not Applicable

## 2020-12-07 NOTE — TELEPHONE ENCOUNTER
Returned call to pt's care coordinator to discuss coordination that is set up for 12/11 procedure.  Left vm for call back.

## 2020-12-08 DIAGNOSIS — Z11.59 ENCOUNTER FOR SCREENING FOR OTHER VIRAL DISEASES: ICD-10-CM

## 2020-12-08 PROCEDURE — U0003 INFECTIOUS AGENT DETECTION BY NUCLEIC ACID (DNA OR RNA); SEVERE ACUTE RESPIRATORY SYNDROME CORONAVIRUS 2 (SARS-COV-2) (CORONAVIRUS DISEASE [COVID-19]), AMPLIFIED PROBE TECHNIQUE, MAKING USE OF HIGH THROUGHPUT TECHNOLOGIES AS DESCRIBED BY CMS-2020-01-R: HCPCS | Performed by: INTERNAL MEDICINE

## 2020-12-09 ENCOUNTER — OFFICE VISIT (OUTPATIENT)
Dept: FAMILY MEDICINE | Facility: CLINIC | Age: 20
End: 2020-12-09
Payer: COMMERCIAL

## 2020-12-09 VITALS
SYSTOLIC BLOOD PRESSURE: 119 MMHG | DIASTOLIC BLOOD PRESSURE: 74 MMHG | HEART RATE: 76 BPM | BODY MASS INDEX: 38.62 KG/M2 | WEIGHT: 188 LBS | TEMPERATURE: 97.7 F | OXYGEN SATURATION: 96 %

## 2020-12-09 DIAGNOSIS — E66.01 MORBID OBESITY (H): ICD-10-CM

## 2020-12-09 DIAGNOSIS — K21.9 GASTROESOPHAGEAL REFLUX DISEASE WITHOUT ESOPHAGITIS: ICD-10-CM

## 2020-12-09 DIAGNOSIS — Q90.9 DOWN'S SYNDROME: ICD-10-CM

## 2020-12-09 DIAGNOSIS — Z01.818 PREOP GENERAL PHYSICAL EXAM: Primary | ICD-10-CM

## 2020-12-09 DIAGNOSIS — K13.0 SORE OF LIP: ICD-10-CM

## 2020-12-09 LAB
LABORATORY COMMENT REPORT: NORMAL
SARS-COV-2 RNA SPEC QL NAA+PROBE: NEGATIVE
SARS-COV-2 RNA SPEC QL NAA+PROBE: NORMAL
SPECIMEN SOURCE: NORMAL
SPECIMEN SOURCE: NORMAL

## 2020-12-09 PROCEDURE — 99214 OFFICE O/P EST MOD 30 MIN: CPT | Performed by: PHYSICIAN ASSISTANT

## 2020-12-09 RX ORDER — MUPIROCIN 20 MG/G
OINTMENT TOPICAL 3 TIMES DAILY
Qty: 30 G | Refills: 0 | Status: SHIPPED | OUTPATIENT
Start: 2020-12-09 | End: 2020-12-14

## 2020-12-09 NOTE — PATIENT INSTRUCTIONS
"Ok to take all morning medications the morning of the procedure.   Take all morning medications with a small sip of water.   Patient Education     Preparing for Your Surgery  Getting started  A surgery nurse will call you to review your health history and instructions. They will give you an arrival time based on your scheduled surgery time.  Please be ready to share the following:    Your doctor's clinic name and phone number    Your medical, surgical and anesthesia history    A list of allergies and sensitivities    A list of medicines, including herbal treatments and over-the-counter drugs    Whether the patient has a legal guardian (ask how to send us the papers in advance)  If your child is having surgery, please ask for a copy of Preparing for Your Child's Surgery.    Preparing for surgery    Within 30 days of surgery: Have an exam at your family clinic (primary care clinic), or go to a pre-operative clinic. This exam is called a \"History and Physical,\" or H&P.    At your H&P exam, talk to your care team about all medicines you take. If you need to stop any medicines before surgery, ask when to start taking them again.  ? We do this for your safety. Many medicines can make you bleed too much during surgery. Some change how well surgery (anesthesia) drugs work.    Call your insurance company to see what it will and won't pay for. Ask if they need to pre-approve the surgery. (If no insurance, call [phone].)    Call your surgeon's clinic if there's any change in your health. This includes signs of a cold or flu (sore throat, runny nose, cough, rash, fever). It also includes a scrape or scratch near the surgery site.    If you have questions on the day of surgery, call your surgery center.  Eating and drinking guidelines  For your safety: Unless your surgeon tells you otherwise, follow the guidelines below.    Eat and drink as usual until 8 hours before surgery. After that, no food or milk.    Drink clear " liquids until 2 hours before surgery. These are liquids you can see through, like water, Gatorade and Propel Water. You may also have black coffee and tea (no cream or milk).    Nothing by mouth within 2 hours of surgery. This includes gum, candy and breath mints.    Stop alcohol the midnight before surgery.    If your family clinic tells you to take medicine on the morning of surgery, it's okay to take it with a sip of water.  Preventing infection    Shower or bathe the night before and morning of your surgery. Follow the instructions your clinic gave you. (If no instructions, use regular soap.)    Don't shave or clip hair near your surgery site. This can lead to skin infection.    Don't smoke the morning of surgery. Smoking increases the risk of infection. You may chew nicotine gum up to 2 hours before surgery. A nicotine patch is okay.  ? Note: Some surgeries require you to completely quit smoking and nicotine. Check with your surgeon.    Your care team will make every effort to keep you safe from infection. We will:  ? Clean our hands often with soap and water (or an alcohol-based hand rub).  ? Clean the skin at your surgery site with a special soap that kills germs. We'll also remove hair from the site as needed.  ? Wear special hair covers, masks, gowns and gloves during surgery.  ? Give antibiotic medicine, if prescribed. Not all surgeries need antibiotics.  What to bring on the day of surgery    Photo ID and insurance card    Copy of your health care directive, if you have one    Glasses and hearing aides (bring cases)  ? You can't wear contacts during surgery    Inhaler and eye drops, if you use them (tell us about these when you arrive)    CPAP machine or breathing device, if you use them    A few personal items, if spending the night    If you have . . .  ? A pacemaker or ICD (cardiac defibrillator): Bring the ID card.  ? An implanted stimulator: Bring the remote control.  ? A legal guardian: Bring a copy  of the certified (court-stamped) guardianship papers.  Please remove any jewelry, including body piercings. Leave jewelry and other valuables at home.  If you're going home the day of surgery  Important: If you don't follow the rules below, we must cancel your surgery.     Arrange for someone to drive you home after surgery. You may not drive, take a taxi or take public transportation by yourself (unless you'll have local anesthesia only).    Arrange for a responsible adult to stay with you overnight. If you don't, we may keep you in the hospital overnight, and you may need to pay the costs yourself.  Questions?   If you have any questions for your care team, list them here: _________________________________________________________________________________________________________________________________________________________________________________________________________________________________________________________________________________________________________________________  For informational purposes only. Not to replace the advice of your health care provider. Copyright   7217-6802 [employer]. All rights reserved. Clinically reviewed by Liberty Jimenes MD. [employer] [id number] - REV 07/19.

## 2020-12-09 NOTE — PROGRESS NOTES
[facility]  [address] [place] [address]  [facility] [place] [address]  Phone: [phone]  Primary Provider: Paloma Diaz  Pre-op Performing Provider: BIMAL HOLLY    PREOPERATIVE EVALUATION:  Today's date: 12/9/2020    Cher Ibarra is a 20 year old female who presents for a preoperative evaluation. She presents with her mother whom is her legal guardian. Information obtained from mother.     Surgical Information:  Surgery/Procedure: ESOPHAGOGASTRODUODENOSCOPY (EGD  Surgery Location: [facility] OR  Surgeon: Dr. Tenorio  Surgery Date: 12/11/2020  Time of Surgery: 10:50am  Where patient plans to recover: At home with family  Fax number for surgical facility: Note does not need to be faxed, will be available electronically in Epic.    Type of Anesthesia Anticipated: General    Subjective     HPI related to upcoming procedure: EGD due to reflux and celiac testing. Patient will also be having other medical procedures while under anesthesia like labs, vaccines and a pelvic exam.      Preop Questions 12/7/2020   1. Have you ever had a heart attack or stroke? No   2. Have you ever had surgery on your heart or blood vessels, such as a stent placement, a coronary artery bypass, or surgery on an artery in your head, neck, heart, or legs? YES - ASD and VSD repair as infant.    3. Do you have chest pain with activity? No   4. Do you have a history of  heart failure? YES - with birth, resolved with ASD and VSD repair.    5. Do you currently have a cold, bronchitis or symptoms of other infection? No   6. Do you have a cough, shortness of breath, or wheezing? No   7. Do you or anyone in your family have previous history of blood clots? No   8. Do you or does anyone in your family have a serious bleeding problem such as prolonged bleeding following surgeries or cuts? No   9. Have you ever had problems with anemia or been told to take iron pills? No   10. Have you had any abnormal blood loss such as black, tarry or  bloody stools, or abnormal vaginal bleeding? No   11. Have you ever had a blood transfusion? No   11a. Have you ever had a transfusion reaction? -   12. Are you willing to have a blood transfusion if it is medically needed before, during, or after your surgery? Yes   13. Have you or any of your relatives ever had problems with anesthesia? YES - maternal grandmother-vomiting and mother -migraines   14. Do you have sleep apnea, excessive snoring or daytime drowsiness? No   15. Do you have any artifical heart valves or other implanted medical devices like a pacemaker, defibrillator, or continuous glucose monitor? No   16. Do you have artificial joints? No   17. Are you allergic to latex? No   18. Is there any chance that you may be pregnant? No       Health Care Directive:  Patient does not have a Health Care Directive or Living Will: Discussed advance care planning with patient; however, patient declined at this time.    Preoperative Review of :   reviewed - controlled substances prescribed by other outside provider(s).          Review of Systems  CONSTITUTIONAL: NEGATIVE for fever, chills, change in weight  INTEGUMENTARY/SKIN: NEGATIVE for worrisome rashes, moles or lesions  ENT/MOUTH: NEGATIVE for ear, mouth and throat problems  RESP: NEGATIVE for significant cough or SOB  CV: NEGATIVE for chest pain, palpitations or peripheral edema  GI: NEGATIVE for nausea, abdominal pain, heartburn, or change in bowel habits  : NEGATIVE for frequency, dysuria, or hematuria  MUSCULOSKELETAL: NEGATIVE for significant arthralgias or myalgia  NEURO: NEGATIVE for weakness, dizziness or paresthesias  ENDOCRINE: NEGATIVE for temperature intolerance, skin/hair changes  HEME: NEGATIVE for bleeding problems  PSYCHIATRIC: NEGATIVE for changes in mood or affect    Patient Active Problem List    Diagnosis Date Noted     Morbid obesity (H) 12/09/2020     Priority: Medium     GERD (gastroesophageal reflux disease)      Priority: Medium      Ganglion cyst of wrist, left 01/06/2020     Priority: Medium     Synovial cyst of ankle and foot region 01/06/2020     Priority: Medium     Trichotillomania      Priority: Medium     Obesity 03/05/2018     Priority: Medium     Recurrent boils 07/05/2017     Priority: Medium     Anxiety disorder 07/07/2015     Priority: Medium     Dysmenorrhea 02/24/2014     Priority: Medium     [facility] and started on naprosyn, much help.       Chronic rhinitis 07/02/2012     Priority: Medium     Down's syndrome 11/22/2010     Priority: Medium     Followed by [facility] yearly       Sleep disturbance 11/22/2010     Priority: Medium      Past Medical History:   Diagnosis Date     Anxiety      ASD (atrial septal defect) 1998    Repair of ASD/VSD     Chronic rhinitis      Down syndrome      GERD (gastroesophageal reflux disease)      Keratosis pilaris      Obesity 03/05/2018     Recurrent boils      Strabismus      Trichotillomania      Past Surgical History:   Procedure Laterality Date     AS EXCIS PRIMARY GANGLION WRIST  01/2020    wrist and ankle     MAMMOPLASTY REDUCTION       REPAIR ATRIAL SEPTAL DEFECT      age 1     REPAIR VENTRICULAR SEPTAL DEFECT      age 1     TONSILLECTOMY & ADENOIDECTOMY      AGE 3     Current Outpatient Medications   Medication Sig Dispense Refill     ammonium lactate (LAC-HYDRIN) 12 % cream Apply topically 2 times daily as needed for dry skin       cetirizine (ZYRTEC) 10 MG tablet Take 1 tablet (10 mg) by mouth every evening 90 tablet 1     Cholecalciferol (VITAMIN D3) 50 MCG (2000 UT) TABS TAKE 1 TABLET BY MOUTH DAILY 90 tablet 1     desvenlafaxine (PRISTIQ) 50 MG 24 hr tablet Take 1 tablet (50 mg) by mouth daily 90 tablet 1     fluticasone (FLONASE) 50 MCG/ACT nasal spray Spray 1-2 sprays into both nostrils daily 16 mL 0     folic acid (FOLVITE) 1 MG tablet TAKE 1 TABLET(1 MG) BY MOUTH DAILY 90 tablet 1     INTROVALE 0.15-0.03 MG  tablet TAKE 1 TABLET BY MOUTH DAILY 91 tablet 2     melatonin 5 MG tablet None Entered       mupirocin (BACTROBAN) 2 % external ointment APPLY EXTERNALLY TO THE AFFECTED AREA THREE TIMES DAILY AS NEEDED 22 g 0     naproxen (NAPROSYN) 500 MG tablet Take 1 tablet (500 mg) by mouth 2 times daily as needed 60 tablet 1     Omega-3 Fatty Acids (OMEGA-3 FISH OIL PO) Take 1 g by mouth daily       traZODone (DESYREL) 50 MG tablet Take 0.5 tablets (25 mg) by mouth At Bedtime 45 tablet 3     triamcinolone (ARISTOCORT HP) 0.5 % external cream APPLY EXTERNALLY TO THE AFFECTED AREA TWICE DAILY AS DIRECTED 30 g 0     acetylcysteine (NAC) 500 MG CAPS capsule Take 1,000 mg by mouth 2 times daily        LORazepam (ATIVAN) 0.5 MG tablet Take 1 tablet (0.5 mg) by mouth every 6 hours as needed for anxiety . No further refills until follow-up appointment 20 tablet 0       Allergies   Allergen Reactions     Azithromycin Hives     Codeine      Hydrocodone      Oxycodone      Tramadol      Zithromax [Azithromycin Dihydrate]         Social History     Tobacco Use     Smoking status: Never Smoker     Smokeless tobacco: Never Used   Substance Use Topics     Alcohol use: No       History   Drug Use No         Objective     /74 (BP Location: Left arm, Patient Position: Chair, Cuff Size: Adult Regular)   Pulse 76   Temp 97.7  F (36.5  C) (Oral)   Wt 85.3 kg (188 lb)   SpO2 96%   Breastfeeding No   BMI 38.62 kg/m      Physical Exam    GENERAL APPEARANCE: healthy, alert and no distress     EYES: EOMI, PERRL     HENT: ear canals and TM's normal and nose without ulcers or lesions Mouth with sore on the upper lip, there is a garcia type crust to this lesion.      NECK: no adenopathy, no asymmetry, masses, or scars and thyroid normal to palpation     RESP: lungs clear to auscultation - no rales, rhonchi or wheezes     CV: regular rates and rhythm, normal S1 S2, no S3 or S4 and no murmur, click or rub     ABDOMEN:  soft, nontender, no HSM or  masses and bowel sounds normal     MS: extremities normal- no gross deformities noted, no evidence of inflammation in joints, FROM in all extremities.     SKIN: no suspicious lesions or rashes     NEURO: Normal strength and tone, sensory exam grossly normal, mentation intact and speech normal     PSYCH: mentation appears normal. and affect normal/bright     LYMPHATICS: No cervical adenopathy    No results for input(s): HGB, PLT, INR, NA, POTASSIUM, CR, A1C in the last 42687 hours.     Diagnostics:  No labs were ordered during this visit.   No EKG required for low risk surgery (cataract, skin procedure, breast biopsy, etc).    Revised Cardiac Risk Index (RCRI):  The patient has the following serious cardiovascular risks for perioperative complications:   - No serious cardiac risks = 0 points     RCRI Interpretation: 0 points: Class I (very low risk - 0.4% complication rate)           Assessment & Plan   The proposed surgical procedure is considered LOW risk.    Preop general physical exam  Down's syndrome  Morbid obesity (H)  Gastroesophageal reflux disease without esophagitis  Sore of lip  Concern for possible impetigo vs repeated picking type injury. Start bactroban for treatment.   - mupirocin (BACTROBAN) 2 % external ointment; Apply topically 3 times daily for 5 days    Risks and Recommendations:  The patient has the following additional risks and recommendations for perioperative complications:   - No identified additional risk factors other than previously addressed    Medication Instructions:  Patient is to take all scheduled medications on the day of surgery    RECOMMENDATION:  APPROVAL GIVEN to proceed with proposed procedure, without further diagnostic evaluation.    Signed Electronically by: Nataly Hester PA-C    Copy of this evaluation report is provided to requesting physician.    [facility] Pre[facility]    Revised Cardiac Risk Index

## 2020-12-10 ENCOUNTER — TELEPHONE (OUTPATIENT)
Dept: FAMILY MEDICINE | Facility: CLINIC | Age: 20
End: 2020-12-10

## 2020-12-10 DIAGNOSIS — F41.1 GENERALIZED ANXIETY DISORDER: ICD-10-CM

## 2020-12-10 NOTE — TELEPHONE ENCOUNTER
Routing refill request to provider for review/approval because:  Drug not active on patient's medication list    [place] reviewed:  reviewed- no concerns    [place]  [email or web address]    Virginia Link RN

## 2020-12-10 NOTE — TELEPHONE ENCOUNTER
Controlled Substance Refill Request for LORazepam (ATIVAN) 0.5 MG tablet  Problem List Complete:  No     PROVIDER TO CONSIDER COMPLETION OF PROBLEM LIST AND OVERVIEW/CONTROLLED SUBSTANCE AGREEMENT    Last Written Prescription Date:  11/05/2019  Last Fill Quantity: 20,   # refills: 0    THE MOST RECENT OFFICE VISIT MUST BE WITHIN THE PAST 3 MONTHS. AT LEAST ONE FACE TO FACE VISIT MUST OCCUR EVERY 6 MONTHS. ADDITIONAL VISITS CAN BE VIRTUAL.  (THIS STATEMENT SHOULD BE DELETED.)    Last Office Visit with [facility] primary care provider: 12/09/2020    Future Office visit:     Controlled substance agreement:   Encounter-Level CSA:    There are no encounter-level csa.     Patient-Level CSA:    There are no patient-level csa.         Last Urine Drug Screen: No results found for: CDAUT, No results found for: COMDAT, No results found for: THC13, PCP13, COC13, MAMP13, OPI13, AMP13, BZO13, TCA13, MTD13, BAR13, OXY13, PPX13, BUP13     Processing:  Rx to be electronically transmitted to pharmacy by provider      [email or web address]       checked in past 3 months?  No, route to JEANMARIE Steve CMA

## 2020-12-11 ENCOUNTER — ANESTHESIA EVENT (OUTPATIENT)
Dept: SURGERY | Facility: CLINIC | Age: 20
End: 2020-12-11
Payer: COMMERCIAL

## 2020-12-11 ENCOUNTER — HOSPITAL ENCOUNTER (OUTPATIENT)
Facility: CLINIC | Age: 20
Discharge: HOME | End: 2020-12-11
Attending: INTERNAL MEDICINE | Admitting: INTERNAL MEDICINE
Payer: COMMERCIAL

## 2020-12-11 ENCOUNTER — TRANSFERRED RECORDS (OUTPATIENT)
Dept: HEALTH INFORMATION MANAGEMENT | Facility: CLINIC | Age: 20
End: 2020-12-11

## 2020-12-11 ENCOUNTER — ANESTHESIA (OUTPATIENT)
Dept: SURGERY | Facility: CLINIC | Age: 20
End: 2020-12-11
Payer: COMMERCIAL

## 2020-12-11 VITALS
HEART RATE: 76 BPM | DIASTOLIC BLOOD PRESSURE: 74 MMHG | TEMPERATURE: 98.1 F | WEIGHT: 183.2 LBS | RESPIRATION RATE: 16 BRPM | SYSTOLIC BLOOD PRESSURE: 126 MMHG | HEIGHT: 59 IN | BODY MASS INDEX: 36.93 KG/M2 | OXYGEN SATURATION: 98 %

## 2020-12-11 DIAGNOSIS — Q90.9 DOWN'S SYNDROME: ICD-10-CM

## 2020-12-11 LAB
ALBUMIN SERPL-MCNC: 3 G/DL (ref 3.4–5)
ALP SERPL-CCNC: 54 U/L (ref 40–150)
ALT SERPL W P-5'-P-CCNC: 17 U/L (ref 0–50)
ANION GAP SERPL CALCULATED.3IONS-SCNC: 9 MMOL/L (ref 3–14)
AST SERPL W P-5'-P-CCNC: 14 U/L (ref 0–45)
BILIRUB SERPL-MCNC: 0.4 MG/DL (ref 0.2–1.3)
BUN SERPL-MCNC: 10 MG/DL (ref 7–30)
CALCIUM SERPL-MCNC: 8.2 MG/DL (ref 8.5–10.1)
CHLORIDE SERPL-SCNC: 108 MMOL/L (ref 94–109)
CO2 SERPL-SCNC: 23 MMOL/L (ref 20–32)
CREAT SERPL-MCNC: 0.85 MG/DL (ref 0.52–1.04)
DEPRECATED CALCIDIOL+CALCIFEROL SERPL-MC: 33 UG/L (ref 20–75)
ERYTHROCYTE [DISTWIDTH] IN BLOOD BY AUTOMATED COUNT: 13.2 % (ref 10–15)
FOLATE SERPL-MCNC: 34 NG/ML
GFR SERPL CREATININE-BSD FRML MDRD: >90 ML/MIN/{1.73_M2}
GLUCOSE SERPL-MCNC: 87 MG/DL (ref 70–99)
HBA1C MFR BLD: 5.3 % (ref 0–5.6)
HCG UR QL: NEGATIVE
HCT VFR BLD AUTO: 36.7 % (ref 35–47)
HGB BLD-MCNC: 12.4 G/DL (ref 11.7–15.7)
INR PPP: 1.01 (ref 0.86–1.14)
LIPASE SERPL-CCNC: 107 U/L (ref 73–393)
MAGNESIUM SERPL-MCNC: 2 MG/DL (ref 1.6–2.3)
MCH RBC QN AUTO: 32.2 PG (ref 26.5–33)
MCHC RBC AUTO-ENTMCNC: 33.8 G/DL (ref 31.5–36.5)
MCV RBC AUTO: 95 FL (ref 78–100)
PHOSPHATE SERPL-MCNC: 2.4 MG/DL (ref 2.5–4.5)
PLATELET # BLD AUTO: 274 10E9/L (ref 150–450)
POTASSIUM SERPL-SCNC: 3.5 MMOL/L (ref 3.4–5.3)
PROT SERPL-MCNC: 6.6 G/DL (ref 6.8–8.8)
RBC # BLD AUTO: 3.85 10E12/L (ref 3.8–5.2)
SODIUM SERPL-SCNC: 140 MMOL/L (ref 133–144)
TSH SERPL DL<=0.005 MIU/L-ACNC: 1.85 MU/L (ref 0.4–4)
UPPER GI ENDOSCOPY: NORMAL
VIT B12 SERPL-MCNC: 273 PG/ML (ref 193–986)
WBC # BLD AUTO: 4.1 10E9/L (ref 4–11)

## 2020-12-11 PROCEDURE — 82607 VITAMIN B-12: CPT | Performed by: INTERNAL MEDICINE

## 2020-12-11 PROCEDURE — 84446 ASSAY OF VITAMIN E: CPT | Performed by: INTERNAL MEDICINE

## 2020-12-11 PROCEDURE — 360N000017 HC SURGERY LEVEL 2 EA 15 ADDTL MIN - UMMC: Performed by: INTERNAL MEDICINE

## 2020-12-11 PROCEDURE — 250N000009 HC RX 250: Performed by: INTERNAL MEDICINE

## 2020-12-11 PROCEDURE — 84443 ASSAY THYROID STIM HORMONE: CPT | Performed by: INTERNAL MEDICINE

## 2020-12-11 PROCEDURE — 83735 ASSAY OF MAGNESIUM: CPT | Performed by: INTERNAL MEDICINE

## 2020-12-11 PROCEDURE — 81025 URINE PREGNANCY TEST: CPT | Performed by: ANESTHESIOLOGY

## 2020-12-11 PROCEDURE — 999N000138 HC STATISTIC PRE-PROCEDURE ASSESSMENT I: Performed by: INTERNAL MEDICINE

## 2020-12-11 PROCEDURE — 88305 TISSUE EXAM BY PATHOLOGIST: CPT | Mod: 26 | Performed by: PATHOLOGY

## 2020-12-11 PROCEDURE — 370N000002 HC ANESTHESIA TECHNICAL FEE, EACH ADDTL 15 MIN: Performed by: INTERNAL MEDICINE

## 2020-12-11 PROCEDURE — 80053 COMPREHEN METABOLIC PANEL: CPT | Performed by: INTERNAL MEDICINE

## 2020-12-11 PROCEDURE — 370N000001 HC ANESTHESIA TECHNICAL FEE, 1ST 30 MIN: Performed by: INTERNAL MEDICINE

## 2020-12-11 PROCEDURE — 761N000005 HC RECOVERY PHASE 1 LEVEL 3 FIRST HR: Performed by: INTERNAL MEDICINE

## 2020-12-11 PROCEDURE — 88313 SPECIAL STAINS GROUP 2: CPT | Mod: TC | Performed by: INTERNAL MEDICINE

## 2020-12-11 PROCEDURE — 258N000003 HC RX IP 258 OP 636: Performed by: ANESTHESIOLOGY

## 2020-12-11 PROCEDURE — 44361 SMALL BOWEL ENDOSCOPY/BIOPSY: CPT | Performed by: INTERNAL MEDICINE

## 2020-12-11 PROCEDURE — 88342 IMHCHEM/IMCYTCHM 1ST ANTB: CPT | Mod: 26 | Performed by: PATHOLOGY

## 2020-12-11 PROCEDURE — 360N000016 HC SURGERY LEVEL 2 1ST 30 MIN - UMMC: Performed by: INTERNAL MEDICINE

## 2020-12-11 PROCEDURE — 272N000001 HC OR GENERAL SUPPLY STERILE: Performed by: INTERNAL MEDICINE

## 2020-12-11 PROCEDURE — 88342 IMHCHEM/IMCYTCHM 1ST ANTB: CPT | Mod: TC | Performed by: INTERNAL MEDICINE

## 2020-12-11 PROCEDURE — 85610 PROTHROMBIN TIME: CPT | Performed by: INTERNAL MEDICINE

## 2020-12-11 PROCEDURE — 82746 ASSAY OF FOLIC ACID SERUM: CPT | Performed by: INTERNAL MEDICINE

## 2020-12-11 PROCEDURE — 88305 TISSUE EXAM BY PATHOLOGIST: CPT | Mod: TC | Performed by: INTERNAL MEDICINE

## 2020-12-11 PROCEDURE — 84100 ASSAY OF PHOSPHORUS: CPT | Performed by: INTERNAL MEDICINE

## 2020-12-11 PROCEDURE — 250N000003 HC SEVOFLURANE, EA 15 MIN: Performed by: INTERNAL MEDICINE

## 2020-12-11 PROCEDURE — 82306 VITAMIN D 25 HYDROXY: CPT | Performed by: INTERNAL MEDICINE

## 2020-12-11 PROCEDURE — 83516 IMMUNOASSAY NONANTIBODY: CPT | Performed by: INTERNAL MEDICINE

## 2020-12-11 PROCEDURE — 84590 ASSAY OF VITAMIN A: CPT | Performed by: INTERNAL MEDICINE

## 2020-12-11 PROCEDURE — 250N000011 HC RX IP 250 OP 636: Performed by: NURSE ANESTHETIST, CERTIFIED REGISTERED

## 2020-12-11 PROCEDURE — 83036 HEMOGLOBIN GLYCOSYLATED A1C: CPT | Performed by: INTERNAL MEDICINE

## 2020-12-11 PROCEDURE — 761N000007 HC RECOVERY PHASE 2 EACH 15 MINS: Performed by: INTERNAL MEDICINE

## 2020-12-11 PROCEDURE — 250N000011 HC RX IP 250 OP 636: Performed by: ANESTHESIOLOGY

## 2020-12-11 PROCEDURE — 250N000009 HC RX 250: Performed by: NURSE ANESTHETIST, CERTIFIED REGISTERED

## 2020-12-11 PROCEDURE — 88313 SPECIAL STAINS GROUP 2: CPT | Mod: 26 | Performed by: PATHOLOGY

## 2020-12-11 PROCEDURE — 83690 ASSAY OF LIPASE: CPT | Performed by: INTERNAL MEDICINE

## 2020-12-11 PROCEDURE — 85027 COMPLETE CBC AUTOMATED: CPT | Performed by: INTERNAL MEDICINE

## 2020-12-11 RX ORDER — HYDROMORPHONE HYDROCHLORIDE 1 MG/ML
.3-.5 INJECTION, SOLUTION INTRAMUSCULAR; INTRAVENOUS; SUBCUTANEOUS EVERY 10 MIN PRN
Status: DISCONTINUED | OUTPATIENT
Start: 2020-12-11 | End: 2020-12-11 | Stop reason: HOSPADM

## 2020-12-11 RX ORDER — LIDOCAINE HYDROCHLORIDE 20 MG/ML
INJECTION, SOLUTION INFILTRATION; PERINEURAL PRN
Status: DISCONTINUED | OUTPATIENT
Start: 2020-12-11 | End: 2020-12-11

## 2020-12-11 RX ORDER — PROCHLORPERAZINE MALEATE 5 MG
10 TABLET ORAL EVERY 6 HOURS PRN
Status: DISCONTINUED | OUTPATIENT
Start: 2020-12-11 | End: 2020-12-11 | Stop reason: HOSPADM

## 2020-12-11 RX ORDER — NALOXONE HYDROCHLORIDE 0.4 MG/ML
0.4 INJECTION, SOLUTION INTRAMUSCULAR; INTRAVENOUS; SUBCUTANEOUS
Status: DISCONTINUED | OUTPATIENT
Start: 2020-12-11 | End: 2020-12-11 | Stop reason: HOSPADM

## 2020-12-11 RX ORDER — MEPERIDINE HYDROCHLORIDE 25 MG/ML
12.5 INJECTION INTRAMUSCULAR; INTRAVENOUS; SUBCUTANEOUS
Status: DISCONTINUED | OUTPATIENT
Start: 2020-12-11 | End: 2020-12-11 | Stop reason: HOSPADM

## 2020-12-11 RX ORDER — NALOXONE HYDROCHLORIDE 0.4 MG/ML
0.2 INJECTION, SOLUTION INTRAMUSCULAR; INTRAVENOUS; SUBCUTANEOUS
Status: DISCONTINUED | OUTPATIENT
Start: 2020-12-11 | End: 2020-12-11 | Stop reason: HOSPADM

## 2020-12-11 RX ORDER — OMEPRAZOLE 10 MG/1
10 CAPSULE, DELAYED RELEASE ORAL DAILY
COMMUNITY
End: 2022-06-14

## 2020-12-11 RX ORDER — ONDANSETRON 2 MG/ML
4 INJECTION INTRAMUSCULAR; INTRAVENOUS EVERY 30 MIN PRN
Status: DISCONTINUED | OUTPATIENT
Start: 2020-12-11 | End: 2020-12-11 | Stop reason: HOSPADM

## 2020-12-11 RX ORDER — FENTANYL CITRATE 50 UG/ML
INJECTION, SOLUTION INTRAMUSCULAR; INTRAVENOUS PRN
Status: DISCONTINUED | OUTPATIENT
Start: 2020-12-11 | End: 2020-12-11

## 2020-12-11 RX ORDER — SODIUM CHLORIDE, SODIUM LACTATE, POTASSIUM CHLORIDE, CALCIUM CHLORIDE 600; 310; 30; 20 MG/100ML; MG/100ML; MG/100ML; MG/100ML
INJECTION, SOLUTION INTRAVENOUS CONTINUOUS
Status: DISCONTINUED | OUTPATIENT
Start: 2020-12-11 | End: 2020-12-11 | Stop reason: HOSPADM

## 2020-12-11 RX ORDER — ONDANSETRON 4 MG/1
4 TABLET, ORALLY DISINTEGRATING ORAL EVERY 6 HOURS PRN
Status: DISCONTINUED | OUTPATIENT
Start: 2020-12-11 | End: 2020-12-11 | Stop reason: HOSPADM

## 2020-12-11 RX ORDER — FLUMAZENIL 0.1 MG/ML
0.2 INJECTION, SOLUTION INTRAVENOUS
Status: DISCONTINUED | OUTPATIENT
Start: 2020-12-11 | End: 2020-12-11 | Stop reason: HOSPADM

## 2020-12-11 RX ORDER — LORAZEPAM 0.5 MG/1
0.5 TABLET ORAL EVERY 6 HOURS PRN
Qty: 20 TABLET | Refills: 0 | Status: SHIPPED | OUTPATIENT
Start: 2020-12-11 | End: 2021-03-29

## 2020-12-11 RX ORDER — ONDANSETRON 2 MG/ML
INJECTION INTRAMUSCULAR; INTRAVENOUS PRN
Status: DISCONTINUED | OUTPATIENT
Start: 2020-12-11 | End: 2020-12-11

## 2020-12-11 RX ORDER — FENTANYL CITRATE 50 UG/ML
25-50 INJECTION, SOLUTION INTRAMUSCULAR; INTRAVENOUS EVERY 5 MIN PRN
Status: DISCONTINUED | OUTPATIENT
Start: 2020-12-11 | End: 2020-12-11 | Stop reason: HOSPADM

## 2020-12-11 RX ORDER — ONDANSETRON 2 MG/ML
4 INJECTION INTRAMUSCULAR; INTRAVENOUS EVERY 6 HOURS PRN
Status: DISCONTINUED | OUTPATIENT
Start: 2020-12-11 | End: 2020-12-11 | Stop reason: HOSPADM

## 2020-12-11 RX ORDER — ONDANSETRON 4 MG/1
4 TABLET, ORALLY DISINTEGRATING ORAL EVERY 30 MIN PRN
Status: DISCONTINUED | OUTPATIENT
Start: 2020-12-11 | End: 2020-12-11 | Stop reason: HOSPADM

## 2020-12-11 RX ORDER — PROPOFOL 10 MG/ML
INJECTION, EMULSION INTRAVENOUS PRN
Status: DISCONTINUED | OUTPATIENT
Start: 2020-12-11 | End: 2020-12-11

## 2020-12-11 RX ORDER — LIDOCAINE 40 MG/G
CREAM TOPICAL
Status: DISCONTINUED | OUTPATIENT
Start: 2020-12-11 | End: 2020-12-11 | Stop reason: HOSPADM

## 2020-12-11 RX ORDER — DOXYCYCLINE HYCLATE 150 MG/1
150 TABLET, DELAYED RELEASE ORAL DAILY
COMMUNITY
End: 2021-09-22

## 2020-12-11 RX ADMIN — SODIUM CHLORIDE, POTASSIUM CHLORIDE, SODIUM LACTATE AND CALCIUM CHLORIDE: 600; 310; 30; 20 INJECTION, SOLUTION INTRAVENOUS at 11:19

## 2020-12-11 RX ADMIN — ROCURONIUM BROMIDE 100 MG: 10 INJECTION INTRAVENOUS at 11:16

## 2020-12-11 RX ADMIN — LIDOCAINE HYDROCHLORIDE 100 MG: 20 INJECTION, SOLUTION INFILTRATION; PERINEURAL at 11:16

## 2020-12-11 RX ADMIN — ONDANSETRON 4 MG: 2 INJECTION INTRAMUSCULAR; INTRAVENOUS at 12:43

## 2020-12-11 RX ADMIN — ONDANSETRON 4 MG: 2 INJECTION INTRAMUSCULAR; INTRAVENOUS at 11:42

## 2020-12-11 RX ADMIN — FENTANYL CITRATE 100 MCG: 50 INJECTION, SOLUTION INTRAMUSCULAR; INTRAVENOUS at 11:16

## 2020-12-11 RX ADMIN — SUGAMMADEX 200 MG: 100 INJECTION, SOLUTION INTRAVENOUS at 11:47

## 2020-12-11 RX ADMIN — PROPOFOL 160 MG: 10 INJECTION, EMULSION INTRAVENOUS at 11:16

## 2020-12-11 ASSESSMENT — MIFFLIN-ST. JEOR: SCORE: 1488.69

## 2020-12-11 NOTE — ANESTHESIA POSTPROCEDURE EVALUATION
Anesthesia POST Procedure Evaluation    Patient: Cher Ibarra   MRN:     [id number] Gender:   female   Age:    20 year old :      2000        Preoperative Diagnosis: Trichotillomania [F63.3]   Procedure(s):  ESOPHAGOGASTRODUODENOSCOPY, WITH BIOPSY   Postop Comments: No value filed.     Anesthesia Type: General       Disposition: Outpatient   Postop Pain Control: Uneventful            Sign Out: Well controlled pain   PONV: No   Neuro/Psych: Uneventful            Sign Out: Acceptable/Baseline neuro status   Airway/Respiratory: Uneventful            Sign Out: Acceptable/Baseline resp. status   CV/Hemodynamics: Uneventful            Sign Out: Acceptable CV status   Other NRE: NONE   DID A NON-ROUTINE EVENT OCCUR? No    Event details/Postop Comments:  Uneventful anesthetic.  6.0 ETT used during case because of patient's medical history and inability to have IV access prior to entering operating room.  Oral exam was possible prior to case, but patient wasn't fully compliant.  Therefore we elected to use a smaller than expected ETT; however, a 7.0 ETT could have easily been passed upon examination of vocal cords.           Last Anesthesia Record Vitals:  CRNA VITALS  2020 1128 - 2020 1228      2020             Pulse:  101    Ht Rate:  111    SpO2:  97 %    Resp Rate (observed):  (!) 6          Last PACU Vitals:  Vitals Value Taken Time   /77 20 1245   Temp 37.3  C (99.1  F) 20 1215   Pulse 70 20 1240   Resp 16 20 1245   SpO2 97 % 20 1248   Temp src     NIBP     Pulse     SpO2     Resp     Temp     Ht Rate     Temp 2     Vitals shown include unvalidated device data.      Electronically Signed By: Vance Fleming MD, 2020, 12:54 PM

## 2020-12-11 NOTE — DISCHARGE INSTRUCTIONS
[facility]  Same-Day Surgery   Adult Discharge Orders & Instructions     For 24 hours after surgery    1. Get plenty of rest.  A responsible adult must stay with you for at least 24 hours after you leave the hospital.   2. Do not drive or use heavy equipment.  If you have weakness or tingling, don't drive or use heavy equipment until this feeling goes away.  3. Do not drink alcohol.  4. Avoid strenuous or risky activities.  Ask for help when climbing stairs.   5. You may feel lightheaded.  IF so, sit for a few minutes before standing.  Have someone help you get up.   6. If you have nausea (feel sick to your stomach): Drink only clear liquids such as apple juice, ginger ale, broth or 7-Up.  Rest may also help.  Be sure to drink enough fluids.  Move to a regular diet as you feel able.  7. You may have a slight fever. Call the doctor if your fever is over 100 F (37.7 C) (taken under the tongue) or lasts longer than 24 hours.  8. You may have a dry mouth, a sore throat, muscle aches or trouble sleeping.  These should go away after 24 hours.  9. Do not make important or legal decisions.   Call your doctor for any of the followin.  Signs of infection (fever, growing tenderness at the surgery site, a large amount of drainage or bleeding, severe pain, foul-smelling drainage, redness, swelling).    2. It has been over 8 to 10 hours since surgery and you are still not able to urinate (pass water).    3.  Headache for over 24 hours.    To contact a doctor, call [phone] during normal business hours or: [phone] and ask for the resident on call for GI (answered 24 hours a day)      Emergency Department:    [facility]: [phone]       (TTY for hearing impaired: [phone])    [facility]: [phone]       (TTY for hearing impaired: [phone])

## 2020-12-11 NOTE — ANESTHESIA PROCEDURE NOTES
Airway   Date/Time: 12/11/2020 11:17 AM   Patient location during procedure: OR    Staff -   CRNA: Diane Silvestre APRN CRNA  Performed By: CRNA and with residents    Consent for Airway   Urgency: elective    Indications and Patient Condition  Indications for airway management: tania-procedural  Induction type:RSIMask difficulty assessment: 0 - not attempted    Final Airway Details  Final airway type: endotracheal airway  Successful airway:ETT - single  Endotracheal Airway Details   ETT size (mm): 6.0 (Patient could tolerate a larger ET tube.)  Cuffed: yes  Cuff volume (mL): 8  Successful intubation technique: video laryngoscopy  Grade View of Cords: 1  Adjucts: stylet  Measured from: lips  Secured at (cm): 20  Secured with: pink tape  Bite block used: Oral Airway    Post intubation assessment   Placement verified by: capnometry, equal breath sounds and chest rise   Number of attempts at approach: 1  Number of other approaches attempted: 0  Secured with:pink tape  Ease of procedure: easy  Dentition: Intact and UnchangedAdditional Comments  Intubation performed by residentBilly.

## 2020-12-11 NOTE — ANESTHESIA PREPROCEDURE EVALUATION
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]   Procedure(s):  ESOPHAGOGASTRODUODENOSCOPY (EGD)     LABS:  CBC: No results found for: WBC, HGB, HCT, PLT  BMP:   Lab Results   Component Value Date     10/15/2014    POTASSIUM 3.5 10/15/2014    CHLORIDE 106 10/15/2014    BUN 17 10/15/2014    CR 1.02 10/15/2014     (A) 10/15/2014    @ 2008     COAGS: No results found for: PTT, INR, FIBR  POC:   Lab Results   Component Value Date    HCG Negative 2020     OTHER:   Lab Results   Component Value Date    FRANKI 9.0 10/15/2014    ALBUMIN 4.0 10/15/2014    PROTTOTAL 7.0 10/15/2014    ALT 18 10/15/2014    AST 15 10/15/2014    ALKPHOS 77 10/15/2014    BILITOTAL 0.4 10/15/2014    TSH 1.44 2019        Preop Vitals    BP Readings from Last 3 Encounters:   20 116/83   20 119/74   20 104/63    Pulse Readings from Last 3 Encounters:   20 116   20 76   20 96      Resp Readings from Last 3 Encounters:   20 19   20 16   19 18    SpO2 Readings from Last 3 Encounters:   20 99%   20 96%   20 97%      Temp Readings from Last 1 Encounters:   20 36.4  C (97.5  F) (Oral)    Ht Readings from Last 1 Encounters:   20 1.486 m (4' 10.5\")      Wt Readings from Last 1 Encounters:   20 83.1 kg (183 lb 3.2 oz)    Estimated body mass index is 37.64 kg/m  as calculated from the following:    Height as of this encounter: 1.486 m (4' 10.5\").    Weight as of this encounter: 83.1 kg (183 lb 3.2 oz).     LDA:        Past Medical History:   Diagnosis Date     Anxiety      ASD (atrial septal defect) 1998    Repair of ASD/VSD     Chronic rhinitis      Down syndrome     global developmental delay     GERD (gastroesophageal reflux disease)      Keratosis pilaris      Obesity " 03/05/2018     Recurrent boils      Strabismus      Trichotillomania       Past Surgical History:   Procedure Laterality Date     AS EXCIS PRIMARY GANGLION WRIST  01/2020    wrist and ankle     MAMMOPLASTY REDUCTION       REPAIR ATRIAL SEPTAL DEFECT      age 1     REPAIR VENTRICULAR SEPTAL DEFECT      age 1     TONSILLECTOMY & ADENOIDECTOMY      AGE 3      Allergies   Allergen Reactions     Azithromycin Hives     Codeine      Hydrocodone      Oxycodone      Tramadol      Zithromax [Azithromycin Dihydrate]         Anesthesia Evaluation     . Pt has had prior anesthetic. Type: General    No history of anesthetic complications          ROS/MED HX    ENT/Pulmonary:     (+)LAMBERTO risk factors obese, , . .    Neurologic:     (+)Developmental delay (Down's Syndrome)      Cardiovascular: Comment: ASD/VSD s/p repair shortly after birth.        METS/Exercise Tolerance:  >4 METS   Hematologic:         Musculoskeletal:         GI/Hepatic:     (+) GERD (Not currently medicated.  )       Renal/Genitourinary:         Endo:     (+) Obesity, .      Psychiatric:         Infectious Disease:         Malignancy:         Other:    (+) No chance of pregnancy C-spine cleared: Yes, no H/O Chronic Pain,no other significant disability                        PHYSICAL EXAM:   Mental Status/Neuro: Abnormal Mental Status  Abnormal Mental Status: Alert (Developmental delay)   Airway: Facies: Challenging; Thick Neck; Micrognathia  Mallampati: IV  Mouth/Opening: Full  TM distance: < 6 cm  Neck ROM: Full   Respiratory:   Resp. Rate: Normal     Resp. Effort: Normal      CV:   Rate: Age appropriate  Edema: None   Comments: Poor dentition.     Dental: Details                Assessment:   ASA SCORE: 3    H&P: History and physical reviewed and following examination; no interval change.   Smoking Status:  Non-Smoker/Unknown   NPO Status: NPO Appropriate     Plan:   Anes. Type:  General   Pre-Medication: None   Induction:  IV (Standard)   Airway: ETT; Oral    Access/Monitoring: PIV (Peripheral IV at time of induction due to patient anxiety and mental compliance.  Plan for masked nitrous oxide for sedation and then place PIV.)   Maintenance: Balanced     Postop Plan:   Postop Pain: Opioids  Postop Sedation/Airway: Not planned     PONV Management:   Adult Risk Factors: Female, Non-Smoker, Postop Opioids   Prevention: Ondansetron, Dexamethasone     CONSENT: Direct conversation   Plan and risks discussed with: Patient; Mother   Blood Products: Consent Deferred (Minimal Blood Loss)                   Vance Fleming MD

## 2020-12-11 NOTE — ANESTHESIA CARE TRANSFER NOTE
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    Anesthesia Type:   General     Note:  Airway :Nasal Cannula  Patient transferred to:PACU  Comments: Vss, report to RN Handoff Report: Identifed the Patient, Identified the Reponsible Provider, Reviewed the pertinent medical history, Discussed the surgical course, Reviewed Intra-OP anesthesia mangement and issues during anesthesia, Set expectations for post-procedure period and Allowed opportunity for questions and acknowledgement of understanding      Vitals: (Last set prior to Anesthesia Care Transfer)    CRNA VITALS  12/11/2020 1128 - 12/11/2020 1203      12/11/2020             Pulse:  101    Ht Rate:  111    SpO2:  97 %    Resp Rate (observed):  (!) 6                Electronically Signed By: AUBREY Orona CRNA  December 11, 2020  12:03 PM

## 2020-12-14 LAB
A-TOCOPHEROL VIT E SERPL-MCNC: 7.4 MG/L (ref 5.5–18)
ANNOTATION COMMENT IMP: NORMAL
BETA+GAMMA TOCOPHEROL SERPL-MCNC: 1.2 MG/L (ref 0–6)
RETINYL PALMITATE SERPL-MCNC: <0.02 MG/L (ref 0–0.1)
VIT A SERPL-MCNC: 0.49 MG/L (ref 0.3–1.2)

## 2020-12-14 NOTE — OR NURSING
Pt needs to be sedated prior to any needles including iv, injection or blood draw. Please made all team members aware per mother on follow up.

## 2020-12-15 LAB — COPATH REPORT: NORMAL

## 2020-12-15 NOTE — RESULT ENCOUNTER NOTE
Cher,  Biopsies look ok, no celiac disease.  There was some evidence of some inflammation possible from a hair ball in your stomach.      The blood work looks ok.  You do have slightly decrease albumin and phosphorous level (the calcium is likely only low because your albumin is low-they go together).   Not sure if this is nutrutional but it could be. Fortunately the other nutritional blood tests look fine.   It might be a good idea to see a dietician with your Mom.   Let me know if you want to do that.   Dr. Tenorio

## 2020-12-16 ENCOUNTER — MYC MEDICAL ADVICE (OUTPATIENT)
Dept: FAMILY MEDICINE | Facility: CLINIC | Age: 20
End: 2020-12-16

## 2020-12-16 DIAGNOSIS — F63.3 TRICHOTILLOMANIA: ICD-10-CM

## 2020-12-16 DIAGNOSIS — Q90.9 DOWN'S SYNDROME: Primary | ICD-10-CM

## 2020-12-16 DIAGNOSIS — K21.9 GASTROESOPHAGEAL REFLUX DISEASE WITHOUT ESOPHAGITIS: ICD-10-CM

## 2020-12-16 DIAGNOSIS — E66.01 MORBID OBESITY (H): ICD-10-CM

## 2020-12-16 LAB
TTG IGA SER-ACNC: 1 U/ML
TTG IGG SER-ACNC: 1 U/ML

## 2021-01-01 NOTE — Clinical Note
1910 Bedside and Verbal shift change report given to JAMESON Lr RN (oncoming nurse) by Precious Angel. Ambar Driver RN (offgoing nurse). Report included the following information SBAR, Kardex, Intake/Output, MAR and Recent Results. 2130 infant being held by father at this time. 0710 Bedside and Verbal shift change report given to  Enedina Mendez RN (oncoming nurse) by Sky Garland RN (offgoing nurse). Report included the following information SBAR, Kardex, Intake/Output, MAR and Recent Results. Please fax preop to adan with labs,reports if done. Cathy Fish MD

## 2021-01-11 DIAGNOSIS — H69.92 DYSFUNCTION OF LEFT EUSTACHIAN TUBE: ICD-10-CM

## 2021-01-12 DIAGNOSIS — H69.92 DYSFUNCTION OF LEFT EUSTACHIAN TUBE: ICD-10-CM

## 2021-01-13 RX ORDER — FLUTICASONE PROPIONATE 50 MCG
SPRAY, SUSPENSION (ML) NASAL
Start: 2021-01-13

## 2021-01-13 RX ORDER — FLUTICASONE PROPIONATE 50 MCG
1-2 SPRAY, SUSPENSION (ML) NASAL DAILY
Qty: 16 ML | Refills: 3 | Status: SHIPPED | OUTPATIENT
Start: 2021-01-13 | End: 2023-08-16

## 2021-01-18 ENCOUNTER — MYC MEDICAL ADVICE (OUTPATIENT)
Dept: FAMILY MEDICINE | Facility: CLINIC | Age: 21
End: 2021-01-18

## 2021-01-18 DIAGNOSIS — L85.8 KERATOSIS PILARIS: ICD-10-CM

## 2021-01-18 DIAGNOSIS — L73.2 HIDRADENITIS SUPPURATIVA: ICD-10-CM

## 2021-01-18 DIAGNOSIS — L57.0 AK (ACTINIC KERATOSIS): Primary | ICD-10-CM

## 2021-01-20 ENCOUNTER — MYC MEDICAL ADVICE (OUTPATIENT)
Dept: FAMILY MEDICINE | Facility: CLINIC | Age: 21
End: 2021-01-20

## 2021-01-30 DIAGNOSIS — Q90.9 DOWN'S SYNDROME: ICD-10-CM

## 2021-02-01 RX ORDER — FOLIC ACID 1 MG/1
TABLET ORAL
Qty: 90 TABLET | Refills: 1 | Status: SHIPPED | OUTPATIENT
Start: 2021-02-01

## 2021-02-03 ENCOUNTER — TRANSFERRED RECORDS (OUTPATIENT)
Dept: HEALTH INFORMATION MANAGEMENT | Facility: CLINIC | Age: 21
End: 2021-02-03

## 2021-02-11 ENCOUNTER — TRANSFERRED RECORDS (OUTPATIENT)
Dept: HEALTH INFORMATION MANAGEMENT | Facility: CLINIC | Age: 21
End: 2021-02-11

## 2021-03-01 DIAGNOSIS — Q90.9 DOWN'S SYNDROME: ICD-10-CM

## 2021-03-03 RX ORDER — CHOLECALCIFEROL (VITAMIN D3) 50 MCG
TABLET ORAL
Qty: 90 TABLET | Refills: 1 | Status: SHIPPED | OUTPATIENT
Start: 2021-03-03

## 2021-03-14 ENCOUNTER — MYC MEDICAL ADVICE (OUTPATIENT)
Dept: FAMILY MEDICINE | Facility: CLINIC | Age: 21
End: 2021-03-14

## 2021-03-15 ENCOUNTER — MYC MEDICAL ADVICE (OUTPATIENT)
Dept: FAMILY MEDICINE | Facility: CLINIC | Age: 21
End: 2021-03-15

## 2021-03-15 DIAGNOSIS — Q90.9 DOWN'S SYNDROME: ICD-10-CM

## 2021-03-15 DIAGNOSIS — F41.1 GENERALIZED ANXIETY DISORDER: Primary | ICD-10-CM

## 2021-03-15 DIAGNOSIS — Z23 NEED FOR VACCINATION: ICD-10-CM

## 2021-03-15 NOTE — TELEPHONE ENCOUNTER
Please see [employer] message below and advise. Can nitrous be ordered so pt can get covid vaccine? Could this be arranged with vaccine clinic?    Carmen Bueno RN  [facility]

## 2021-03-22 NOTE — TELEPHONE ENCOUNTER
Called [facility] and left detailed message on voicemail advising staff to return call at [phone] to inquire further about patient receiving COVID-19 vaccine with sedation.    Martha TRAYLORN, RN  [facility], [place]

## 2021-03-22 NOTE — TELEPHONE ENCOUNTER
Care team - please call [facility], Rubi Blood NP and group, at [phone], to get advice on where to have patient vaccinated with sedation for COVID-19, as we don't do this at our vaccine sites at this time   Paloma Diaz MD

## 2021-03-23 NOTE — TELEPHONE ENCOUNTER
Called [facility] team. They received the message and are working on this.  asked if pt is eligible through  or if they heard they are elligible through [facility]. Advised that pt is eligible through [facility], but we cannot give the vaccine with our clinic with sedation. States she will reach out the their covid team and make sure she is eligible through their clinic. Asked that she call us to let us know status of this. Mychart update was also sent.    Carmen Bueno RN  [facility]

## 2021-03-26 RX ORDER — LIDOCAINE AND PRILOCAINE 25; 25 MG/G; MG/G
CREAM TOPICAL
Qty: 1 KIT | Refills: 0 | Status: SHIPPED | OUTPATIENT
Start: 2021-03-26

## 2021-03-28 ENCOUNTER — MYC MEDICAL ADVICE (OUTPATIENT)
Dept: FAMILY MEDICINE | Facility: CLINIC | Age: 21
End: 2021-03-28

## 2021-03-28 DIAGNOSIS — F41.1 GENERALIZED ANXIETY DISORDER: ICD-10-CM

## 2021-03-29 RX ORDER — LORAZEPAM 0.5 MG/1
0.5 TABLET ORAL EVERY 6 HOURS PRN
Qty: 20 TABLET | Refills: 0 | Status: SHIPPED | OUTPATIENT
Start: 2021-03-29 | End: 2021-05-10

## 2021-03-29 NOTE — TELEPHONE ENCOUNTER
Dr. Diaz,  Please see [employer] message below and advise.     Carmen Bueno RN  [facility]

## 2021-03-31 ENCOUNTER — TELEPHONE (OUTPATIENT)
Dept: FAMILY MEDICINE | Facility: CLINIC | Age: 21
End: 2021-03-31

## 2021-03-31 NOTE — TELEPHONE ENCOUNTER
Spoke with Anita from [facility]. She states that they don't have vaccines currently at the [facility]. They are also starting with and prioritizing highest risk patients still currently and will expand as more vaccine becomes available. They do have conscious sedation at their site, but do not carry the vaccine, and the sites that do have the vaccine, do not have the capability/equiptment necessary for administering conscious sedation. She states that they have received this request for several of their patients so they are discussing if there is any way that they can figure out offering this in the future. She states that she will keep pt's info on file and reach out to patient's mom if there is any change. Writer also read Cherrish message that was sent from Dr. Diaz to patient with the current plan to Anita so she is aware.

## 2021-04-07 ENCOUNTER — OFFICE VISIT (OUTPATIENT)
Dept: FAMILY MEDICINE | Facility: CLINIC | Age: 21
End: 2021-04-07
Payer: COMMERCIAL

## 2021-04-07 VITALS
DIASTOLIC BLOOD PRESSURE: 68 MMHG | WEIGHT: 185.5 LBS | HEART RATE: 84 BPM | OXYGEN SATURATION: 96 % | BODY MASS INDEX: 38.11 KG/M2 | TEMPERATURE: 97.9 F | SYSTOLIC BLOOD PRESSURE: 114 MMHG

## 2021-04-07 DIAGNOSIS — R10.84 ABDOMINAL PAIN, GENERALIZED: Primary | ICD-10-CM

## 2021-04-07 DIAGNOSIS — Z30.9 ENCOUNTER FOR CONTRACEPTIVE MANAGEMENT, UNSPECIFIED TYPE: ICD-10-CM

## 2021-04-07 DIAGNOSIS — B35.3 TINEA PEDIS OF LEFT FOOT: ICD-10-CM

## 2021-04-07 DIAGNOSIS — R19.5 HARD STOOL: ICD-10-CM

## 2021-04-07 LAB
ALBUMIN UR-MCNC: NEGATIVE MG/DL
APPEARANCE UR: CLEAR
BILIRUB UR QL STRIP: NEGATIVE
COLOR UR AUTO: YELLOW
GLUCOSE UR STRIP-MCNC: NEGATIVE MG/DL
HGB UR QL STRIP: NEGATIVE
KETONES UR STRIP-MCNC: NEGATIVE MG/DL
LEUKOCYTE ESTERASE UR QL STRIP: NEGATIVE
NITRATE UR QL: NEGATIVE
PH UR STRIP: 6 PH (ref 5–7)
SOURCE: NORMAL
SP GR UR STRIP: 1.02 (ref 1–1.03)
UROBILINOGEN UR STRIP-ACNC: 0.2 EU/DL (ref 0.2–1)

## 2021-04-07 PROCEDURE — 99214 OFFICE O/P EST MOD 30 MIN: CPT | Performed by: NURSE PRACTITIONER

## 2021-04-07 PROCEDURE — 81003 URINALYSIS AUTO W/O SCOPE: CPT | Performed by: NURSE PRACTITIONER

## 2021-04-07 RX ORDER — LEVONORGESTREL AND ETHINYL ESTRADIOL 0.15-0.03
1 KIT ORAL DAILY
Qty: 91 TABLET | Refills: 2 | Status: SHIPPED | OUTPATIENT
Start: 2021-04-07 | End: 2022-02-13

## 2021-04-07 RX ORDER — CLOTRIMAZOLE 1 %
CREAM (GRAM) TOPICAL EVERY MORNING
Qty: 30 G | Refills: 1 | Status: SHIPPED | OUTPATIENT
Start: 2021-04-07

## 2021-04-07 RX ORDER — DOCUSATE SODIUM 100 MG/1
100 CAPSULE, LIQUID FILLED ORAL DAILY
Qty: 90 CAPSULE | Refills: 3 | Status: SHIPPED | OUTPATIENT
Start: 2021-04-07 | End: 2022-04-21

## 2021-04-07 ASSESSMENT — PAIN SCALES - GENERAL: PAINLEVEL: MILD PAIN (2)

## 2021-04-07 NOTE — PROGRESS NOTES
"    Assessment & Plan     Encounter for contraceptive management, unspecified type  Refill requested.   - levonorgestrel-ethinyl estradiol (INTROVALE) 0.15-0.03 MG tablet; Take 1 tablet by mouth daily    Abdominal pain, generalized  Rule out UTI, mass.  - *UA reflex to Microscopic and Culture ([place] and [facility] (except [place] and [place])  - US Abdomen Complete; Future    Hard stool  Will try docusate to see if symptoms improve.  - docusate sodium (COLACE) 100 MG capsule; Take 1 capsule (100 mg) by mouth daily    Tinea pedis of left foot    - clotrimazole (LOTRIMIN) 1 % external cream; Apply topically every morning    Assessment requiring an independent historian(s) - family - mom and dad  Diagnosis or treatment significantly limited by social determinants of health - cognitive limitations  Ordering of each unique test  Prescription drug management         BMI:   Estimated body mass index is 38.11 kg/m  as calculated from the following:    Height as of 12/11/20: 1.486 m (4' 10.5\").    Weight as of this encounter: 84.1 kg (185 lb 8 oz).           Return for Pending test results. We will call with results..    AUBREY Swan CNP  [facility] VICKY Kwon is a 20 year old who presents for the following health issues  accompanied by her mother and father:    HPI     Abdominal/Flank Pain  Onset/Duration: 6 months  Description:   Character: unable to describe.   Location: epigastric region  Radiation: None  Intensity: moderate  Progression of Symptoms:  Last few days doesn't seem as bad, but symptoms are off and on  Accompanying Signs & Symptoms:  Fever/Chills: no  Gas/Bloating: YES- but is always gassy  Nausea: unsure  Vomitting: no  Diarrhea: no  Constipation: no, very hard stool but goes daily.   Dysuria or Hematuria: no  History:   Trauma: no  Previous similar pain: no  Previous tests done: upper GI- normal- reviewed  Precipitating factors:   Does the pain change " with:     Food: no    Bowel Movement: no    Urination: no   Other factors:  YES- eats hair, nails, paper  Therapies tried and outcome: omeprazole, Ibuprofen, tylenol, stool softener not really helping,  heating pad seems to help.   No LMP recorded. (Menstrual status: Birth Control).       Patient using omeprazole.  Points to lower abdomen when asked where pain is.  She is on oral contraceptive and has light period every 3 months.  Otherwise no changes.      Patient notes continued athlete's foot to her left plantar foot.  Lotrimin spray is not helping.        Review of Systems   Constitutional, HEENT, cardiovascular, pulmonary, gi and gu systems are negative, except as otherwise noted.      Objective    /68   Pulse 84   Temp 97.9  F (36.6  C) (Oral)   Wt 84.1 kg (185 lb 8 oz)   SpO2 96%   BMI 38.11 kg/m    Body mass index is 38.11 kg/m .  Physical Exam   GENERAL: healthy, alert and no distress  RESP: lungs clear to auscultation - no rales, rhonchi or wheezes  CV: regular rate and rhythm, normal S1 S2, no S3 or S4, no murmur, click or rub, no peripheral edema and peripheral pulses strong  ABDOMEN: tenderness umbilical, no organomegaly or masses and bowel sounds decreased.  MS: no gross musculoskeletal defects noted, no edema  SKIN: xerosis - left plantar foot

## 2021-04-07 NOTE — RESULT ENCOUNTER NOTE
Dear Cher,    Your recent test results are attached.      Normal urine.    If you have any questions please feel free to contact [phone] or myself via [employer].    Sincerely,  Ailyn Mazariegos, CNP

## 2021-04-09 NOTE — TELEPHONE ENCOUNTER
Janis Reid gave me your name. Is there a way for this patient to get help from [employer] for a vaccine at the [facility]?    Paloma Diaz MD

## 2021-04-13 ENCOUNTER — ANCILLARY PROCEDURE (OUTPATIENT)
Dept: ULTRASOUND IMAGING | Facility: CLINIC | Age: 21
End: 2021-04-13
Attending: NURSE PRACTITIONER
Payer: COMMERCIAL

## 2021-04-13 DIAGNOSIS — R10.84 ABDOMINAL PAIN, GENERALIZED: ICD-10-CM

## 2021-04-13 PROCEDURE — 76700 US EXAM ABDOM COMPLETE: CPT | Performed by: RADIOLOGY

## 2021-04-13 NOTE — RESULT ENCOUNTER NOTE
Dear Cher,    Your recent test results are attached.      No acute findings on ultrasound.  It does show possible fatty liver, which can be improved with exercise and weight loss.  How is abdominal pain?  Any improvement with stool softeners?    If you have any questions please feel free to contact [phone] or myself via [employer].    Sincerely,  Ailyn Mazariegos, CNP

## 2021-05-08 DIAGNOSIS — F41.1 GENERALIZED ANXIETY DISORDER: ICD-10-CM

## 2021-05-10 RX ORDER — LORAZEPAM 0.5 MG/1
TABLET ORAL
Qty: 20 TABLET | Refills: 0 | Status: SHIPPED | OUTPATIENT
Start: 2021-05-10 | End: 2021-08-02

## 2021-05-21 ENCOUNTER — IMMUNIZATION (OUTPATIENT)
Dept: LAB | Facility: CLINIC | Age: 21
End: 2021-05-21
Payer: COMMERCIAL

## 2021-07-07 ENCOUNTER — TELEPHONE (OUTPATIENT)
Dept: OBGYN | Facility: CLINIC | Age: 21
End: 2021-07-07

## 2021-07-07 NOTE — TELEPHONE ENCOUNTER
----- Message from Judy Farmer RN sent at 7/7/2021 10:04 AM CDT -----  Regarding: PAULA Avery from [facility].  Complex care provider, Rowena Blood, wants patient to see Dr Pablo or Dr Taylor for sedated pelvic.  Due in the fall.  Also need to coordinate labs and flu vaccine if possible.    [phone]

## 2021-07-07 NOTE — TELEPHONE ENCOUNTER
Tried to reach Gena,  but received voicemail.  Left message to call back to nurse emergency line to discuss timing and all of desired procedures to be coordinated..

## 2021-07-09 NOTE — TELEPHONE ENCOUNTER
"Call received from Gena at [facility] regarding patient being due for pap smear/pelvic/and breast exam. States exam to be done under anesthesia. Plan is to schedule sometime this fall. Pt also has labs due and flu vaccine that would like to be completed while under sedation. Gena reports that pt's mother also has noticed \"black heads\" in pt's perineum she would like assessed as well during exam.     Advised I would reach out to Dr. Taylor to review and see if telephone visit is warranted prior to EUA. Gena agreed. States she will fax lab and flu vaccine orders that [facility] providers wish to be completed along with pt updated demographics and contact information for [facility] Coordinator. Advised would reach out with any questions and will be in touch to coordinate telephone visit if necessary.     "

## 2021-07-16 DIAGNOSIS — Q90.9 DOWN'S SYNDROME: Primary | ICD-10-CM

## 2021-07-19 ENCOUNTER — TELEPHONE (OUTPATIENT)
Dept: OBGYN | Facility: CLINIC | Age: 21
End: 2021-07-19

## 2021-07-19 NOTE — TELEPHONE ENCOUNTER
Discussed patient and request for EUA along with labs and flu shot and possible telephone visit. Dr. Taylor in agreement and orders placed.     Called and left message with Gena at [facility] at [phone] to call back to follow up and inform her that pt's mother can call to schedule EUA and telephone visit with Dr. Taylor if she wishes.

## 2021-07-20 ENCOUNTER — TELEPHONE (OUTPATIENT)
Dept: OBGYN | Facility: CLINIC | Age: 21
End: 2021-07-20

## 2021-07-20 NOTE — TELEPHONE ENCOUNTER
Call received from patient's mother, Safia. Reviewed EUA and lab orders. Safia states at this time she does not feel she needs a telephone visit prior to EUA. Encouraged her to reach out with any concerns prior to EUA. Transferred to sx  to schedule.

## 2021-07-20 NOTE — TELEPHONE ENCOUNTER
Confirmed surgery date, time and location, 10/1/21 arrival time at 7:30a.m with nothing to eat eight hours before scheduled surgery time, clear liquids up to two hours before, h&p required within 30 days, patient made aware, COVID testing 96 hours prior, map and letter mailed out.     to complete the following fields:            CHECKLIST     Google Calendar : Yes     Resident notified: Not Applicable     Clinic schedule blocked:  Not Applicable    Patient notified:Yes      Pre op information sent: Yes     Given to patient over the phone.Yes    Comments:

## 2021-07-21 DIAGNOSIS — Z11.59 ENCOUNTER FOR SCREENING FOR OTHER VIRAL DISEASES: ICD-10-CM

## 2021-08-02 ENCOUNTER — MYC REFILL (OUTPATIENT)
Dept: FAMILY MEDICINE | Facility: CLINIC | Age: 21
End: 2021-08-02

## 2021-08-02 DIAGNOSIS — F41.1 GENERALIZED ANXIETY DISORDER: ICD-10-CM

## 2021-08-02 RX ORDER — LORAZEPAM 0.5 MG/1
0.5 TABLET ORAL EVERY 6 HOURS PRN
Qty: 20 TABLET | Refills: 0 | Status: SHIPPED | OUTPATIENT
Start: 2021-08-02 | End: 2022-02-25

## 2021-08-02 NOTE — TELEPHONE ENCOUNTER
Controlled Substance Refill Request for  LORazepam (ATIVAN) 0.5 MG tablet     Problem List Complete:  Yes   checked in past 3 months?  No, route to RN

## 2021-08-04 ENCOUNTER — TRANSFERRED RECORDS (OUTPATIENT)
Dept: HEALTH INFORMATION MANAGEMENT | Facility: CLINIC | Age: 21
End: 2021-08-04

## 2021-08-06 ENCOUNTER — MEDICAL CORRESPONDENCE (OUTPATIENT)
Dept: HEALTH INFORMATION MANAGEMENT | Facility: CLINIC | Age: 21
End: 2021-08-06

## 2021-09-19 ENCOUNTER — HEALTH MAINTENANCE LETTER (OUTPATIENT)
Age: 21
End: 2021-09-19

## 2021-09-22 ENCOUNTER — OFFICE VISIT (OUTPATIENT)
Dept: FAMILY MEDICINE | Facility: CLINIC | Age: 21
End: 2021-09-22
Payer: COMMERCIAL

## 2021-09-22 VITALS
BODY MASS INDEX: 35.76 KG/M2 | HEIGHT: 59 IN | SYSTOLIC BLOOD PRESSURE: 105 MMHG | TEMPERATURE: 98.1 F | DIASTOLIC BLOOD PRESSURE: 71 MMHG | WEIGHT: 177.4 LBS | HEART RATE: 65 BPM

## 2021-09-22 DIAGNOSIS — Q90.9 DOWN'S SYNDROME: ICD-10-CM

## 2021-09-22 DIAGNOSIS — Z01.818 PREOP GENERAL PHYSICAL EXAM: Primary | ICD-10-CM

## 2021-09-22 PROCEDURE — 99214 OFFICE O/P EST MOD 30 MIN: CPT | Performed by: NURSE PRACTITIONER

## 2021-09-22 ASSESSMENT — MIFFLIN-ST. JEOR: SCORE: 1459.69

## 2021-09-22 NOTE — PROGRESS NOTES
[facility]  [address] [address]  Phone: [phone]  Primary Provider: Paloma Diaz  Pre-op Performing Provider: PANTERA GARCIA      PREOPERATIVE EVALUATION:  Today's date: 9/22/2021    Cher Ibarra is a 21 year old female who presents for a preoperative evaluation.    Surgical Information:  Surgery/Procedure: pap smear, breast exam, possible other vaginal swabs, update vaccines, lab work.   Surgery Location: [facility]   Surgeon: Dr. Haily Taylor  Surgery Date: 10/01/2021  Time of Surgery: 9:30 am   Where patient plans to recover: At home with family  Fax number for surgical facility: Note does not need to be faxed, will be available electronically in Epic.    Type of Anesthesia Anticipated: to be determined    Assessment & Plan     The proposed surgical procedure is considered INTERMEDIATE risk.    Preop general physical exam      Down's syndrome  Okay for procedure as planned.           Risks and Recommendations:  The patient has the following additional risks and recommendations for perioperative complications:   - No identified additional risk factors other than previously addressed    Medication Instructions:  Patient is to take all scheduled medications on the day of surgery    RECOMMENDATION:  APPROVAL GIVEN to proceed with proposed procedure, without further diagnostic evaluation.                      Subjective     HPI related to upcoming procedure: patient will undergo pap smear, lab work and flu vaccines under anesthesia due to cognitive disability.    Preop Questions 9/20/2021   1. Have you ever had a heart attack or stroke? No   2. Have you ever had surgery on your heart or blood vessels, such as a stent placement, a coronary artery bypass, or surgery on an artery in your head, neck, heart, or legs? No   3. Do you have chest pain with activity? No   4. Do you have a history of  heart failure? YES - History of  ASD, VSD repair, no symptoms currently.   5. Do you currently have a cold, bronchitis or symptoms of other infection? No   6. Do you have a cough, shortness of breath, or wheezing? No   7. Do you or anyone in your family have previous history of blood clots? YES - Grandmother had blood clot following valve replacement.   8. Do you or does anyone in your family have a serious bleeding problem such as prolonged bleeding following surgeries or cuts? No   9. Have you ever had problems with anemia or been told to take iron pills? No   10. Have you had any abnormal blood loss such as black, tarry or bloody stools, or abnormal vaginal bleeding? No   11. Have you ever had a blood transfusion? No   11a. Have you ever had a transfusion reaction? -   12. Are you willing to have a blood transfusion if it is medically needed before, during, or after your surgery? Yes   13. Have you or any of your relatives ever had problems with anesthesia? YES - Grandmother has PONV, psychosis with anesthesia; Mother has headache follow anesthesia   14. Do you have sleep apnea, excessive snoring or daytime drowsiness? No   15. Do you have any artifical heart valves or other implanted medical devices like a pacemaker, defibrillator, or continuous glucose monitor? No   16. Do you have artificial joints? No   17. Are you allergic to latex? No   18. Is there any chance that you may be pregnant? No       Health Care Directive:  Patient does not have a Health Care Directive or Living Will: Discussed advance care planning with patient; however, patient declined at this time.    Preoperative Review of :   reviewed - controlled substances reflected in medication list.      Status of Chronic Conditions:  See problem list for active medical problems.  Problems all longstanding and stable, except as noted/documented.  See ROS for pertinent symptoms related to these conditions.      Review of Systems  Constitutional, neuro, ENT, endocrine, pulmonary,  cardiac, gastrointestinal, genitourinary, musculoskeletal, integument and psychiatric systems are negative, except as otherwise noted.    Patient Active Problem List    Diagnosis Date Noted     Morbid obesity (H) 12/09/2020     Priority: Medium     GERD (gastroesophageal reflux disease)      Priority: Medium     Ganglion cyst of wrist, left 01/06/2020     Priority: Medium     Synovial cyst of ankle and foot region 01/06/2020     Priority: Medium     Trichotillomania      Priority: Medium     Obesity 03/05/2018     Priority: Medium     Recurrent boils 07/05/2017     Priority: Medium     Anxiety disorder 07/07/2015     Priority: Medium     Dysmenorrhea 02/24/2014     Priority: Medium     [facility] and started on naprosyn, much help.       Chronic rhinitis 07/02/2012     Priority: Medium     Down's syndrome 11/22/2010     Priority: Medium     Followed by [facility] yearly       Sleep disturbance 11/22/2010     Priority: Medium      Past Medical History:   Diagnosis Date     Anxiety      ASD (atrial septal defect) 1998    Repair of ASD/VSD     Chronic rhinitis      Down syndrome     global developmental delay     GERD (gastroesophageal reflux disease)      Keratosis pilaris      Obesity 03/05/2018     Recurrent boils      Strabismus      Trichotillomania      Past Surgical History:   Procedure Laterality Date     AS EXCIS PRIMARY GANGLION WRIST  01/2020    wrist and ankle     ESOPHAGOSCOPY, GASTROSCOPY, DUODENOSCOPY (EGD), COMBINED N/A 12/11/2020    Procedure: ESOPHAGOGASTRODUODENOSCOPY, WITH BIOPSY;  Surgeon: Antolin Tenorio MD;  Location: [facility] OR     MAMMOPLASTY REDUCTION       REPAIR ATRIAL SEPTAL DEFECT      age 1     REPAIR VENTRICULAR SEPTAL DEFECT      age 1     TONSILLECTOMY & ADENOIDECTOMY      AGE 3     Current Outpatient Medications   Medication Sig Dispense Refill     ammonium lactate (LAC-HYDRIN) 12 % cream Apply topically 2 times daily as  needed for dry skin       cetirizine (ZYRTEC) 10 MG tablet Take 1 tablet (10 mg) by mouth every evening 90 tablet 1     Cholecalciferol (VITAMIN D3) 50 MCG (2000 UT) TABS TAKE 1 TABLET BY MOUTH DAILY 90 tablet 1     clotrimazole (LOTRIMIN) 1 % external cream Apply topically every morning 30 g 1     docusate sodium (COLACE) 100 MG capsule Take 1 capsule (100 mg) by mouth daily 90 capsule 3     doxycycline hyclate (DORYX) 150 MG EC tablet Take 150 mg by mouth daily       escitalopram (LEXAPRO) 20 MG tablet Take 1 tablet (20 mg) by mouth daily 90 tablet 3     fluticasone (FLONASE) 50 MCG/ACT nasal spray Spray 1-2 sprays into both nostrils daily 16 mL 3     folic acid (FOLVITE) 1 MG tablet TAKE 1 TABLET(1 MG) BY MOUTH DAILY 90 tablet 1     levonorgestrel-ethinyl estradiol (INTROVALE) 0.15-0.03 MG tablet Take 1 tablet by mouth daily 91 tablet 2     lidocaine-prilocaine (LIDOPRIL) 2.5-2.5 % kit Use as directed 1 kit 0     LORazepam (ATIVAN) 0.5 MG tablet Take 1 tablet (0.5 mg) by mouth every 6 hours as needed for anxiety 20 tablet 0     melatonin 5 MG tablet None Entered       mupirocin (BACTROBAN) 2 % external ointment APPLY EXTERNALLY TO THE AFFECTED AREA THREE TIMES DAILY AS NEEDED 22 g 0     naproxen (NAPROSYN) 500 MG tablet Take 1 tablet (500 mg) by mouth 2 times daily as needed 60 tablet 1     Omega-3 Fatty Acids (OMEGA-3 FISH OIL PO) Take 1 g by mouth daily       omeprazole (PRILOSEC) 10 MG DR capsule Take 10 mg by mouth daily       traZODone (DESYREL) 50 MG tablet Take 0.5 tablets (25 mg) by mouth At Bedtime 45 tablet 3     triamcinolone (ARISTOCORT HP) 0.5 % external cream APPLY EXTERNALLY TO THE AFFECTED AREA TWICE DAILY AS DIRECTED 30 g 0       Allergies   Allergen Reactions     Azithromycin Hives     Codeine      Hydrocodone      Oxycodone      Seasonal Allergies      Tramadol      Zithromax [Azithromycin Dihydrate]         Social History     Tobacco Use     Smoking status: Never Smoker     Smokeless tobacco:  Never Used   Substance Use Topics     Alcohol use: No       History   Drug Use No         Objective     There were no vitals taken for this visit.    Physical Exam    GENERAL APPEARANCE: healthy, alert and no distress     EYES: EOMI, PERRL     HENT: ear canals and TM's normal and nose and mouth without ulcers or lesions     NECK: no adenopathy, no asymmetry, masses, or scars and thyroid normal to palpation     RESP: lungs clear to auscultation - no rales, rhonchi or wheezes     CV: regular rates and rhythm, normal S1 S2, no S3 or S4 and no murmur, click or rub     ABDOMEN:  soft, nontender, no HSM or masses and bowel sounds normal     MS: extremities normal- no gross deformities noted, no evidence of inflammation in joints, FROM in all extremities.     SKIN: no suspicious lesions or rashes     NEURO: Normal strength and tone, sensory exam grossly normal, mentation intact and speech normal     PSYCH: mentation appears normal. and affect normal/bright     LYMPHATICS: No cervical adenopathy    Recent Labs   Lab Test 12/11/20  1128   HGB 12.4      INR 1.01      POTASSIUM 3.5   CR 0.85   A1C 5.3        Diagnostics:  No labs were ordered during this visit.   No EKG required for low risk surgery (cataract, skin procedure, breast biopsy, etc).    Revised Cardiac Risk Index (RCRI):  The patient has the following serious cardiovascular risks for perioperative complications:   - No serious cardiac risks = 0 points     RCRI Interpretation: 0 points: Class I (very low risk - 0.4% complication rate)           Signed Electronically by: AUBREY Swan CNP  Copy of this evaluation report is provided to requesting physician.

## 2021-09-22 NOTE — PATIENT INSTRUCTIONS

## 2021-09-28 ENCOUNTER — LAB (OUTPATIENT)
Dept: LAB | Facility: CLINIC | Age: 21
End: 2021-09-28
Attending: OBSTETRICS & GYNECOLOGY
Payer: COMMERCIAL

## 2021-09-28 DIAGNOSIS — Z11.59 ENCOUNTER FOR SCREENING FOR OTHER VIRAL DISEASES: ICD-10-CM

## 2021-09-28 PROCEDURE — U0003 INFECTIOUS AGENT DETECTION BY NUCLEIC ACID (DNA OR RNA); SEVERE ACUTE RESPIRATORY SYNDROME CORONAVIRUS 2 (SARS-COV-2) (CORONAVIRUS DISEASE [COVID-19]), AMPLIFIED PROBE TECHNIQUE, MAKING USE OF HIGH THROUGHPUT TECHNOLOGIES AS DESCRIBED BY CMS-2020-01-R: HCPCS

## 2021-09-28 PROCEDURE — U0005 INFEC AGEN DETEC AMPLI PROBE: HCPCS

## 2021-09-29 LAB — SARS-COV-2 RNA RESP QL NAA+PROBE: NEGATIVE

## 2021-09-30 ENCOUNTER — DOCUMENTATION ONLY (OUTPATIENT)
Dept: OTHER | Facility: CLINIC | Age: 21
End: 2021-09-30

## 2021-09-30 ENCOUNTER — ANESTHESIA EVENT (OUTPATIENT)
Dept: SURGERY | Facility: CLINIC | Age: 21
End: 2021-09-30
Payer: COMMERCIAL

## 2021-09-30 NOTE — ANESTHESIA PREPROCEDURE EVALUATION
"Anesthesia Pre-Procedure Evaluation    Patient: Cher Ibarra   MRN:     [id number] Gender:   female   Age:    21 year old :      2000        Preoperative Diagnosis: Down's syndrome [Q90.9]   Procedure(s):  EXAM UNDER ANESTHESIA, PELVIS, pap smear, breast exam, possible other vaginal swabs     LABS:  CBC:   Lab Results   Component Value Date    WBC 4.1 2020    HGB 12.4 2020    HCT 36.7 2020     2020     BMP:   Lab Results   Component Value Date     2020     10/15/2014    POTASSIUM 3.5 2020    POTASSIUM 3.5 10/15/2014    CHLORIDE 108 2020    CHLORIDE 106 10/15/2014    CO2 23 2020    BUN 10 2020    BUN 17 10/15/2014    CR 0.85 2020    CR 1.02 10/15/2014    GLC 87 2020     (A) 10/15/2014     COAGS:   Lab Results   Component Value Date    INR 1.01 2020     POC:   Lab Results   Component Value Date    HCG Negative 2020     OTHER:   Lab Results   Component Value Date    A1C 5.3 2020    FRANKI 8.2 (L) 2020    PHOS 2.4 (L) 2020    MAG 2.0 2020    ALBUMIN 3.0 (L) 2020    PROTTOTAL 6.6 (L) 2020    ALT 17 2020    AST 14 2020    ALKPHOS 54 2020    BILITOTAL 0.4 2020    LIPASE 107 2020    TSH 1.85 2020        Preop Vitals    BP Readings from Last 3 Encounters:   21 105/71   21 114/68   20 126/74    Pulse Readings from Last 3 Encounters:   21 65   21 84   20 76      Resp Readings from Last 3 Encounters:   20 16   20 16   19 18    SpO2 Readings from Last 3 Encounters:   21 96%   20 98%   20 96%      Temp Readings from Last 1 Encounters:   21 36.7  C (98.1  F) (Oral)    Ht Readings from Last 1 Encounters:   21 1.49 m (4' 10.65\")      Wt Readings from Last 1 Encounters:   21 80.5 kg (177 lb 6.4 oz)    Estimated body mass index is 36.26 kg/m  as calculated from the " "following:    Height as of 9/22/21: 1.49 m (4' 10.65\").    Weight as of 9/22/21: 80.5 kg (177 lb 6.4 oz).     LDA:        Past Medical History:   Diagnosis Date     Anxiety      ASD (atrial septal defect) 1998    Repair of ASD/VSD     Chronic rhinitis      Down syndrome     global developmental delay     GERD (gastroesophageal reflux disease)      Keratosis pilaris      Obesity 03/05/2018     Recurrent boils      Strabismus      Trichotillomania       Past Surgical History:   Procedure Laterality Date     AS EXCIS PRIMARY GANGLION WRIST  01/2020    wrist and ankle     ESOPHAGOSCOPY, GASTROSCOPY, DUODENOSCOPY (EGD), COMBINED N/A 12/11/2020    Procedure: ESOPHAGOGASTRODUODENOSCOPY, WITH BIOPSY;  Surgeon: Antolin Tenorio MD;  Location: [facility] OR     MAMMOPLASTY REDUCTION       REPAIR ATRIAL SEPTAL DEFECT      age 1     REPAIR VENTRICULAR SEPTAL DEFECT      age 1     TONSILLECTOMY & ADENOIDECTOMY      AGE 3      Allergies   Allergen Reactions     Azithromycin Hives     Codeine      Hydrocodone      Oxycodone      Seasonal Allergies      Tramadol      Zithromax [Azithromycin Dihydrate]         Anesthesia Evaluation    ROS/Med Hx    No history of anesthetic complications  Comments:   Cher Ibarra is a 21 year old woman with Down Syndrome, developmental delay who presents for pelvic exam under anesthesia, pap smear, breast exam, possible other vaginal swabs, vaccine administration, and lab draw.    Most recent anesthetic 12/11/20 ([facility]):  RSI, easy intubation with a 6.0 cETT, VL Mac 3; G1V  - Notes indicate that a 7.0 ETT would likely be appropriate    Patient required nitrous administration for PIV placement in the OR.    Cardiovascular Findings   (+) congenital heart disease (ASD and VSD s/p repair)    Neuro Findings   (+) developmental delay  Comments:   - Anxiety  - Trichotillomania        HENT Findings   (+) hearing problem (Mild conductive hearing loss)  Comments:   - Tonsillectomy and adenoidectomy at age 3  - " Mild sleep-disordered breathing       - Sleep study on 9/15/20 with apnea-hypopnea index of 4.7     Skin Findings   Comments:   - Hidradenitis suppurativa        GI/Hepatic/Renal Findings   (+) GERD    GERD is well controlled      Genetic/Syndrome Findings   (+) genetic syndrome              PHYSICAL EXAM:   Mental Status/Neuro: Abnormal Mental Status  Abnormal Mental Status: Delayed   Airway: Facies: Syndrome specific features; Macroglossia (consistent with trisomy 21)  Mallampati: Not Assessed  Mouth/Opening: Not Assessed  TM distance: > 6 cm  Neck ROM: Full   Respiratory: Auscultation: CTAB     Resp. Rate: Normal     Resp. Effort: Normal      CV: Rhythm: Regular  Rate: Age appropriate  Heart: Normal Sounds  Edema: None   Comments:      Dental: Normal Dentition                Anesthesia Plan    ASA Status:  2   NPO Status:  NPO Appropriate    Anesthesia Type: General.     - Airway: LMA   Induction: Intravenous.   Maintenance: Balanced.        Consents    Anesthesia Plan(s) and associated risks, benefits, and realistic alternatives discussed. Questions answered and patient/representative(s) expressed understanding.     - Discussed with:  Parent (Mother and/or Father)      - Extended Intubation/Ventilatory Support Discussed: No.      - Patient is DNR/DNI Status: No    Use of blood products discussed: No .     Postoperative Care       PONV prophylaxis: Ondansetron (or other 5HT-3), Dexamethasone or Solumedrol     Comments:      - Possible premedication with PO midazolam. To OR for PIV placement that may require nitrous administration.   - Relevant risks, benefits, alternatives and the anesthetic plan were discussed with patient/family or family representative.  All questions were answered and there was agreement to proceed.           Maricruz López MD

## 2021-10-01 ENCOUNTER — HOSPITAL ENCOUNTER (OUTPATIENT)
Facility: CLINIC | Age: 21
Discharge: HOME | End: 2021-10-01
Attending: OBSTETRICS & GYNECOLOGY | Admitting: OBSTETRICS & GYNECOLOGY
Payer: COMMERCIAL

## 2021-10-01 ENCOUNTER — ANESTHESIA (OUTPATIENT)
Dept: SURGERY | Facility: CLINIC | Age: 21
End: 2021-10-01
Payer: COMMERCIAL

## 2021-10-01 VITALS
DIASTOLIC BLOOD PRESSURE: 58 MMHG | RESPIRATION RATE: 16 BRPM | TEMPERATURE: 97.5 F | SYSTOLIC BLOOD PRESSURE: 99 MMHG | BODY MASS INDEX: 35.16 KG/M2 | HEIGHT: 59 IN | OXYGEN SATURATION: 98 % | WEIGHT: 174.38 LBS | HEART RATE: 84 BPM

## 2021-10-01 DIAGNOSIS — Q90.9 DOWN'S SYNDROME: ICD-10-CM

## 2021-10-01 LAB
ALBUMIN SERPL-MCNC: 3.2 G/DL (ref 3.4–5)
ALP SERPL-CCNC: 74 U/L (ref 40–150)
ALT SERPL W P-5'-P-CCNC: 24 U/L (ref 0–50)
ANION GAP SERPL CALCULATED.3IONS-SCNC: 7 MMOL/L (ref 3–14)
AST SERPL W P-5'-P-CCNC: 16 U/L (ref 0–45)
BASOPHILS # BLD AUTO: 0 10E3/UL (ref 0–0.2)
BASOPHILS NFR BLD AUTO: 1 %
BILIRUB SERPL-MCNC: 0.3 MG/DL (ref 0.2–1.3)
BUN SERPL-MCNC: 8 MG/DL (ref 7–30)
CALCIUM SERPL-MCNC: 8.3 MG/DL (ref 8.5–10.1)
CHLORIDE BLD-SCNC: 110 MMOL/L (ref 94–109)
CO2 SERPL-SCNC: 20 MMOL/L (ref 20–32)
CREAT SERPL-MCNC: 0.88 MG/DL (ref 0.52–1.04)
DEPRECATED CALCIDIOL+CALCIFEROL SERPL-MC: 35 UG/L (ref 20–75)
EOSINOPHIL # BLD AUTO: 0 10E3/UL (ref 0–0.7)
EOSINOPHIL NFR BLD AUTO: 1 %
ERYTHROCYTE [DISTWIDTH] IN BLOOD BY AUTOMATED COUNT: 12.9 % (ref 10–15)
FERRITIN SERPL-MCNC: 58 NG/ML (ref 12–150)
GFR SERPL CREATININE-BSD FRML MDRD: >90 ML/MIN/1.73M2
GLUCOSE BLD-MCNC: 95 MG/DL (ref 70–99)
HBA1C MFR BLD: 5.3 % (ref 0–5.6)
HCG UR QL: NEGATIVE
HCT VFR BLD AUTO: 38 % (ref 35–47)
HGB BLD-MCNC: 12.9 G/DL (ref 11.7–15.7)
IMM GRANULOCYTES # BLD: 0 10E3/UL
IMM GRANULOCYTES NFR BLD: 0 %
LYMPHOCYTES # BLD AUTO: 2 10E3/UL (ref 0.8–5.3)
LYMPHOCYTES NFR BLD AUTO: 39 %
MAGNESIUM SERPL-MCNC: 2.2 MG/DL (ref 1.6–2.3)
MCH RBC QN AUTO: 31.9 PG (ref 26.5–33)
MCHC RBC AUTO-ENTMCNC: 33.9 G/DL (ref 31.5–36.5)
MCV RBC AUTO: 94 FL (ref 78–100)
MONOCYTES # BLD AUTO: 0.4 10E3/UL (ref 0–1.3)
MONOCYTES NFR BLD AUTO: 8 %
NEUTROPHILS # BLD AUTO: 2.6 10E3/UL (ref 1.6–8.3)
NEUTROPHILS NFR BLD AUTO: 51 %
NRBC # BLD AUTO: 0 10E3/UL
NRBC BLD AUTO-RTO: 0 /100
PHOSPHATE SERPL-MCNC: 2.6 MG/DL (ref 2.5–4.5)
PLATELET # BLD AUTO: 275 10E3/UL (ref 150–450)
POTASSIUM BLD-SCNC: 3.3 MMOL/L (ref 3.4–5.3)
PROT SERPL-MCNC: 7.3 G/DL (ref 6.8–8.8)
RBC # BLD AUTO: 4.05 10E6/UL (ref 3.8–5.2)
SODIUM SERPL-SCNC: 137 MMOL/L (ref 133–144)
TSH SERPL DL<=0.005 MIU/L-ACNC: 2.35 MU/L (ref 0.4–4)
WBC # BLD AUTO: 5 10E3/UL (ref 4–11)

## 2021-10-01 PROCEDURE — 272N000001 HC OR GENERAL SUPPLY STERILE: Performed by: OBSTETRICS & GYNECOLOGY

## 2021-10-01 PROCEDURE — 82728 ASSAY OF FERRITIN: CPT | Performed by: OBSTETRICS & GYNECOLOGY

## 2021-10-01 PROCEDURE — 85025 COMPLETE CBC W/AUTO DIFF WBC: CPT | Performed by: OBSTETRICS & GYNECOLOGY

## 2021-10-01 PROCEDURE — 710N000012 HC RECOVERY PHASE 2, PER MINUTE: Performed by: OBSTETRICS & GYNECOLOGY

## 2021-10-01 PROCEDURE — G0145 SCR C/V CYTO,THINLAYER,RESCR: HCPCS | Performed by: OBSTETRICS & GYNECOLOGY

## 2021-10-01 PROCEDURE — 83735 ASSAY OF MAGNESIUM: CPT | Performed by: OBSTETRICS & GYNECOLOGY

## 2021-10-01 PROCEDURE — 370N000017 HC ANESTHESIA TECHNICAL FEE, PER MIN: Performed by: OBSTETRICS & GYNECOLOGY

## 2021-10-01 PROCEDURE — 83036 HEMOGLOBIN GLYCOSYLATED A1C: CPT | Performed by: OBSTETRICS & GYNECOLOGY

## 2021-10-01 PROCEDURE — 84100 ASSAY OF PHOSPHORUS: CPT | Performed by: OBSTETRICS & GYNECOLOGY

## 2021-10-01 PROCEDURE — 999N000141 HC STATISTIC PRE-PROCEDURE NURSING ASSESSMENT: Performed by: OBSTETRICS & GYNECOLOGY

## 2021-10-01 PROCEDURE — 360N000074 HC SURGERY LEVEL 1, PER MIN: Performed by: OBSTETRICS & GYNECOLOGY

## 2021-10-01 PROCEDURE — 84443 ASSAY THYROID STIM HORMONE: CPT | Performed by: OBSTETRICS & GYNECOLOGY

## 2021-10-01 PROCEDURE — 250N000013 HC RX MED GY IP 250 OP 250 PS 637: Performed by: ANESTHESIOLOGY

## 2021-10-01 PROCEDURE — G0101 CA SCREEN;PELVIC/BREAST EXAM: HCPCS | Performed by: OBSTETRICS & GYNECOLOGY

## 2021-10-01 PROCEDURE — 250N000009 HC RX 250: Performed by: NURSE ANESTHETIST, CERTIFIED REGISTERED

## 2021-10-01 PROCEDURE — 81025 URINE PREGNANCY TEST: CPT | Performed by: OBSTETRICS & GYNECOLOGY

## 2021-10-01 PROCEDURE — 82306 VITAMIN D 25 HYDROXY: CPT | Performed by: OBSTETRICS & GYNECOLOGY

## 2021-10-01 PROCEDURE — 250N000011 HC RX IP 250 OP 636: Performed by: NURSE ANESTHETIST, CERTIFIED REGISTERED

## 2021-10-01 PROCEDURE — 710N000010 HC RECOVERY PHASE 1, LEVEL 2, PER MIN: Performed by: OBSTETRICS & GYNECOLOGY

## 2021-10-01 PROCEDURE — 90686 IIV4 VACC NO PRSV 0.5 ML IM: CPT | Performed by: OBSTETRICS & GYNECOLOGY

## 2021-10-01 PROCEDURE — 250N000025 HC SEVOFLURANE, PER MIN: Performed by: OBSTETRICS & GYNECOLOGY

## 2021-10-01 PROCEDURE — 80053 COMPREHEN METABOLIC PANEL: CPT | Performed by: OBSTETRICS & GYNECOLOGY

## 2021-10-01 PROCEDURE — 250N000011 HC RX IP 250 OP 636: Performed by: OBSTETRICS & GYNECOLOGY

## 2021-10-01 PROCEDURE — 258N000003 HC RX IP 258 OP 636: Performed by: NURSE ANESTHETIST, CERTIFIED REGISTERED

## 2021-10-01 PROCEDURE — G0008 ADMIN INFLUENZA VIRUS VAC: HCPCS | Performed by: OBSTETRICS & GYNECOLOGY

## 2021-10-01 RX ORDER — PROPOFOL 10 MG/ML
INJECTION, EMULSION INTRAVENOUS PRN
Status: DISCONTINUED | OUTPATIENT
Start: 2021-10-01 | End: 2021-10-01

## 2021-10-01 RX ORDER — DEXAMETHASONE SODIUM PHOSPHATE 4 MG/ML
INJECTION, SOLUTION INTRA-ARTICULAR; INTRALESIONAL; INTRAMUSCULAR; INTRAVENOUS; SOFT TISSUE PRN
Status: DISCONTINUED | OUTPATIENT
Start: 2021-10-01 | End: 2021-10-01

## 2021-10-01 RX ORDER — SODIUM CHLORIDE, SODIUM LACTATE, POTASSIUM CHLORIDE, CALCIUM CHLORIDE 600; 310; 30; 20 MG/100ML; MG/100ML; MG/100ML; MG/100ML
INJECTION, SOLUTION INTRAVENOUS CONTINUOUS PRN
Status: DISCONTINUED | OUTPATIENT
Start: 2021-10-01 | End: 2021-10-01

## 2021-10-01 RX ORDER — EPHEDRINE SULFATE 50 MG/ML
INJECTION, SOLUTION INTRAMUSCULAR; INTRAVENOUS; SUBCUTANEOUS PRN
Status: DISCONTINUED | OUTPATIENT
Start: 2021-10-01 | End: 2021-10-01

## 2021-10-01 RX ORDER — KETOROLAC TROMETHAMINE 30 MG/ML
INJECTION, SOLUTION INTRAMUSCULAR; INTRAVENOUS PRN
Status: DISCONTINUED | OUTPATIENT
Start: 2021-10-01 | End: 2021-10-01

## 2021-10-01 RX ORDER — SODIUM CHLORIDE, SODIUM LACTATE, POTASSIUM CHLORIDE, CALCIUM CHLORIDE 600; 310; 30; 20 MG/100ML; MG/100ML; MG/100ML; MG/100ML
INJECTION, SOLUTION INTRAVENOUS CONTINUOUS
Status: DISCONTINUED | OUTPATIENT
Start: 2021-10-01 | End: 2021-10-01 | Stop reason: HOSPADM

## 2021-10-01 RX ORDER — MIDAZOLAM HYDROCHLORIDE 2 MG/ML
20 SYRUP ORAL ONCE
Status: COMPLETED | OUTPATIENT
Start: 2021-10-01 | End: 2021-10-01

## 2021-10-01 RX ORDER — ONDANSETRON 2 MG/ML
INJECTION INTRAMUSCULAR; INTRAVENOUS PRN
Status: DISCONTINUED | OUTPATIENT
Start: 2021-10-01 | End: 2021-10-01

## 2021-10-01 RX ADMIN — DEXAMETHASONE SODIUM PHOSPHATE 4 MG: 4 INJECTION, SOLUTION INTRAMUSCULAR; INTRAVENOUS at 09:42

## 2021-10-01 RX ADMIN — PROPOFOL 200 MG: 10 INJECTION, EMULSION INTRAVENOUS at 09:36

## 2021-10-01 RX ADMIN — ONDANSETRON 4 MG: 2 INJECTION INTRAMUSCULAR; INTRAVENOUS at 09:42

## 2021-10-01 RX ADMIN — Medication 5 MG: at 09:41

## 2021-10-01 RX ADMIN — PROPOFOL 25 MG: 10 INJECTION, EMULSION INTRAVENOUS at 09:47

## 2021-10-01 RX ADMIN — INFLUENZA A VIRUS A/GUANGDONG-MAONAN/SWL1536/2019 CNIC-1909 (H1N1) ANTIGEN (FORMALDEHYDE INACTIVATED), INFLUENZA A VIRUS A/HONG KONG/2671/2019 (H3N2) ANTIGEN (FORMALDEHYDE INACTIVATED), INFLUENZA B VIRUS B/PHUKET/3073/2013 ANTIGEN (FORMALDEHYDE INACTIVATED), AND INFLUENZA B VIRUS B/WASHINGTON/02/2019 ANTIGEN (FORMALDEHYDE INACTIVATED) 0.5 ML: 15; 15; 15; 15 INJECTION, SUSPENSION INTRAMUSCULAR at 09:43

## 2021-10-01 RX ADMIN — SODIUM CHLORIDE, POTASSIUM CHLORIDE, SODIUM LACTATE AND CALCIUM CHLORIDE: 600; 310; 30; 20 INJECTION, SOLUTION INTRAVENOUS at 09:36

## 2021-10-01 RX ADMIN — KETOROLAC TROMETHAMINE 30 MG: 30 INJECTION, SOLUTION INTRAMUSCULAR at 09:49

## 2021-10-01 RX ADMIN — PROPOFOL 25 MG: 10 INJECTION, EMULSION INTRAVENOUS at 09:42

## 2021-10-01 RX ADMIN — MIDAZOLAM HYDROCHLORIDE 20 MG: 2 SYRUP ORAL at 09:09

## 2021-10-01 ASSESSMENT — MIFFLIN-ST. JEOR: SCORE: 1446.24

## 2021-10-01 NOTE — ANESTHESIA PROCEDURE NOTES
Airway       Patient location during procedure: OR       Procedure Start/Stop Times: 10/1/2021 9:37 AM  Staff -        CRNA: Kallie Andujar APRN CRNA       Performed By: CRNA  Consent for Airway        Urgency: elective  Indications and Patient Condition       Indications for airway management: tania-procedural       Induction type:intravenous       Mask difficulty assessment: 1 - vent by mask    Final Airway Details       Final airway type: supraglottic airway    Supraglottic Airway Details        Type: LMA       Brand: LMA Unique       LMA size: 4    Post intubation assessment        Placement verified by: capnometry, equal breath sounds and chest rise        Number of attempts at approach: 1       Secured with: silk tape       Ease of procedure: easy       Dentition: Unchanged

## 2021-10-01 NOTE — OP NOTE
[facility]    Gynecology Operative Note    Pre-operative diagnosis: 1. Unable to tolerate breast exam, pelvic exam, pap  smear, and blood tests without anesthesia  2. Hydradenitis suppurativa    Post-operative diagnosis Same   Procedure: Procedure(s):  EXAM UNDER ANESTHESIA, PELVIS, pap smear, breast exam, administration of flu vaccine, expression of comedos, blood tests, administration of flu vaccine   Surgeon: Dr. Haily Taylor   Assistants(s): Lynn Cardoza MSKylee   Anesthesia: MAC with LMA   Estimated blood loss: None                    Specimens: Blood tests, PAP   Findings: Breasts: normal without suspicious masses, skin significant for scarring and numerous comedos under the breasts bilaterally. No axillary lymphadenopathy.  Pelvic exam: normal vagina and vulva, normal cervix without lesions or tenderness, uterus normal size anteverted, adenxa normal in size without tenderness, pap smear done, exam chaperoned by nurse. Pap smear was performed.  Blackhead (comedo) lesions and scarring in groin and under breasts bilaterally.   Complications: None   Condition: Stable   Procedure in detail:  Prior to the procedure, risks, benefits, and alternatives were reviewed with the patient and her family. Affirmation of consent was signed. Brief was performed with Anesthesia and OR staff and patient was brought to the OR. Under nitrous gas, the IV was placed and then patient was placed under MAC anesthesia. Lab tests were drawn from the IV site. Flu vaccine was administered. Patient was placed in the frog-leg supine position. 10-12 deep blackhead lesions were expressed. Medium Kobe speculum was used to perform the PAP smear. Bimanual exam was performed. Attention was turned to her breasts and exam was performed as noted. Seven blackhead lesions were similarly expressed. Debrief was performed. She went to recovery in excellent condition.    Lynn Castillojayant, SAIRA  I was  present and scrubbed for the entire procedure and the medical student acted as my scribe.   Haily Taylor MD

## 2021-10-01 NOTE — ANESTHESIA CARE TRANSFER NOTE
Patient: Cher Ibarra    Procedure(s):  EXAM UNDER ANESTHESIA, PELVIS, pap smear, breast exam, administration of flu vaccine, excision of blackheads in groin    Diagnosis: Down's syndrome [Q90.9]  Diagnosis Additional Information: No value filed.    Anesthesia Type:   General     Note:    Oropharynx: oropharynx clear of all foreign objects, nasal airway in place and spontaneously breathing  Level of Consciousness: drowsy  Oxygen Supplementation: face mask  Level of Supplemental Oxygen (L/min / FiO2): 8  Independent Airway: airway patency not satisfactory and stable  Dentition: dentition unchanged  Vital Signs Stable: post-procedure vital signs reviewed and stable  Report to RN Given: handoff report given  Patient transferred to: PACU    Handoff Report: Identifed the Patient, Identified the Reponsible Provider, Reviewed the pertinent medical history, Discussed the surgical course, Reviewed Intra-OP anesthesia mangement and issues during anesthesia, Set expectations for post-procedure period and Allowed opportunity for questions and acknowledgement of understanding      Vitals:  Vitals Value Taken Time   BP 91/61 10/01/21 1013   Temp     Pulse 74 10/01/21 1014   Resp 17 10/01/21 1014   SpO2 100 % 10/01/21 1014   Vitals shown include unvalidated device data.    Electronically Signed By: AUBREY Valdes CRNA  October 1, 2021  10:16 AM

## 2021-10-01 NOTE — ANESTHESIA POSTPROCEDURE EVALUATION
Patient: Cher Ibarra    Procedure(s):  EXAM UNDER ANESTHESIA, PELVIS, pap smear, breast exam, administration of flu vaccine, excision of blackheads in groin    Diagnosis:Down's syndrome [Q90.9]  Diagnosis Additional Information: No value filed.    Anesthesia Type:  General    Note:  Disposition: Outpatient   Postop Pain Control: Uneventful            Sign Out: Well controlled pain   PONV: No   Neuro/Psych: Uneventful            Sign Out: Acceptable/Baseline neuro status   Airway/Respiratory: Uneventful            Sign Out: Acceptable/Baseline resp. status   CV/Hemodynamics: Uneventful            Sign Out: Acceptable CV status; No obvious hypovolemia; No obvious fluid overload   Other NRE: NONE   DID A NON-ROUTINE EVENT OCCUR? No    Event details/Postop Comments:  Cher tolerated the procedure well. She was premedicated with PO midazolam, 20 mg, and came back easily to the OR for PIV placement with nitrous. A J-tip was used and she tolerated this very well. At the time of discharge she was awake, up in her chair, tolerating PO, answering questions appropriately. Parents at bedside; all anesthesia related questions answered.            Last vitals:  Vitals Value Taken Time   /88 10/01/21 1045   Temp 37.1  C (98.8  F) 10/01/21 1045   Pulse 74 10/01/21 1049   Resp 23 10/01/21 1049   SpO2 100 % 10/01/21 1049   Vitals shown include unvalidated device data.    Electronically Signed By: Maricruz López MD  October 1, 2021  6:37 PM

## 2021-10-01 NOTE — DISCHARGE INSTRUCTIONS
Small amount vaginal spotting is normal.     Same-Day Surgery   Adult Discharge Orders & Instructions     For 24 hours after surgery:  1. Get plenty of rest.  A responsible adult must stay with you for at least 24 hours after you leave the hospital.   2. Pain medication can slow your reflexes. Do not drive or use heavy equipment.  If you have weakness or tingling, don't drive or use heavy equipment until this feeling goes away.  3. Mixing alcohol and pain medication can cause dizziness and slow your breathing. It can even be fatal. Do not drink alcohol while taking pain medication.  4. Avoid strenuous or risky activities.  Ask for help when climbing stairs.   5. You may feel lightheaded.  If so, sit for a few minutes before standing.  Have someone help you get up.   6. If you have nausea (feel sick to your stomach), drink only clear liquids such as apple juice, ginger ale, broth or 7-Up.  Rest may also help.  Be sure to drink enough fluids.  Move to a regular diet as you feel able. Take pain medications with a small amount of solid food, such as toast or crackers, to avoid nausea.   7. A slight fever is normal. Call the doctor if your fever is over 100 F (37.7 C) (taken under the tongue) or lasts longer than 24 hours.  8. You may have a dry mouth, muscle aches, trouble sleeping or a sore throat.  These symptoms should go away after 24 hours.  9. Do not make important or legal decisions.   Pain Management:      1. Take pain medication (if prescribed) for pain as directed by your physician.        2. WARNING: If the pain medication you have been prescribed contains Tylenol  (acetaminophen), DO NOT take additional doses of Tylenol (acetaminophen).     Call your doctor for any of the followin.  Signs of infection (fever, growing tenderness at the surgery site, severe pain, a large amount of drainage or bleeding, foul-smelling drainage, redness, swelling).    2.  It has been over 8 to 10 hours since surgery and you  are still not able to urinate (pee).    3.  Headache for over 24 hours.    4.  Numbness, tingling or weakness the day after surgery (if you had spinal anesthesia).  To contact a doctor, call _____________________________________ or:      [phone] and ask for the Resident On Call for:          __________________________________________ (answered 24 hours a day)      Emergency Department:  [place] [facility] [facility]: [phone]  [place] [facility]: [phone]

## 2021-10-05 LAB
BKR LAB AP GYN ADEQUACY: NORMAL
BKR LAB AP GYN INTERPRETATION: NORMAL
PATH REPORT.COMMENTS IMP SPEC: NORMAL

## 2021-10-13 ENCOUNTER — VIRTUAL VISIT (OUTPATIENT)
Dept: OBGYN | Facility: CLINIC | Age: 21
End: 2021-10-13
Attending: OBSTETRICS & GYNECOLOGY
Payer: COMMERCIAL

## 2021-10-13 DIAGNOSIS — Q90.9 DOWN'S SYNDROME: ICD-10-CM

## 2021-10-13 PROCEDURE — 99212 OFFICE O/P EST SF 10 MIN: CPT | Mod: TEL | Performed by: OBSTETRICS & GYNECOLOGY

## 2021-10-13 RX ORDER — CHOLECALCIFEROL (VITAMIN D3) 50 MCG
2 TABLET ORAL DAILY
Qty: 180 TABLET | Refills: 3 | Status: SHIPPED | OUTPATIENT
Start: 2021-10-13

## 2021-10-13 NOTE — LETTER
10/13/2021       RE: Cher Ibarra  [address] [facility] [address]     Dear Colleague,    Thank you for referring your patient, Cher Ibarra, to the [facility] [place] at [facility]. Please see a copy of my visit note below.    20 yo P0 with Down Syndrome s/p EUA, pap, labs, breast exam, flu vaccine, and expression of multiple black heads in OR 10/1/21.    Doing well since the procedure without any changes to lifestyle or health  Reviewed results in detail with Safia, patient's mother.    Patient Active Problem List   Diagnosis     Down's syndrome     Sleep disturbance     Chronic rhinitis     Dysmenorrhea     Anxiety disorder     Recurrent boils     Obesity     Trichotillomania     Ganglion cyst of wrist, left     Synovial cyst of ankle and foot region     GERD (gastroesophageal reflux disease)     Morbid obesity (H)     Past Surgical History:   Procedure Laterality Date     AS EXCIS PRIMARY GANGLION WRIST  01/2020    wrist and ankle     ESOPHAGOSCOPY, GASTROSCOPY, DUODENOSCOPY (EGD), COMBINED N/A 12/11/2020    Procedure: ESOPHAGOGASTRODUODENOSCOPY, WITH BIOPSY;  Surgeon: Antolin Tenorio MD;  Location: [facility] OR     EXAM UNDER ANESTHESIA PELVIC N/A 10/1/2021    Procedure: EXAM UNDER ANESTHESIA, PELVIS, pap smear, breast exam, administration of flu vaccine, excision of blackheads in groin;  Surgeon: Haily Taylor MD;  Location: [facility] OR     MAMMOPLASTY REDUCTION       REPAIR ATRIAL SEPTAL DEFECT      age 1     REPAIR VENTRICULAR SEPTAL DEFECT      age 1     TONSILLECTOMY & ADENOIDECTOMY      AGE 3       Telemedicine Visit: The patient's condition can be safely assessed and treated in telemedicine encounter.      Reason for Telemedicine Visit: Patient has requested telehealth visit COVID 19    Originating Site (Patient Location): Patient's home    Distant Site (Provider Location): [facility]: [facility]    Consent:   The patient/guardian has verbally consented to: the potential risks and benefits of telemedicine versus in person care; bill my insurance or make self-payment for services provided; and responsibility for payment of non-covered services.     Mode of Communication:  Phone    As the provider I attest to compliance with applicable laws and regulations related to telemedicine.    15 minutes spent in phone call visit.     Haily Taylor MD

## 2021-10-13 NOTE — PROGRESS NOTES
22 yo P0 with Down Syndrome s/p EUA, pap, labs, breast exam, flu vaccine, and expression of multiple black heads in OR 10/1/21.    Doing well since the procedure without any changes to lifestyle or health  Reviewed results in detail with Safia, patient's mother.    Patient Active Problem List   Diagnosis     Down's syndrome     Sleep disturbance     Chronic rhinitis     Dysmenorrhea     Anxiety disorder     Recurrent boils     Obesity     Trichotillomania     Ganglion cyst of wrist, left     Synovial cyst of ankle and foot region     GERD (gastroesophageal reflux disease)     Morbid obesity (H)     Past Surgical History:   Procedure Laterality Date     AS EXCIS PRIMARY GANGLION WRIST  01/2020    wrist and ankle     ESOPHAGOSCOPY, GASTROSCOPY, DUODENOSCOPY (EGD), COMBINED N/A 12/11/2020    Procedure: ESOPHAGOGASTRODUODENOSCOPY, WITH BIOPSY;  Surgeon: Antolin Tenorio MD;  [facility]     EXAM UNDER ANESTHESIA PELVIC N/A 10/1/2021    Procedure: EXAM UNDER ANESTHESIA, PELVIS, pap smear, breast exam, administration of flu vaccine, excision of blackheads in groin;  Surgeon: Haily Taylor MD;  [facility]     MAMMOPLASTY REDUCTION       REPAIR ATRIAL SEPTAL DEFECT      age 1     REPAIR VENTRICULAR SEPTAL DEFECT      age 1     TONSILLECTOMY & ADENOIDECTOMY      AGE 3       Telemedicine Visit: The patient's condition can be safely assessed and treated in telemedicine encounter.      Reason for Telemedicine Visit: Patient has requested telehealth visit COVID 19    Originating Site (Patient Location): Patient's home    Distant Site (Provider Location): [facility]: [facility]    Consent:  The patient/guardian has verbally consented to: the potential risks and benefits of telemedicine versus in person care; bill my insurance or make self-payment for services provided; and responsibility for payment of non-covered services.     Mode of Communication:  Phone    As the provider I attest to compliance with  applicable laws and regulations related to telemedicine.    15 minutes spent in phone call visit.     Haily Taylor MD

## 2021-12-29 ENCOUNTER — MYC MEDICAL ADVICE (OUTPATIENT)
Dept: FAMILY MEDICINE | Facility: CLINIC | Age: 21
End: 2021-12-29
Payer: COMMERCIAL

## 2022-01-09 ENCOUNTER — HEALTH MAINTENANCE LETTER (OUTPATIENT)
Age: 22
End: 2022-01-09

## 2022-02-13 ENCOUNTER — MYC MEDICAL ADVICE (OUTPATIENT)
Dept: FAMILY MEDICINE | Facility: CLINIC | Age: 22
End: 2022-02-13
Payer: COMMERCIAL

## 2022-02-13 DIAGNOSIS — Z30.9 ENCOUNTER FOR CONTRACEPTIVE MANAGEMENT, UNSPECIFIED TYPE: ICD-10-CM

## 2022-02-13 RX ORDER — LEVONORGESTREL AND ETHINYL ESTRADIOL 0.15-0.03
1 KIT ORAL DAILY
Qty: 90 TABLET | Refills: 1 | Status: SHIPPED | OUTPATIENT
Start: 2022-02-13 | End: 2022-08-03

## 2022-02-13 NOTE — TELEPHONE ENCOUNTER
[facility] pharmacy calling for refill of birth control pills.     Disp Refills Start End PAOLA    levonorgestrel-ethinyl estradiol (INTROVALE) 0.15-0.03 MG tablet 91 tablet 2 4/7/2021  No   Sig - Route: Take 1 tablet by mouth daily - Oral   Sent to pharmacy as: Levonorgest-Eth Estrad 91-Day 0.15-0.03 MG Oral Tablet (Introvale)   Class: E-Prescribe   Order: [id number]   E-Prescribing Status: Receipt confirmed by pharmacy (4/7/2021  9:01 AM CDT)          RN asked for assistance from Bev Shea RN. See above note. Bev sent in refill, 6mo supply.     Jes White RN   02/13/22 10:50 AM  [facility] Nurse Advisor

## 2022-02-13 NOTE — TELEPHONE ENCOUNTER
"Last Written Prescription Date: 4/7/21, refill approved per protocol Patient had office visit on 9/22/21. RN assisted Jes MURRY with refilling medication for patient.   Last Fill Quantity: 91,  # refills: 2  Last office visit provider:  9/22/21     Requested Prescriptions   Pending Prescriptions Disp Refills     levonorgestrel-ethinyl estradiol (INTROVALE) 0.15-0.03 MG tablet 91 tablet 2     Sig: Take 1 tablet by mouth daily       Contraceptives Protocol Failed - 2/13/2022 10:47 AM        Failed - Recent (12 mo) or future (30 days) visit within the authorizing provider's specialty     Patient has had an office visit with the authorizing provider or a provider within the authorizing providers department within the previous 12 mos or has a future within next 30 days. See \"Patient Info\" tab in inbasket, or \"Choose Columns\" in Meds & Orders section of the refill encounter.              Passed - Patient is not a current smoker if age is 35 or older        Passed - Medication is active on med list        Passed - No active pregnancy on record        Passed - No positive pregnancy test in past 12 months             Bev Oconnor RN 02/13/22 10:47 AM  "

## 2022-02-14 RX ORDER — LEVONORGESTREL AND ETHINYL ESTRADIOL 0.15-0.03
1 KIT ORAL DAILY
Qty: 90 TABLET | Refills: 1 | Status: CANCELLED | OUTPATIENT
Start: 2022-02-14

## 2022-02-14 RX ORDER — LEVONORGESTREL AND ETHINYL ESTRADIOL 0.15-0.03
KIT ORAL
Qty: 91 TABLET | Refills: 1 | OUTPATIENT
Start: 2022-02-14

## 2022-02-24 ENCOUNTER — E-VISIT (OUTPATIENT)
Dept: FAMILY MEDICINE | Facility: CLINIC | Age: 22
End: 2022-02-24
Payer: COMMERCIAL

## 2022-02-24 DIAGNOSIS — N39.0 ACUTE UTI (URINARY TRACT INFECTION): Primary | ICD-10-CM

## 2022-02-24 PROCEDURE — 99421 OL DIG E/M SVC 5-10 MIN: CPT | Performed by: NURSE PRACTITIONER

## 2022-02-24 RX ORDER — NITROFURANTOIN 25; 75 MG/1; MG/1
100 CAPSULE ORAL 2 TIMES DAILY
Qty: 10 CAPSULE | Refills: 0 | Status: SHIPPED | OUTPATIENT
Start: 2022-02-24 | End: 2022-03-01

## 2022-02-24 NOTE — PATIENT INSTRUCTIONS
Dear Cher Ibarra    After reviewing your responses, I've been able to diagnose you with a urinary tract infection, which is a common infection of the bladder with bacteria.  This is not a sexually transmitted infection, though urinating immediately after intercourse can help prevent infections.  Drinking lots of fluids is also helpful to clear your current infection and prevent the next one.      I have sent a prescription for antibiotics to your pharmacy to treat this infection.    It is important that you take all of your prescribed medication even if your symptoms are improving after a few doses.  Taking all of your medicine helps prevent the symptoms from returning.     If your symptoms worsen, you develop pain in your back or stomach, develop fevers, or are not improving in 5 days, please contact your primary care provider for an appointment or visit any of our convenient Walk-in or Urgent Care Centers to be seen, which can be found on our website here.    Thanks again for choosing us as your health care partner,    AUBREY Swan CNP

## 2022-02-25 ENCOUNTER — MYC REFILL (OUTPATIENT)
Dept: FAMILY MEDICINE | Facility: CLINIC | Age: 22
End: 2022-02-25
Payer: COMMERCIAL

## 2022-02-25 DIAGNOSIS — F41.1 GENERALIZED ANXIETY DISORDER: ICD-10-CM

## 2022-02-25 RX ORDER — LORAZEPAM 0.5 MG/1
0.5 TABLET ORAL EVERY 6 HOURS PRN
Qty: 20 TABLET | Refills: 0 | Status: SHIPPED | OUTPATIENT
Start: 2022-02-25 | End: 2023-05-17

## 2022-03-01 DIAGNOSIS — N39.0 ACUTE UTI (URINARY TRACT INFECTION): ICD-10-CM

## 2022-03-01 RX ORDER — NITROFURANTOIN 25; 75 MG/1; MG/1
CAPSULE ORAL
OUTPATIENT
Start: 2022-03-01

## 2022-04-19 DIAGNOSIS — R19.5 HARD STOOL: ICD-10-CM

## 2022-04-21 RX ORDER — DOCUSATE SODIUM 100 MG/1
CAPSULE, LIQUID FILLED ORAL
Qty: 90 CAPSULE | Refills: 1 | Status: SHIPPED | OUTPATIENT
Start: 2022-04-21 | End: 2023-01-06

## 2022-04-21 NOTE — TELEPHONE ENCOUNTER
Prescription approved per [facility] Refill Protocol.    Virginia Nguyen RN

## 2022-06-14 ENCOUNTER — OFFICE VISIT (OUTPATIENT)
Dept: PODIATRY | Facility: CLINIC | Age: 22
End: 2022-06-14
Payer: COMMERCIAL

## 2022-06-14 ENCOUNTER — MYC MEDICAL ADVICE (OUTPATIENT)
Dept: FAMILY MEDICINE | Facility: CLINIC | Age: 22
End: 2022-06-14
Payer: COMMERCIAL

## 2022-06-14 VITALS — BODY MASS INDEX: 34.73 KG/M2 | HEART RATE: 96 BPM | OXYGEN SATURATION: 98 % | WEIGHT: 170 LBS

## 2022-06-14 DIAGNOSIS — F63.3 TRICHOTILLOMANIA: Primary | ICD-10-CM

## 2022-06-14 DIAGNOSIS — B35.9 TINEA: Primary | ICD-10-CM

## 2022-06-14 PROBLEM — M21.40 PES PLANUS: Status: ACTIVE | Noted: 2018-11-09

## 2022-06-14 PROBLEM — L02.92 FURUNCLE: Status: ACTIVE | Noted: 2022-06-14

## 2022-06-14 PROBLEM — M21.6X2 PRONATION OF BOTH FEET: Status: ACTIVE | Noted: 2018-11-09

## 2022-06-14 PROBLEM — G47.00 PERSISTENT INSOMNIA: Status: ACTIVE | Noted: 2022-06-14

## 2022-06-14 PROBLEM — L73.9 FOLLICULITIS: Status: ACTIVE | Noted: 2022-06-14

## 2022-06-14 PROBLEM — F40.218: Status: ACTIVE | Noted: 2022-06-14

## 2022-06-14 PROBLEM — F40.10 SOCIAL PHOBIA: Status: ACTIVE | Noted: 2022-06-14

## 2022-06-14 PROBLEM — L73.2 HIDRADENITIS SUPPURATIVA: Status: ACTIVE | Noted: 2018-11-09

## 2022-06-14 PROBLEM — M67.432 GANGLION CYST OF VOLAR ASPECT OF LEFT WRIST: Status: ACTIVE | Noted: 2020-01-08

## 2022-06-14 PROBLEM — M67.471 GANGLION CYST OF RIGHT FOOT: Status: ACTIVE | Noted: 2020-01-08

## 2022-06-14 PROBLEM — E88.89 CYP2D6 POOR METABOLIZER (H): Status: ACTIVE | Noted: 2019-02-06

## 2022-06-14 PROBLEM — M21.6X1 PRONATION OF BOTH FEET: Status: ACTIVE | Noted: 2018-11-09

## 2022-06-14 PROCEDURE — 99204 OFFICE O/P NEW MOD 45 MIN: CPT | Performed by: PODIATRIST

## 2022-06-14 RX ORDER — TERBINAFINE HYDROCHLORIDE 250 MG/1
250 TABLET ORAL DAILY
Qty: 30 TABLET | Refills: 0 | Status: SHIPPED | OUTPATIENT
Start: 2022-06-14 | End: 2022-12-12

## 2022-06-14 NOTE — PATIENT INSTRUCTIONS
We wish you continued good healing. If you have any questions or concerns, please do not hesitate to contact us at  [phone]    2housest (secure e-mail communication and access to your chart) to send a message or to make an appointment.    Please remember to call and schedule a follow up appointment if one was recommended at your earliest convenience.     PODIATRY CLINIC HOURS  TELEPHONE NUMBER    Dr. Antolin QUINTANAPTANIA [facility]        Clinics:  [place] Villar CMA   Tuesday 1PM-6PM  [place]  Wednesday 745AM-330PM  [place]  Thursday/Friday 745AM-230PM  [place] [facility] APPOINTMENTS  [phone]    [place] APPOINTMENTS  [phone]        If you need a medication refill, please contact us you may need lab work and/or a follow up visit prior to your refill (i.e. Antifungal medications).  If MRI needed please call Imaging at [phone] or [phone]  HOW DO I GET MY KNEE SCOOTER? Knee scooters can be picked up at ANY Medical Supply stores with your knee scooter Prescription.  OR  Bring your signed prescription to an [facility] showroom.

## 2022-06-14 NOTE — LETTER
6/14/2022         RE: Cher Ibarra  [address] [facility] [address]        Dear Colleague,    Thank you for referring your patient, Cher Ibarra, to the [facility]. Please see a copy of my visit note below.    Patient complains of a rash on left foot.  Has had this for at least 1 year.  It is slowly getting worse.  Has itching with this and peeling skin.  Points to plantar forefoot.  Denies drainage.  No rash contralateral foot.  Have tried topical antifungals.   Patient has Down syndrome and lives with her mother.  Mother has onychomycosis and rash on 1 foot as well.    ROS:  See above         Allergies   Allergen Reactions     Azithromycin Hives     Codeine      Hydrocodone      Oxycodone      Seasonal Allergies      Tramadol      Zithromax [Azithromycin Dihydrate]        Current Outpatient Medications   Medication Sig Dispense Refill     acetylcysteine (N-ACETYL CYSTEINE) 500 MG CAPS capsule 500 mg       ammonium lactate (LAC-HYDRIN) 12 % cream Apply topically 2 times daily as needed for dry skin       cetirizine (ZYRTEC) 10 MG tablet Take 1 tablet (10 mg) by mouth every evening 90 tablet 1     Cholecalciferol (VITAMIN D3) 50 MCG (2000 UT) TABS TAKE 1 TABLET BY MOUTH DAILY 90 tablet 1     docusate sodium (COLACE) 100 MG capsule TAKE ONE CAPSULE BY MOUTH EVERY DAY 90 capsule 1     fluticasone (FLONASE) 50 MCG/ACT nasal spray Spray 1-2 sprays into both nostrils daily 16 mL 3     folic acid (FOLVITE) 1 MG tablet TAKE 1 TABLET(1 MG) BY MOUTH DAILY 90 tablet 1     levonorgestrel-ethinyl estradiol (INTROVALE) 0.15-0.03 MG tablet Take 1 tablet by mouth daily 90 tablet 1     lidocaine-prilocaine (LIDOPRIL) 2.5-2.5 % kit Use as directed 1 kit 0     LORazepam (ATIVAN) 0.5 MG tablet Take 1 tablet (0.5 mg) by mouth every 6 hours as needed for anxiety 20 tablet 0     melatonin 5 MG tablet None Entered       mupirocin (BACTROBAN) 2 % external ointment APPLY EXTERNALLY TO THE  AFFECTED AREA THREE TIMES DAILY AS NEEDED 22 g 0     naproxen (NAPROSYN) 500 MG tablet Take 1 tablet (500 mg) by mouth 2 times daily as needed 60 tablet 1     Omega-3 Fatty Acids (OMEGA-3 FISH OIL PO) Take 1 g by mouth daily       terbinafine (LAMISIL) 250 MG tablet Take 1 tablet (250 mg) by mouth daily 30 tablet 0     vitamin D3 (CHOLECALCIFEROL) 50 mcg (2000 units) tablet Take 2 tablets (100 mcg) by mouth daily 180 tablet 3     clotrimazole (LOTRIMIN) 1 % external cream Apply topically every morning 30 g 1     triamcinolone (ARISTOCORT HP) 0.5 % external cream APPLY EXTERNALLY TO THE AFFECTED AREA TWICE DAILY AS DIRECTED 30 g 0       Patient Active Problem List   Diagnosis     Down's syndrome     Sleep disturbance     Chronic rhinitis     Dysmenorrhea     Anxiety disorder     Recurrent boils     Obesity     Trichotillomania     Ganglion cyst of wrist, left     Synovial cyst of ankle and foot region     GERD (gastroesophageal reflux disease)     Morbid obesity (H)     Zoophobia     Social phobia     Pronation of both feet     Pes planus     Persistent insomnia     Hidradenitis suppurativa     Ganglion cyst of volar aspect of left wrist     Ganglion cyst of right foot     Furuncle     Folliculitis     CYP2D6 poor metabolizer (H)       Past Medical History:   Diagnosis Date     Anxiety      ASD (atrial septal defect) 1998    Repair of ASD/VSD     Chronic rhinitis      Down syndrome     global developmental delay     GERD (gastroesophageal reflux disease)      Keratosis pilaris      Obesity 03/05/2018     Recurrent boils      Strabismus      Trichotillomania        Past Surgical History:   Procedure Laterality Date     AS EXCIS PRIMARY GANGLION WRIST  01/2020    wrist and ankle     ESOPHAGOSCOPY, GASTROSCOPY, DUODENOSCOPY (EGD), COMBINED N/A 12/11/2020    Procedure: ESOPHAGOGASTRODUODENOSCOPY, WITH BIOPSY;  Surgeon: Antolin Tenorio MD;  Location: [facility] OR     EXAM UNDER ANESTHESIA PELVIC N/A 10/1/2021    Procedure:  EXAM UNDER ANESTHESIA, PELVIS, pap smear, breast exam, administration of flu vaccine, excision of blackheads in groin;  Surgeon: Haily Taylor MD;  [facility] OR     MAMMOPLASTY REDUCTION       REPAIR ATRIAL SEPTAL DEFECT      age 1     REPAIR VENTRICULAR SEPTAL DEFECT      age 1     TONSILLECTOMY & ADENOIDECTOMY      AGE 3       Family History   Problem Relation Age of Onset     Cerebrovascular Disease Mother      Depression Mother      Anxiety Disorder Mother      Obesity Mother      Hyperlipidemia Mother      Asthma Mother      Anesthesia Reaction Maternal Grandmother      Prostate Cancer Maternal Grandfather      Other Cancer Maternal Grandfather      Diabetes Other      Substance Abuse Other      C.A.D. No family hx of      Hypertension No family hx of      Breast Cancer No family hx of        Social History     Tobacco Use     Smoking status: Never Smoker     Smokeless tobacco: Never Used   Substance Use Topics     Alcohol use: No         Exam:    Vitals: Pulse 96   Wt 77.1 kg (170 lb)   SpO2 98%   BMI 34.73 kg/m    BMI: Body mass index is 34.73 kg/m .  Height: Data Unavailable    Constitutional/ general:  Pt is in no apparent distress, appears well-nourished.  Cooperative with history and physical exam.  Patient seen with mother today    Psych:  The patient answered questions appropriately.  Normal affect.  Seems to have reasonable expectations, in terms of treatment.     Vascular: Palpable pulses.  Positive ankle edema.  Hair growth noted on feet.    Neuro:  Alert and oriented x 3.  Light touch intact    Musculoskeletal:    Lower extremity muscle strength is normal.  Bunion deformity noted.    Derm: Normal texture and turgor.  No erythema, ecchymosis, or cyanosis.  Left forefoot plantar has dry, peeling, erythematous skin.  No vesicles.    A/P  left tinea pedis     Discussed all options with patient.  Prescription written for Lamisil 250 mg 1 p.o. daily for 1 month.  Discussed with patient and  mother if no resolution will refer to dermatology.  Return to clinic prn.    Antolin Mojica DPM, FACFAS          Again, thank you for allowing me to participate in the care of your patient.        Sincerely,        Antolin Mojica DPM

## 2022-06-15 NOTE — PROGRESS NOTES
Patient complains of a rash on left foot.  Has had this for at least 1 year.  It is slowly getting worse.  Has itching with this and peeling skin.  Points to plantar forefoot.  Denies drainage.  No rash contralateral foot.  Have tried topical antifungals.   Patient has Down syndrome and lives with her mother.  Mother has onychomycosis and rash on 1 foot as well.    ROS:  See above         Allergies   Allergen Reactions     Azithromycin Hives     Codeine      Hydrocodone      Oxycodone      Seasonal Allergies      Tramadol      Zithromax [Azithromycin Dihydrate]        Current Outpatient Medications   Medication Sig Dispense Refill     acetylcysteine (N-ACETYL CYSTEINE) 500 MG CAPS capsule 500 mg       ammonium lactate (LAC-HYDRIN) 12 % cream Apply topically 2 times daily as needed for dry skin       cetirizine (ZYRTEC) 10 MG tablet Take 1 tablet (10 mg) by mouth every evening 90 tablet 1     Cholecalciferol (VITAMIN D3) 50 MCG (2000 UT) TABS TAKE 1 TABLET BY MOUTH DAILY 90 tablet 1     docusate sodium (COLACE) 100 MG capsule TAKE ONE CAPSULE BY MOUTH EVERY DAY 90 capsule 1     fluticasone (FLONASE) 50 MCG/ACT nasal spray Spray 1-2 sprays into both nostrils daily 16 mL 3     folic acid (FOLVITE) 1 MG tablet TAKE 1 TABLET(1 MG) BY MOUTH DAILY 90 tablet 1     levonorgestrel-ethinyl estradiol (INTROVALE) 0.15-0.03 MG tablet Take 1 tablet by mouth daily 90 tablet 1     lidocaine-prilocaine (LIDOPRIL) 2.5-2.5 % kit Use as directed 1 kit 0     LORazepam (ATIVAN) 0.5 MG tablet Take 1 tablet (0.5 mg) by mouth every 6 hours as needed for anxiety 20 tablet 0     melatonin 5 MG tablet None Entered       mupirocin (BACTROBAN) 2 % external ointment APPLY EXTERNALLY TO THE AFFECTED AREA THREE TIMES DAILY AS NEEDED 22 g 0     naproxen (NAPROSYN) 500 MG tablet Take 1 tablet (500 mg) by mouth 2 times daily as needed 60 tablet 1     Omega-3 Fatty Acids (OMEGA-3 FISH OIL PO) Take 1 g by mouth daily       terbinafine (LAMISIL) 250 MG  tablet Take 1 tablet (250 mg) by mouth daily 30 tablet 0     vitamin D3 (CHOLECALCIFEROL) 50 mcg (2000 units) tablet Take 2 tablets (100 mcg) by mouth daily 180 tablet 3     clotrimazole (LOTRIMIN) 1 % external cream Apply topically every morning 30 g 1     triamcinolone (ARISTOCORT HP) 0.5 % external cream APPLY EXTERNALLY TO THE AFFECTED AREA TWICE DAILY AS DIRECTED 30 g 0       Patient Active Problem List   Diagnosis     Down's syndrome     Sleep disturbance     Chronic rhinitis     Dysmenorrhea     Anxiety disorder     Recurrent boils     Obesity     Trichotillomania     Ganglion cyst of wrist, left     Synovial cyst of ankle and foot region     GERD (gastroesophageal reflux disease)     Morbid obesity (H)     Zoophobia     Social phobia     Pronation of both feet     Pes planus     Persistent insomnia     Hidradenitis suppurativa     Ganglion cyst of volar aspect of left wrist     Ganglion cyst of right foot     Furuncle     Folliculitis     CYP2D6 poor metabolizer (H)       Past Medical History:   Diagnosis Date     Anxiety      ASD (atrial septal defect) 1998    Repair of ASD/VSD     Chronic rhinitis      Down syndrome     global developmental delay     GERD (gastroesophageal reflux disease)      Keratosis pilaris      Obesity 03/05/2018     Recurrent boils      Strabismus      Trichotillomania        Past Surgical History:   Procedure Laterality Date     AS EXCIS PRIMARY GANGLION WRIST  01/2020    wrist and ankle     ESOPHAGOSCOPY, GASTROSCOPY, DUODENOSCOPY (EGD), COMBINED N/A 12/11/2020    Procedure: ESOPHAGOGASTRODUODENOSCOPY, WITH BIOPSY;  Surgeon: Antolin Tenorio MD;  Location: [facility] OR     EXAM UNDER ANESTHESIA PELVIC N/A 10/1/2021    Procedure: EXAM UNDER ANESTHESIA, PELVIS, pap smear, breast exam, administration of flu vaccine, excision of blackheads in groin;  Surgeon: Haily Taylor MD;  [facility]: [facility]     MAMMOPLASTY REDUCTION       REPAIR ATRIAL SEPTAL DEFECT      age 1     REPAIR  VENTRICULAR SEPTAL DEFECT      age 1     TONSILLECTOMY & ADENOIDECTOMY      AGE 3       Family History   Problem Relation Age of Onset     Cerebrovascular Disease Mother      Depression Mother      Anxiety Disorder Mother      Obesity Mother      Hyperlipidemia Mother      Asthma Mother      Anesthesia Reaction Maternal Grandmother      Prostate Cancer Maternal Grandfather      Other Cancer Maternal Grandfather      Diabetes Other      Substance Abuse Other      C.A.D. No family hx of      Hypertension No family hx of      Breast Cancer No family hx of        Social History     Tobacco Use     Smoking status: Never Smoker     Smokeless tobacco: Never Used   Substance Use Topics     Alcohol use: No         Exam:    Vitals: Pulse 96   Wt 77.1 kg (170 lb)   SpO2 98%   BMI 34.73 kg/m    BMI: Body mass index is 34.73 kg/m .  Height: Data Unavailable    Constitutional/ general:  Pt is in no apparent distress, appears well-nourished.  Cooperative with history and physical exam.  Patient seen with mother today    Psych:  The patient answered questions appropriately.  Normal affect.  Seems to have reasonable expectations, in terms of treatment.     Vascular: Palpable pulses.  Positive ankle edema.  Hair growth noted on feet.    Neuro:  Alert and oriented x 3.  Light touch intact    Musculoskeletal:    Lower extremity muscle strength is normal.  Bunion deformity noted.    Derm: Normal texture and turgor.  No erythema, ecchymosis, or cyanosis.  Left forefoot plantar has dry, peeling, erythematous skin.  No vesicles.    A/P  left tinea pedis     Discussed all options with patient.  Prescription written for Lamisil 250 mg 1 p.o. daily for 1 month.  Discussed with patient and mother if no resolution will refer to dermatology.  Return to clinic prn.    Antolin Mojica DPM, FACFAS

## 2022-07-08 ENCOUNTER — MYC MEDICAL ADVICE (OUTPATIENT)
Dept: FAMILY MEDICINE | Facility: CLINIC | Age: 22
End: 2022-07-08

## 2022-07-08 DIAGNOSIS — Z87.2: ICD-10-CM

## 2022-07-08 RX ORDER — MUPIROCIN 20 MG/G
OINTMENT TOPICAL
Qty: 22 G | Refills: 0 | Status: SHIPPED | OUTPATIENT
Start: 2022-07-08

## 2022-07-19 ENCOUNTER — MYC MEDICAL ADVICE (OUTPATIENT)
Dept: FAMILY MEDICINE | Facility: CLINIC | Age: 22
End: 2022-07-19

## 2022-08-03 DIAGNOSIS — Z30.9 ENCOUNTER FOR CONTRACEPTIVE MANAGEMENT, UNSPECIFIED TYPE: ICD-10-CM

## 2022-08-03 RX ORDER — LEVONORGESTREL AND ETHINYL ESTRADIOL 0.15-0.03
KIT ORAL
Qty: 91 TABLET | Refills: 1 | Status: SHIPPED | OUTPATIENT
Start: 2022-08-03 | End: 2023-02-11

## 2022-08-03 NOTE — TELEPHONE ENCOUNTER
"Requested Prescriptions   Signed Prescriptions Disp Refills    levonorgestrel-ethinyl estradiol (SEASONALE) 0.15-0.03 MG tablet 91 tablet 1     Sig: TAKE ONE TABLET BY MOUTH EVERY DAY       Contraceptives Protocol Passed - 8/3/2022  9:25 AM        Passed - Patient is not a current smoker if age is 35 or older        Passed - Recent (12 mo) or future (30 days) visit within the authorizing provider's specialty     Patient has had an office visit with the authorizing provider or a provider within the authorizing providers department within the previous 12 mos or has a future within next 30 days. See \"Patient Info\" tab in inbasket, or \"Choose Columns\" in Meds & Orders section of the refill encounter.              Passed - Medication is active on med list        Passed - No active pregnancy on record        Passed - No positive pregnancy test in past 12 months           Nadege Kim RN  [facility]     "

## 2022-08-31 ENCOUNTER — TRANSFERRED RECORDS (OUTPATIENT)
Dept: HEALTH INFORMATION MANAGEMENT | Facility: CLINIC | Age: 22
End: 2022-08-31

## 2022-09-06 ENCOUNTER — MYC MEDICAL ADVICE (OUTPATIENT)
Dept: FAMILY MEDICINE | Facility: CLINIC | Age: 22
End: 2022-09-06

## 2022-09-06 DIAGNOSIS — Q90.9 DOWN'S SYNDROME: Primary | ICD-10-CM

## 2022-09-14 ENCOUNTER — MYC MEDICAL ADVICE (OUTPATIENT)
Dept: FAMILY MEDICINE | Facility: CLINIC | Age: 22
End: 2022-09-14

## 2022-09-14 DIAGNOSIS — K21.9 GASTROESOPHAGEAL REFLUX DISEASE WITHOUT ESOPHAGITIS: Primary | ICD-10-CM

## 2022-09-16 ENCOUNTER — HOSPITAL ENCOUNTER (OUTPATIENT)
Facility: AMBULATORY SURGERY CENTER | Age: 22
End: 2022-09-16
Attending: INTERNAL MEDICINE
Payer: COMMERCIAL

## 2022-09-16 ENCOUNTER — TELEPHONE (OUTPATIENT)
Dept: GASTROENTEROLOGY | Facility: CLINIC | Age: 22
End: 2022-09-16

## 2022-09-16 DIAGNOSIS — E66.9 OBESITY WITHOUT SERIOUS COMORBIDITY, UNSPECIFIED CLASSIFICATION, UNSPECIFIED OBESITY TYPE: Primary | ICD-10-CM

## 2022-09-16 NOTE — TELEPHONE ENCOUNTER
Screening Questions    BlueKIND OF PREP RedLOCATION [review exclusion criteria] GreenSEDATION TYPE      1. Are you active on mychart? Y    2. What insurance is in the chart? [place]      3.   Ordering/Referring Provider: Paloma Diaz MD     4. BMI   (If greater than 40 review exclusion criteria [PAC APPT IF [MAC] @ [facility])  37.6   [If yes, BMI OVER 40-EXTENDED PREP]      **(Sedation review/consideration needed)**  Do you have a legal guardian or Medical Power of    and/or are you able to give consent for your medical care?     MOM/ALEX DUMONTT: [phone]    5. Have you had a positive covid test in the last 90 days?   N     6.  Are you currently on dialysis?   N [ If yes, G-PREP & HOSPITAL setting ONLY]     7.  Do you have chronic kidney disease?  N [ If yes, G-PREP ]    8.   Do you have a diagnosis of diabetes?   N   [ If yes, G-PREP ]    9.  On a regular basis do you go 3-5 days between bowel movements?   Y - CONSTIPATED    [ If yes, EXTENDED PREP]    10.  Are you taking any prescription pain medications on a routine schedule?    N  [ If yes, EXTENDED PREP] [If yes, MAC]      11.   Do you have any chemical dependencies such as alcohol, street drugs, or methadone?    N [If yes, MAC]    12.   Do you have any history of post-traumatic stress syndrome, severe anxiety or history of psychosis?    Y - ANXIETY   [If yes, MAC]    13.  [FEMALES] Are you currently pregnant? N    If yes, how many weeks?       Respiratory/Heart Screening:  [If yes to any of the following HOSPITAL setting only]     14. Do you have Pulmonary Hypertension [Lungs]?   N       15. Do you have UNCONTROLLED asthma?   N     16.  Do you use daily home oxygen?  N      17. Do you have mod to severe Obstructive Sleep Apnea?         (OKAY @  [facility]  [facility]  [facility]  [facility]  [facility] - if pt is not on OXYGEN)  N      18.   Have you had a heart or lung transplant?   N      19.   Have you had a stroke or Transient ischemic attack (TIA - aka  mini  stroke ) within 6 months?  (If yes, please review exclusion criteria)  N     20.   In the past 6 months, have you had any heart related issues including cardiomyopathy or heart attack?   N           If yes, did it require cardiac stenting or other implantable device?   N      21.   Do you have any implantable devices in your body (pacemaker, defib, LVAD)? (If yes, please review exclusion criteria)  N   22.  Do you take the medication Phentermine?  NO        23. Do you take nitroglycerin?   N           If yes, how often? N  (if yes, HOSPITAL setting ONLY)    24.  Are you currently taking any blood thinners?    [If yes, INFORM patient to follw up w/ ORDERING PROVIDER FOR BRIDGING INSTRUCTIONS]     N    25.   Do you transfer independently?                (If NO, please HOSPITAL setting ONLY)  Y    26.   [facility] [id number] [address]    Scheduling Details  (Please ask for phone number if not scheduled by patient)      Caller : Cher Ibarra       Date of Procedure: 11/11  Surgeon: TYRON   Location: [facility]         Sedation Type: MAC  l ANXIETY   Conscious Sedation- Needs  for 6 hours after the procedure  MAC/General-Needs  for 24 hours after procedure    Y - PT'S MOTHER STATES WILL SCHEDULE WITH PCP.  :[Pre-op Required] at [facility]  [facility] and OR for MAC sedation   (advise patient they will need a pre-op WITH IN 30 DAYS of procedure date)     Type of Procedure Scheduled:   Upper Endoscopy [EGD]    Which Colonoscopy Prep was Sent?:   - - -    [facility] PATIENTS & [facility] PATIENTS NEEDS EXTENDED PREP       Informed patient they will need an adult  Y  Cannot take any type of public or medical transportation alone    Pre-Procedure Covid test to be completed at [facility] or Externally: Y  **INFORMED OF HOME TESTING & LAB OPTION**        Confirmed Nurse will call to complete assessment Y    Additional comments:  N

## 2022-09-20 PROBLEM — E66.01 MORBID OBESITY (H): Status: RESOLVED | Noted: 2020-12-09 | Resolved: 2022-09-20

## 2022-10-12 ENCOUNTER — TELEPHONE (OUTPATIENT)
Dept: GASTROENTEROLOGY | Facility: CLINIC | Age: 22
End: 2022-10-12

## 2022-10-12 DIAGNOSIS — K21.9 GASTROESOPHAGEAL REFLUX DISEASE WITHOUT ESOPHAGITIS: ICD-10-CM

## 2022-10-12 NOTE — TELEPHONE ENCOUNTER
Caller: Nella     Procedure: egd    Date, Location, and Surgeon of Procedure Cancelled: [facility] 11/11/22    Ordering Provider:Paloma salcedo    Reason for cancel (please be detailed, any staff messages or encounters to note?): needs hospital setting  Marina Perez RN  P Endoscopy Scheduling Pool; P [facility]...     Patient is currently scheduled at [facility] (EGD) in [place] on 11/12/2022.     Per chart review by endo lead and anesthesia director, for patient's safety, this procedure needs to be rescheduled in a hospital setting.     I spoke to the patient's mother, Safia, about this. She was understanding and was informed that the scheduling team will call her to reschedule the patient's procedure. The best number to reach Safia: [phone].     Patient is scheduled for a pre-op with PCP on 10/31/2022.     Could someone please reply with her new procedure details?  I need to inform staff about request for vaccines and labs to be done at time of procedure.     Thank you for your help!     Marina ARORA RN   [facility]   Gastroenterology     I left Safia a message to call back and reschedule      Rescheduled: n     If rescheduled:    Date:    Location:    Prep Resent: (changes to prep?)   Covid Test Rescheduled:    Note any change or update to original order/sedation:

## 2022-10-12 NOTE — TELEPHONE ENCOUNTER
Spoke with Safia, patient's mother.   Informed Safia that per chart review by endo RN lead and anesthesia director that patient should be rescheduled to have EGD done in a hospital setting. Reminded Safia that a H&P will be needed within 30 days of procedure. Of note, patient currently scheduled for office visit on 10/31.   Writer informed Safia that the schedulers will reach out to her. Safia confirmed best number to reach her: [phone]. Safia appreciative of phone call.   Writer will send staff message to endoscopy scheduling pool to reach out to patient's mother to reschedule.     Marina Perez, JEANMARIE

## 2022-10-13 ENCOUNTER — TRANSFERRED RECORDS (OUTPATIENT)
Dept: HEALTH INFORMATION MANAGEMENT | Facility: CLINIC | Age: 22
End: 2022-10-13

## 2022-10-13 ENCOUNTER — TELEPHONE (OUTPATIENT)
Dept: GASTROENTEROLOGY | Facility: CLINIC | Age: 22
End: 2022-10-13

## 2022-10-13 NOTE — TELEPHONE ENCOUNTER
Caller: Safia    Procedure: EGD    Date, Location, and Surgeon of Procedure Cancelled: 11/11, Hansa MARADIAGA    Ordering Provider:Paloma Diaz MD     Reason for cancel (please be detailed, any staff messages or encounters to note?): See TE dated 10/12    Rescheduled: Y     If rescheduled:    Date: 12/22   Location:    Prep Resent: YES (changes to prep?)   Covid Test Rescheduled: NO   Note any change or update to original order/sedation: Staff message sent to Marina Perez RN with updated appointment information.

## 2022-10-19 ENCOUNTER — TELEPHONE (OUTPATIENT)
Dept: GASTROENTEROLOGY | Facility: CLINIC | Age: 22
End: 2022-10-19

## 2022-10-19 ENCOUNTER — MYC MEDICAL ADVICE (OUTPATIENT)
Dept: FAMILY MEDICINE | Facility: CLINIC | Age: 22
End: 2022-10-19

## 2022-10-19 DIAGNOSIS — D22.9 ATYPICAL MOLE: Primary | ICD-10-CM

## 2022-11-08 DIAGNOSIS — Q90.9 DOWN'S SYNDROME: Primary | ICD-10-CM

## 2022-11-11 ENCOUNTER — ANCILLARY PROCEDURE (OUTPATIENT)
Dept: GENERAL RADIOLOGY | Facility: CLINIC | Age: 22
End: 2022-11-11
Attending: FAMILY MEDICINE
Payer: COMMERCIAL

## 2022-11-11 ENCOUNTER — MYC MEDICAL ADVICE (OUTPATIENT)
Dept: FAMILY MEDICINE | Facility: CLINIC | Age: 22
End: 2022-11-11

## 2022-11-11 DIAGNOSIS — Q90.9 DOWN'S SYNDROME: ICD-10-CM

## 2022-11-11 PROCEDURE — 72040 X-RAY EXAM NECK SPINE 2-3 VW: CPT | Mod: TC | Performed by: RADIOLOGY

## 2022-11-11 NOTE — TELEPHONE ENCOUNTER
Called and spoke with mom and scheduled preop with PCP. No further concerns at this time.  Marika Altamirano MA

## 2022-11-12 NOTE — RESULT ENCOUNTER NOTE
Sahil,  The neck x-ray looks unchanged. You are safe to participate in sports and activities.     You have mild curvature of the upper spine which is stable.     Paloma Diaz MD

## 2022-11-21 ENCOUNTER — HEALTH MAINTENANCE LETTER (OUTPATIENT)
Age: 22
End: 2022-11-21

## 2022-12-12 ENCOUNTER — OFFICE VISIT (OUTPATIENT)
Dept: FAMILY MEDICINE | Facility: CLINIC | Age: 22
End: 2022-12-12
Payer: COMMERCIAL

## 2022-12-12 VITALS
HEART RATE: 95 BPM | WEIGHT: 188 LBS | TEMPERATURE: 98.3 F | SYSTOLIC BLOOD PRESSURE: 107 MMHG | BODY MASS INDEX: 39.47 KG/M2 | DIASTOLIC BLOOD PRESSURE: 66 MMHG | OXYGEN SATURATION: 97 % | HEIGHT: 58 IN

## 2022-12-12 DIAGNOSIS — Q90.9 DOWN'S SYNDROME: ICD-10-CM

## 2022-12-12 DIAGNOSIS — F63.3 TRICHOTILLOMANIA: ICD-10-CM

## 2022-12-12 DIAGNOSIS — E88.89 CYP2D6 POOR METABOLIZER (H): ICD-10-CM

## 2022-12-12 DIAGNOSIS — Z01.818 PREOP GENERAL PHYSICAL EXAM: Primary | ICD-10-CM

## 2022-12-12 DIAGNOSIS — K21.9 GASTROESOPHAGEAL REFLUX DISEASE WITHOUT ESOPHAGITIS: ICD-10-CM

## 2022-12-12 PROBLEM — M67.471 GANGLION CYST OF RIGHT FOOT: Status: RESOLVED | Noted: 2020-01-08 | Resolved: 2022-12-12

## 2022-12-12 PROBLEM — M71.379 SYNOVIAL CYST OF ANKLE AND FOOT REGION: Status: RESOLVED | Noted: 2020-01-06 | Resolved: 2022-12-12

## 2022-12-12 PROBLEM — L02.92 RECURRENT BOILS: Status: RESOLVED | Noted: 2017-07-05 | Resolved: 2022-12-12

## 2022-12-12 PROBLEM — M67.432 GANGLION CYST OF VOLAR ASPECT OF LEFT WRIST: Status: RESOLVED | Noted: 2020-01-08 | Resolved: 2022-12-12

## 2022-12-12 PROBLEM — Q21.10 ATRIAL SEPTAL DEFECT: Status: ACTIVE | Noted: 2022-12-12

## 2022-12-12 PROBLEM — L02.92 FURUNCLE: Status: RESOLVED | Noted: 2022-06-14 | Resolved: 2022-12-12

## 2022-12-12 PROBLEM — H90.2 CONDUCTIVE HEARING LOSS: Status: ACTIVE | Noted: 2022-12-12

## 2022-12-12 PROBLEM — M67.432 GANGLION CYST OF WRIST, LEFT: Status: RESOLVED | Noted: 2020-01-06 | Resolved: 2022-12-12

## 2022-12-12 PROBLEM — L73.9 FOLLICULITIS: Status: RESOLVED | Noted: 2022-06-14 | Resolved: 2022-12-12

## 2022-12-12 PROCEDURE — 99214 OFFICE O/P EST MOD 30 MIN: CPT | Performed by: FAMILY MEDICINE

## 2022-12-12 RX ORDER — TOPIRAMATE 25 MG/1
25 TABLET, FILM COATED ORAL 2 TIMES DAILY
COMMUNITY
Start: 2022-11-04 | End: 2022-12-12

## 2022-12-12 RX ORDER — SERTRALINE HYDROCHLORIDE 100 MG/1
100 TABLET, FILM COATED ORAL DAILY
COMMUNITY
Start: 2022-11-23

## 2022-12-12 NOTE — PROGRESS NOTES
[facility]  [address]  [place] [address]  Phone: [phone]  Primary Provider: Verena Diaz  Pre-op Performing Provider: VERENA DIAZ       PREOPERATIVE EVALUATION:  Today's date: 12/12/2022    Cher Ibarra is a 22 year old female who presents for a preoperative evaluation.    Surgical Information:  Surgery/Procedure: ESOPHAGOGASTRODUODENOSCOPY  Surgery Location:  GI  Surgeon: Rafy Stafford MD  Surgery Date: 12/22/22  Time of Surgery: 9:30 am  Where patient plans to recover: At home with family  Fax number for surgical facility: Note does not need to be faxed, will be available electronically in Epic.    Type of Anesthesia Anticipated: to be determined    Assessment & Plan     The proposed surgical procedure is considered LOW risk.    Preop general physical exam  Gastroesophageal reflux disease without esophagitis  - omeprazole (PRILOSEC) 20 MG DR capsule; Take 1 capsule (20 mg) by mouth daily    Down's syndrome  CYP2D6 poor metabolizer (H)    Risks and Recommendations:  The patient has the following additional risks and recommendations for perioperative complications:   - No identified additional risk factors other than previously addressed    Medication Instructions:  Patient is to take all scheduled medications on the day of surgery EXCEPT for modifications listed below:   - naproxen (Aleve, Naprosyn): HOLD 4 days before surgery.     RECOMMENDATION:  APPROVAL GIVEN to proceed with proposed procedure, without further diagnostic evaluation.      Subjective     HPI related to upcoming procedure: Cher Ibarra is a 22 year old female who presents with GERD. History difficult to obtain due to patient's mental status.     Preop Questions 12/5/2022   1. Have you ever had a heart attack or stroke? No   2. Have you ever had surgery on your heart or blood vessels, such as a stent placement, a coronary artery bypass, or surgery on an artery in  your head, neck, heart, or legs? YES -     3. Do you have chest pain with activity? No   4. Do you have a history of  heart failure? No    5. Do you currently have a cold, bronchitis or symptoms of other infection? No   6. Do you have a cough, shortness of breath, or wheezing? No   7. Do you or anyone in your family have previous history of blood clots? No   8. Do you or does anyone in your family have a serious bleeding problem such as prolonged bleeding following surgeries or cuts? No   9. Have you ever had problems with anemia or been told to take iron pills? YES -     10. Have you had any abnormal blood loss such as black, tarry or bloody stools, or abnormal vaginal bleeding? No   11. Have you ever had a blood transfusion? YES -     11a. Have you ever had a transfusion reaction? No   12. Are you willing to have a blood transfusion if it is medically needed before, during, or after your surgery? Yes   13. Have you or any of your relatives ever had problems with anesthesia? YES -     14. Do you have sleep apnea, excessive snoring or daytime drowsiness? No   15. Do you have any artifical heart valves or other implanted medical devices like a pacemaker, defibrillator, or continuous glucose monitor? No   16. Do you have artificial joints? No   17. Are you allergic to latex? No   18. Is there any chance that you may be pregnant? No       Health Care Directive:  Patient has a Health Care Directive on file      Preoperative Review of :   reviewed - controlled substances reflected in medication list.       Status of Chronic Conditions:  See problem list for active medical problems.  Problems all longstanding and stable, except as noted/documented.  See ROS for pertinent symptoms related to these conditions.      Review of Systems  CONSTITUTIONAL: NEGATIVE for fever, chills, change in weight  INTEGUMENTARY/SKIN: trichotillomania  EYES: NEGATIVE for vision changes or irritation  ENT/MOUTH: NEGATIVE for ear, mouth and  throat problems  RESP: NEGATIVE for significant cough or SOB  CV: NEGATIVE for chest pain, palpitations or peripheral edema  GI: constipation and dyspepsia  : NEGATIVE for frequency, dysuria, or hematuria  MUSCULOSKELETAL: NEGATIVE for significant arthralgias or myalgia  NEURO: Down's syndome  ENDOCRINE: NEGATIVE for temperature intolerance, skin/hair changes  HEME: NEGATIVE for bleeding problems  PSYCHIATRIC: anxiety  Constitutional, neuro, ENT, endocrine, pulmonary, cardiac, gastrointestinal, genitourinary, musculoskeletal, integument and psychiatric systems are negative, except as otherwise noted.    Patient Active Problem List    Diagnosis Date Noted     CYP2D6 poor metabolizer (H) 02/06/2019     Priority: High     Formatting of this note is different from the original.  [facility]   codeine             Reactions:             Not likely to be effective due to CYP2D6 status             traMADol             Reactions:             Not likely to be effective due to CYP2D6 status             HYDROcodone             Reactions:             Not likely to be effective due to CYP2D6 status             oxyCODONE             Reactions:             Not likely to be effective due to CYP2D6 status  Formatting of this note is different from the original.  [facility].   codeine             Reactions:             Not likely to be effective due to CYP2D6 status             traMADol             Reactions:             Not likely to be effective due to CYP2D6 status             HYDROcodone             Reactions:             Not likely to be effective due to CYP2D6 status             oxyCODONE             Reactions:             Not likely to be effective due to CYP2D6 status       Down's syndrome 11/22/2010     Priority: High     [facility]       Atrial septal defect 12/12/2022     Priority: Medium     Conductive hearing loss 12/12/2022      Priority: Medium     GERD (gastroesophageal reflux disease)      Priority: Medium     Hidradenitis suppurativa 11/09/2018     Priority: Medium     Obesity 03/05/2018     Priority: Medium     Anxiety disorder 07/07/2015     Priority: Medium     Zoophobia 06/14/2022     Priority: Low     Social phobia 06/14/2022     Priority: Low     Persistent insomnia 06/14/2022     Priority: Low     Trichotillomania      Priority: Low     Pronation of both feet 11/09/2018     Priority: Low     Pes planus 11/09/2018     Priority: Low     Dysmenorrhea 02/24/2014     Priority: Low     [facility] 8/2013 and started on naprosyn, much help.       Chronic rhinitis 07/02/2012     Priority: Low      Past Medical History:   Diagnosis Date     Anxiety disorder 07/07/2015     ASD (atrial septal defect) 1998    Repair of ASD/VSD     Chronic rhinitis      Conductive hearing loss 12/12/2022     CYP2D6 poor metabolizer (H) 02/06/2019    Formatting of this note is different from the original. [facility]  codeine            Reactions:            Not likely to be effective due to CYP2D6 status            traMADol            Reactions:            Not likely to be effective due to CYP2D6 status            HYDROcodone            Reactions:            Not likely to be effective due to CYP2D6 status            oxyCO     Down's syndrome 11/22/2010    Followed by [facility] yearly     GERD (gastroesophageal reflux disease)      Hidradenitis suppurativa 11/09/2018     Keratosis pilaris      Obesity 03/05/2018     Social phobia 06/14/2022     Strabismus      Trichotillomania      Zoophobia 06/14/2022     Past Surgical History:   Procedure Laterality Date     AS EXCIS PRIMARY GANGLION WRIST  01/2020    wrist and ankle     ESOPHAGOSCOPY, GASTROSCOPY, DUODENOSCOPY (EGD), COMBINED N/A 12/11/2020    Procedure: ESOPHAGOGASTRODUODENOSCOPY, WITH BIOPSY;  Surgeon: Antolin Tenorio MD;   Location: [facility] OR     EXAM UNDER ANESTHESIA PELVIC N/A 10/1/2021    Procedure: EXAM UNDER ANESTHESIA, PELVIS, pap smear, breast exam, administration of flu vaccine, excision of blackheads in groin;  Surgeon: Haily Taylor MD;  Location: [facility]     MAMMOPLASTY REDUCTION       REPAIR ATRIAL SEPTAL DEFECT      age 1     REPAIR VENTRICULAR SEPTAL DEFECT      age 1     TONSILLECTOMY & ADENOIDECTOMY      AGE 3     Current Outpatient Medications   Medication Sig Dispense Refill     acetylcysteine (N-ACETYL CYSTEINE) 600 MG CAPS capsule Take 1 cap by mouth twice daily for one week, then 2 caps by mouth twice daily for one week, then 3 caps by mouth twice daily 540 capsule 3     ammonium lactate (AMLACTIN) 12 % external cream Apply topically 2 times daily as needed for dry skin       cetirizine (ZYRTEC) 10 MG tablet Take 1 tablet (10 mg) by mouth every evening 90 tablet 1     Cholecalciferol (VITAMIN D3) 50 MCG (2000 UT) TABS TAKE 1 TABLET BY MOUTH DAILY 90 tablet 1     clotrimazole (LOTRIMIN) 1 % external cream Apply topically every morning 30 g 1     docusate sodium (COLACE) 100 MG capsule TAKE ONE CAPSULE BY MOUTH EVERY DAY 90 capsule 1     fluticasone (FLONASE) 50 MCG/ACT nasal spray Spray 1-2 sprays into both nostrils daily 16 mL 3     folic acid (FOLVITE) 1 MG tablet TAKE 1 TABLET(1 MG) BY MOUTH DAILY 90 tablet 1     levonorgestrel-ethinyl estradiol (SEASONALE) 0.15-0.03 MG tablet TAKE ONE TABLET BY MOUTH EVERY DAY 91 tablet 1     lidocaine-prilocaine (LIDOPRIL) 2.5-2.5 % kit Use as directed 1 kit 0     LORazepam (ATIVAN) 0.5 MG tablet Take 1 tablet (0.5 mg) by mouth every 6 hours as needed for anxiety 20 tablet 0     melatonin 5 MG tablet None Entered       mupirocin (BACTROBAN) 2 % external ointment APPLY EXTERNALLY TO THE AFFECTED AREA THREE TIMES DAILY AS NEEDED 22 g 0     naproxen (NAPROSYN) 500 MG tablet Take 1 tablet (500 mg) by mouth 2 times daily as needed 60 tablet 1     Omega-3 Fatty Acids (OMEGA-3 FISH  "OIL PO) Take 1 g by mouth daily       omeprazole (PRILOSEC) 20 MG DR capsule Take 1 capsule (20 mg) by mouth daily 90 capsule 3     triamcinolone (ARISTOCORT HP) 0.5 % external cream APPLY EXTERNALLY TO THE AFFECTED AREA TWICE DAILY AS DIRECTED 30 g 0     vitamin D3 (CHOLECALCIFEROL) 50 mcg (2000 units) tablet Take 2 tablets (100 mcg) by mouth daily 180 tablet 3     sertraline (ZOLOFT) 100 MG tablet Take 100 mg by mouth daily       topiramate (TOPAMAX) 25 MG tablet Take 25 mg by mouth 2 times daily         Allergies   Allergen Reactions     Azithromycin Hives     Codeine      Hydrocodone      Oxycodone      Seasonal Allergies      Tramadol      Zithromax [Azithromycin Dihydrate]         Social History     Tobacco Use     Smoking status: Never     Smokeless tobacco: Never   Substance Use Topics     Alcohol use: No     Family History   Problem Relation Age of Onset     Cerebrovascular Disease Mother      Depression Mother      Anxiety Disorder Mother      Obesity Mother      Hyperlipidemia Mother      Asthma Mother      Anesthesia Reaction Maternal Grandmother      Prostate Cancer Maternal Grandfather      Other Cancer Maternal Grandfather      Diabetes Other      Substance Abuse Other      C.A.D. No family hx of      Hypertension No family hx of      Breast Cancer No family hx of      History   Drug Use No         Objective     /66 (BP Location: Left arm, Patient Position: Sitting, Cuff Size: Adult Large)   Pulse 95   Temp 98.3  F (36.8  C) (Oral)   Ht 1.475 m (4' 10.07\")   Wt 85.3 kg (188 lb)   SpO2 97%   BMI 39.20 kg/m      Physical Exam    GENERAL APPEARANCE: alert, no distress and obese     EYES: EOMI, PERRL     HENT: ear canals and TM's normal and nose and mouth without ulcers or lesions     NECK: no adenopathy, no asymmetry, masses, or scars and thyroid normal to palpation     RESP: lungs clear to auscultation - no rales, rhonchi or wheezes     CV: regular rates and rhythm, normal S1 S2, no S3 or S4 " and no murmur, click or rub     ABDOMEN:  soft, nontender, no HSM or masses and bowel sounds normal     MS: extremities normal- no gross deformities noted, no evidence of inflammation in joints, FROM in all extremities.     SKIN: few dry pink papules on right scalp      NEURO: intellectual disability; sparse speech; poor eye contact      PSYCH: anxious     LYMPHATICS: No cervical adenopathy    Recent Labs   Lab Test 10/01/21  0939   HGB 12.9         POTASSIUM 3.3*   CR 0.88   A1C 5.3        Diagnostics:  No labs were ordered during this visit.   No EKG required, no history of coronary heart disease, significant arrhythmia, peripheral arterial disease or other structural heart disease.    Revised Cardiac Risk Index (RCRI):  The patient has the following serious cardiovascular risks for perioperative complications:   - No serious cardiac risks = 0 points     RCRI Interpretation: 0 points: Class I (very low risk - 0.4% complication rate)           Signed Electronically by: Paloma Diaz MD  Copy of this evaluation report is provided to requesting physician.

## 2022-12-12 NOTE — PATIENT INSTRUCTIONS
Do not take naprosyn for 4 days prior to surgery.     Do not take cetirizine (Zyrtec) on the day of surgery.     Preparing for Your Surgery  Getting started  A nurse will call you to review your health history and instructions. They will give you an arrival time based on your scheduled surgery time. Please be ready to share:  Your doctor's clinic name and phone number  Your medical, surgical, and anesthesia history  A list of allergies and sensitivities  A list of medicines, including herbal treatments and over-the-counter drugs  Whether the patient has a legal guardian (ask how to send us the papers in advance)  Please tell us if you're pregnant--or if there's any chance you might be pregnant. Some surgeries may injure a fetus (unborn baby), so they require a pregnancy test. Surgeries that are safe for a fetus don't always need a test, and you can choose whether to have one.   If you have a child who's having surgery, please ask for a copy of Preparing for Your Child's Surgery.    Preparing for surgery  Within 10 to 30 days of surgery: Have a pre-op exam (sometimes called an H&P, or History and Physical). This can be done at a clinic or pre-operative center.  If you're having a , you may not need this exam. Talk to your care team.  At your pre-op exam, talk to your care team about all medicines you take. If you need to stop any medicines before surgery, ask when to start taking them again.  We do this for your safety. Many medicines can make you bleed too much during surgery. Some change how well surgery (anesthesia) drugs work.  Call your insurance company to let them know you're having surgery. (If you don't have insurance, call [phone].)  Call your clinic if there's any change in your health. This includes signs of a cold or flu (sore throat, runny nose, cough, rash, fever). It also includes a scrape or scratch near the surgery site.  If you have questions on the day of surgery, call your hospital  or surgery center.  Eating and drinking guidelines  For your safety: Unless your surgeon tells you otherwise, follow the guidelines below.  Eat and drink as usual until 8 hours before you arrive for surgery. After that, no food or milk.  Drink clear liquids until 2 hours before you arrive. These are liquids you can see through, like water, Gatorade, and Propel Water. They also include plain black coffee and tea (no cream or milk), candy, and breath mints. You can spit out gum when you arrive.  If you drink alcohol: Stop drinking it the night before surgery.  If your care team tells you to take medicine on the morning of surgery, it's okay to take it with a sip of water.  Preventing infection  Shower or bathe the night before and morning of your surgery. Follow the instructions your clinic gave you. (If no instructions, use regular soap.)  Don't shave or clip hair near your surgery site. We'll remove the hair if needed.  Don't smoke or vape the morning of surgery. You may chew nicotine gum up to 2 hours before surgery. A nicotine patch is okay.  Note: Some surgeries require you to completely quit smoking and nicotine. Check with your surgeon.  Your care team will make every effort to keep you safe from infection. We will:  Clean our hands often with soap and water (or an alcohol-based hand rub).  Clean the skin at your surgery site with a special soap that kills germs.  Give you a special gown to keep you warm. (Cold raises the risk of infection.)  Wear special hair covers, masks, gowns and gloves during surgery.  Give antibiotic medicine, if prescribed. Not all surgeries need antibiotics.  What to bring on the day of surgery  Photo ID and insurance card  Copy of your health care directive, if you have one  Glasses and hearing aids (bring cases)  You can't wear contacts during surgery  Inhaler and eye drops, if you use them (tell us about these when you arrive)  CPAP machine or breathing device, if you use them  A  few personal items, if spending the night  If you have . . .  A pacemaker, ICD (cardiac defibrillator) or other implant: Bring the ID card.  An implanted stimulator: Bring the remote control.  A legal guardian: Bring a copy of the certified (court-stamped) guardianship papers.  Please remove any jewelry, including body piercings. Leave jewelry and other valuables at home.  If you're going home the day of surgery  You must have a responsible adult drive you home. They should stay with you overnight as well.  If you don't have someone to stay with you, and you aren't safe to go home alone, we may keep you overnight. Insurance often won't pay for this.  After surgery  If it's hard to control your pain or you need more pain medicine, please call your surgeon's office.  Questions?   If you have any questions for your care team, list them here: _________________________________________________________________________________________________________________________________________________________________________ ____________________________________ ____________________________________ ____________________________________

## 2022-12-14 ENCOUNTER — TELEPHONE (OUTPATIENT)
Dept: GASTROENTEROLOGY | Facility: CLINIC | Age: 22
End: 2022-12-14

## 2022-12-14 NOTE — TELEPHONE ENCOUNTER
Attempted to contact patient Legal guardian - Mother Safia regarding upcoming EGD procedure on 12/22/22 for pre assessment questions. No answer.     Left message to return call to [phone] #[phone]    Discuss Covid policy.     Pre op exam scheduled: N/A    Arrival time: 0800    Facility location: [facility]; [address] [place], [place] [id number]    Sedation type: MAC    Anticoagulants: No    Electronic implanted devices? No    Diabetic? No    Indication for procedure: Post tussive vomiting, reflux    Prep instructions sent via [employer].     HUYEN STEEL RN

## 2022-12-19 NOTE — TELEPHONE ENCOUNTER
Pre assessment questions completed for upcoming upper endoscopy (EGD) procedure scheduled on 12.22.2022 with mother (legal guardian) Safia LEVY policy reviewed.     Pre-op scheduled  N/A    Reviewed procedural arrival time 0800 and facility location [facility]; [address], [place] [address]    Designated  policy reviewed. Instructed to have someone stay 24 hours post procedure.     Anticoagulation/blood thinners? No    Electronic implanted devices? No    Diabetic? No    Procedure indication: Post tussive vomiting, reflux    Reviewed procedure prep instructions.     Prep instructions sent via [employer].      Patient's mother verbalized understanding and had no questions or concerns at this time.    Carmen Ramos RN

## 2022-12-21 ENCOUNTER — ANESTHESIA EVENT (OUTPATIENT)
Dept: GASTROENTEROLOGY | Facility: CLINIC | Age: 22
End: 2022-12-21
Payer: COMMERCIAL

## 2022-12-21 RX ORDER — HYDRALAZINE HYDROCHLORIDE 20 MG/ML
2.5-5 INJECTION INTRAMUSCULAR; INTRAVENOUS EVERY 10 MIN PRN
Status: CANCELLED | OUTPATIENT
Start: 2022-12-21

## 2022-12-21 RX ORDER — FENTANYL CITRATE 50 UG/ML
25 INJECTION, SOLUTION INTRAMUSCULAR; INTRAVENOUS EVERY 5 MIN PRN
Status: CANCELLED | OUTPATIENT
Start: 2022-12-21

## 2022-12-21 RX ORDER — DIMENHYDRINATE 50 MG/ML
25 INJECTION, SOLUTION INTRAMUSCULAR; INTRAVENOUS
Status: CANCELLED | OUTPATIENT
Start: 2022-12-21

## 2022-12-21 RX ORDER — ACETAMINOPHEN 500 MG
1000 TABLET ORAL ONCE
Status: CANCELLED | OUTPATIENT
Start: 2022-12-21 | End: 2022-12-21

## 2022-12-21 RX ORDER — HYDROMORPHONE HYDROCHLORIDE 1 MG/ML
0.4 INJECTION, SOLUTION INTRAMUSCULAR; INTRAVENOUS; SUBCUTANEOUS EVERY 5 MIN PRN
Status: CANCELLED | OUTPATIENT
Start: 2022-12-21

## 2022-12-21 RX ORDER — LABETALOL HYDROCHLORIDE 5 MG/ML
10 INJECTION, SOLUTION INTRAVENOUS
Status: CANCELLED | OUTPATIENT
Start: 2022-12-21

## 2022-12-21 RX ORDER — SODIUM CHLORIDE, SODIUM LACTATE, POTASSIUM CHLORIDE, CALCIUM CHLORIDE 600; 310; 30; 20 MG/100ML; MG/100ML; MG/100ML; MG/100ML
INJECTION, SOLUTION INTRAVENOUS CONTINUOUS
Status: CANCELLED | OUTPATIENT
Start: 2022-12-21

## 2022-12-21 RX ORDER — FENTANYL CITRATE 50 UG/ML
50 INJECTION, SOLUTION INTRAMUSCULAR; INTRAVENOUS EVERY 5 MIN PRN
Status: CANCELLED | OUTPATIENT
Start: 2022-12-21

## 2022-12-21 RX ORDER — HYDROMORPHONE HYDROCHLORIDE 1 MG/ML
0.2 INJECTION, SOLUTION INTRAMUSCULAR; INTRAVENOUS; SUBCUTANEOUS EVERY 5 MIN PRN
Status: CANCELLED | OUTPATIENT
Start: 2022-12-21

## 2022-12-21 NOTE — ANESTHESIA PREPROCEDURE EVALUATION
Anesthesia Pre-Procedure Evaluation    Patient: Cher Ibarra   MRN: [id number] : 2000        Procedure : Procedure(s):  ESOPHAGOGASTRODUODENOSCOPY (EGD)          Past Medical History:   Diagnosis Date     Anxiety disorder 2015     ASD (atrial septal defect) 1998    Repair of ASD/VSD     Chronic rhinitis      Conductive hearing loss 2022     CYP2D6 poor metabolizer (H) 2019    Formatting of this note is different from the original. [facility] through [facility]  codeine            Reactions:            Not likely to be effective due to CYP2D6 status            traMADol            Reactions:            Not likely to be effective due to CYP2D6 status            HYDROcodone            Reactions:            Not likely to be effective due to CYP2D6 status            oxyCO     Down's syndrome 2010    Followed by [facility]'s clinic yearly     GERD (gastroesophageal reflux disease)      Hidradenitis suppurativa 2018     Keratosis pilaris      Obesity 2018     Social phobia 2022     Strabismus      Trichotillomania      Zoophobia 2022      Past Surgical History:   Procedure Laterality Date     AS EXCIS PRIMARY GANGLION WRIST  2020    wrist and ankle     ESOPHAGOSCOPY, GASTROSCOPY, DUODENOSCOPY (EGD), COMBINED N/A 2020    Procedure: ESOPHAGOGASTRODUODENOSCOPY, WITH BIOPSY;  Surgeon: Antolin Tenorio MD;  Location: [facility] OR     EXAM UNDER ANESTHESIA PELVIC N/A 10/1/2021    Procedure: EXAM UNDER ANESTHESIA, PELVIS, pap smear, breast exam, administration of flu vaccine, excision of blackheads in groin;  Surgeon: Haily Taylor MD;  Location: [facility] OR     MAMMOPLASTY REDUCTION       REPAIR ATRIAL SEPTAL DEFECT      age 1     REPAIR VENTRICULAR SEPTAL DEFECT      age 1     TONSILLECTOMY & ADENOIDECTOMY      AGE 3      Allergies   Allergen Reactions     Azithromycin Hives     Codeine      Hydrocodone      Oxycodone      Seasonal Allergies       Tramadol      Zithromax [Azithromycin Dihydrate]       Social History     Tobacco Use     Smoking status: Never     Smokeless tobacco: Never   Substance Use Topics     Alcohol use: No      Wt Readings from Last 1 Encounters:   12/12/22 85.3 kg (188 lb)        Anesthesia Evaluation   Pt has had prior anesthetic. Type: General.    No history of anesthetic complications       ROS/MED HX  ENT/Pulmonary:       Neurologic: Comment: Down's syndrome    (+) Developmental delay,     Cardiovascular: Comment: Hx of ASD and VSD, s/p repair      METS/Exercise Tolerance:     Hematologic:       Musculoskeletal:       GI/Hepatic: Comment: GERD has been well controlled recently    (+) GERD, Asymptomatic on medication,     Renal/Genitourinary:       Endo:     (+) Obesity,     Psychiatric/Substance Use:       Infectious Disease:       Malignancy:       Other:            Physical Exam    Airway      Comment: Large tongue    Mallampati: II   TM distance: > 3 FB   Neck ROM: full   Mouth opening: > 3 cm    Respiratory Devices and Support         Dental  no notable dental history         Cardiovascular          Rhythm and rate: regular and normal     Pulmonary           breath sounds clear to auscultation           OUTSIDE LABS:  CBC:   Lab Results   Component Value Date    WBC 5.0 10/01/2021    WBC 4.1 12/11/2020    HGB 12.9 10/01/2021    HGB 12.4 12/11/2020    HCT 38.0 10/01/2021    HCT 36.7 12/11/2020     10/01/2021     12/11/2020     BMP:   Lab Results   Component Value Date     10/01/2021     12/11/2020    POTASSIUM 3.3 (L) 10/01/2021    POTASSIUM 3.5 12/11/2020    CHLORIDE 110 (H) 10/01/2021    CHLORIDE 108 12/11/2020    CO2 20 10/01/2021    CO2 23 12/11/2020    BUN 8 10/01/2021    BUN 10 12/11/2020    CR 0.88 10/01/2021    CR 0.85 12/11/2020    GLC 95 10/01/2021    GLC 87 12/11/2020     COAGS:   Lab Results   Component Value Date    INR 1.01 12/11/2020     POC:   Lab Results   Component Value Date    HCG  Negative 10/01/2021     HEPATIC:   Lab Results   Component Value Date    ALBUMIN 3.2 (L) 10/01/2021    PROTTOTAL 7.3 10/01/2021    ALT 24 10/01/2021    AST 16 10/01/2021    ALKPHOS 74 10/01/2021    BILITOTAL 0.3 10/01/2021     OTHER:   Lab Results   Component Value Date    A1C 5.3 10/01/2021    FRANKI 8.3 (L) 10/01/2021    PHOS 2.6 10/01/2021    MAG 2.2 10/01/2021    LIPASE 107 12/11/2020    TSH 2.35 10/01/2021       Anesthesia Plan    ASA Status:  3   NPO Status:  NPO Appropriate    Anesthesia Type: MAC.   Induction: Inhalation (PO midazolam with nitrous for IV placement).   Maintenance: TIVA.        Consents    Anesthesia Plan(s) and associated risks, benefits, and realistic alternatives discussed. Questions answered and patient/representative(s) expressed understanding.    - Discussed:     - Discussed with:  Patient, Parent (Mother and/or Father)      - Specific Concerns: aspiration.     - Extended Intubation/Ventilatory Support Discussed: No.      - Patient is DNR/DNI Status: No    Use of blood products discussed: No .     Postoperative Care    Pain management: IV analgesics.   PONV prophylaxis: Ondansetron (or other 5HT-3), Background Propofol Infusion     Comments:                Tashi Preciado MD

## 2022-12-22 ENCOUNTER — HOSPITAL ENCOUNTER (OUTPATIENT)
Facility: CLINIC | Age: 22
Discharge: HOME | End: 2022-12-22
Attending: INTERNAL MEDICINE | Admitting: INTERNAL MEDICINE
Payer: COMMERCIAL

## 2022-12-22 ENCOUNTER — ANESTHESIA (OUTPATIENT)
Dept: GASTROENTEROLOGY | Facility: CLINIC | Age: 22
End: 2022-12-22
Payer: COMMERCIAL

## 2022-12-22 VITALS
HEIGHT: 59 IN | TEMPERATURE: 97.6 F | HEART RATE: 72 BPM | BODY MASS INDEX: 36.89 KG/M2 | WEIGHT: 182.98 LBS | RESPIRATION RATE: 16 BRPM | SYSTOLIC BLOOD PRESSURE: 93 MMHG | OXYGEN SATURATION: 100 % | DIASTOLIC BLOOD PRESSURE: 56 MMHG

## 2022-12-22 DIAGNOSIS — E66.9 OBESITY WITHOUT SERIOUS COMORBIDITY, UNSPECIFIED CLASSIFICATION, UNSPECIFIED OBESITY TYPE: ICD-10-CM

## 2022-12-22 DIAGNOSIS — Z11.4 SCREENING FOR HIV (HUMAN IMMUNODEFICIENCY VIRUS): ICD-10-CM

## 2022-12-22 DIAGNOSIS — Z11.59 NEED FOR HEPATITIS C SCREENING TEST: ICD-10-CM

## 2022-12-22 DIAGNOSIS — Q90.9 DOWN'S SYNDROME: ICD-10-CM

## 2022-12-22 LAB
ALBUMIN SERPL BCG-MCNC: 3.7 G/DL (ref 3.5–5.2)
ALP SERPL-CCNC: 66 U/L (ref 35–104)
ALT SERPL W P-5'-P-CCNC: 14 U/L (ref 10–35)
ANION GAP SERPL CALCULATED.3IONS-SCNC: 13 MMOL/L (ref 7–15)
AST SERPL W P-5'-P-CCNC: 22 U/L (ref 10–35)
BASOPHILS # BLD AUTO: 0 10E3/UL (ref 0–0.2)
BASOPHILS NFR BLD AUTO: 1 %
BILIRUB SERPL-MCNC: 0.3 MG/DL
BUN SERPL-MCNC: 10.5 MG/DL (ref 6–20)
CALCIUM SERPL-MCNC: 8.8 MG/DL (ref 8.6–10)
CHLORIDE SERPL-SCNC: 105 MMOL/L (ref 98–107)
CHOLEST SERPL-MCNC: 193 MG/DL
CREAT SERPL-MCNC: 0.77 MG/DL (ref 0.51–0.95)
DEPRECATED CALCIDIOL+CALCIFEROL SERPL-MC: 37 UG/L (ref 20–75)
DEPRECATED HCO3 PLAS-SCNC: 21 MMOL/L (ref 22–29)
EOSINOPHIL # BLD AUTO: 0 10E3/UL (ref 0–0.7)
EOSINOPHIL NFR BLD AUTO: 0 %
ERYTHROCYTE [DISTWIDTH] IN BLOOD BY AUTOMATED COUNT: 12.7 % (ref 10–15)
FERRITIN SERPL-MCNC: 135 NG/ML (ref 6–175)
GFR SERPL CREATININE-BSD FRML MDRD: >90 ML/MIN/1.73M2
GLUCOSE SERPL-MCNC: 98 MG/DL (ref 70–99)
HCT VFR BLD AUTO: 40.5 % (ref 35–47)
HCV AB SERPL QL IA: NONREACTIVE
HDLC SERPL-MCNC: 59 MG/DL
HGB BLD-MCNC: 13.7 G/DL (ref 11.7–15.7)
HIV 1+2 AB+HIV1 P24 AG SERPL QL IA: NONREACTIVE
HOLD SPECIMEN: NORMAL
HOLD SPECIMEN: NORMAL
IMM GRANULOCYTES # BLD: 0 10E3/UL
IMM GRANULOCYTES NFR BLD: 1 %
LDLC SERPL CALC-MCNC: 125 MG/DL
LYMPHOCYTES # BLD AUTO: 1.9 10E3/UL (ref 0.8–5.3)
LYMPHOCYTES NFR BLD AUTO: 36 %
MAGNESIUM SERPL-MCNC: 2 MG/DL (ref 1.7–2.3)
MCH RBC QN AUTO: 32.5 PG (ref 26.5–33)
MCHC RBC AUTO-ENTMCNC: 33.8 G/DL (ref 31.5–36.5)
MCV RBC AUTO: 96 FL (ref 78–100)
MONOCYTES # BLD AUTO: 0.4 10E3/UL (ref 0–1.3)
MONOCYTES NFR BLD AUTO: 8 %
NEUTROPHILS # BLD AUTO: 2.9 10E3/UL (ref 1.6–8.3)
NEUTROPHILS NFR BLD AUTO: 54 %
NONHDLC SERPL-MCNC: 134 MG/DL
NRBC # BLD AUTO: 0 10E3/UL
NRBC BLD AUTO-RTO: 0 /100
PHOSPHATE SERPL-MCNC: 2.5 MG/DL (ref 2.5–4.5)
PLATELET # BLD AUTO: 293 10E3/UL (ref 150–450)
POTASSIUM SERPL-SCNC: 3.7 MMOL/L (ref 3.4–5.3)
PROT SERPL-MCNC: 6.8 G/DL (ref 6.4–8.3)
RBC # BLD AUTO: 4.22 10E6/UL (ref 3.8–5.2)
SODIUM SERPL-SCNC: 139 MMOL/L (ref 136–145)
TRIGL SERPL-MCNC: 47 MG/DL
TSH SERPL DL<=0.005 MIU/L-ACNC: 2.93 UIU/ML (ref 0.3–4.2)
UPPER GI ENDOSCOPY: NORMAL
WBC # BLD AUTO: 5.3 10E3/UL (ref 4–11)

## 2022-12-22 PROCEDURE — 250N000011 HC RX IP 250 OP 636: Performed by: FAMILY MEDICINE

## 2022-12-22 PROCEDURE — 250N000011 HC RX IP 250 OP 636: Performed by: ANESTHESIOLOGY

## 2022-12-22 PROCEDURE — 80053 COMPREHEN METABOLIC PANEL: CPT | Performed by: FAMILY MEDICINE

## 2022-12-22 PROCEDURE — 83735 ASSAY OF MAGNESIUM: CPT | Performed by: FAMILY MEDICINE

## 2022-12-22 PROCEDURE — 85025 COMPLETE CBC W/AUTO DIFF WBC: CPT | Performed by: FAMILY MEDICINE

## 2022-12-22 PROCEDURE — 84443 ASSAY THYROID STIM HORMONE: CPT | Performed by: FAMILY MEDICINE

## 2022-12-22 PROCEDURE — 250N000013 HC RX MED GY IP 250 OP 250 PS 637

## 2022-12-22 PROCEDURE — 91312 HC RX IP 250 OP 636: CPT | Performed by: FAMILY MEDICINE

## 2022-12-22 PROCEDURE — 90686 IIV4 VACC NO PRSV 0.5 ML IM: CPT | Performed by: FAMILY MEDICINE

## 2022-12-22 PROCEDURE — 370N000017 HC ANESTHESIA TECHNICAL FEE, PER MIN: Performed by: INTERNAL MEDICINE

## 2022-12-22 PROCEDURE — 88305 TISSUE EXAM BY PATHOLOGIST: CPT | Mod: TC | Performed by: INTERNAL MEDICINE

## 2022-12-22 PROCEDURE — 250N000009 HC RX 250: Performed by: ANESTHESIOLOGY

## 2022-12-22 PROCEDURE — 36415 COLL VENOUS BLD VENIPUNCTURE: CPT

## 2022-12-22 PROCEDURE — 82306 VITAMIN D 25 HYDROXY: CPT | Performed by: FAMILY MEDICINE

## 2022-12-22 PROCEDURE — 86803 HEPATITIS C AB TEST: CPT

## 2022-12-22 PROCEDURE — 90715 TDAP VACCINE 7 YRS/> IM: CPT | Performed by: FAMILY MEDICINE

## 2022-12-22 PROCEDURE — 43239 EGD BIOPSY SINGLE/MULTIPLE: CPT | Performed by: INTERNAL MEDICINE

## 2022-12-22 PROCEDURE — 87389 HIV-1 AG W/HIV-1&-2 AB AG IA: CPT

## 2022-12-22 PROCEDURE — 258N000003 HC RX IP 258 OP 636: Performed by: ANESTHESIOLOGY

## 2022-12-22 PROCEDURE — 90472 IMMUNIZATION ADMIN EACH ADD: CPT | Performed by: FAMILY MEDICINE

## 2022-12-22 PROCEDURE — G0008 ADMIN INFLUENZA VIRUS VAC: HCPCS | Performed by: FAMILY MEDICINE

## 2022-12-22 PROCEDURE — 80061 LIPID PANEL: CPT | Performed by: FAMILY MEDICINE

## 2022-12-22 PROCEDURE — 0124A HC ADMIN COVID VAC PFIZER 12+ BIVAL ADDITIONAL: CPT | Performed by: FAMILY MEDICINE

## 2022-12-22 PROCEDURE — 82728 ASSAY OF FERRITIN: CPT | Performed by: FAMILY MEDICINE

## 2022-12-22 PROCEDURE — 84100 ASSAY OF PHOSPHORUS: CPT | Performed by: FAMILY MEDICINE

## 2022-12-22 RX ORDER — ONDANSETRON 2 MG/ML
INJECTION INTRAMUSCULAR; INTRAVENOUS PRN
Status: DISCONTINUED | OUTPATIENT
Start: 2022-12-22 | End: 2022-12-22

## 2022-12-22 RX ORDER — NALOXONE HYDROCHLORIDE 0.4 MG/ML
0.2 INJECTION, SOLUTION INTRAMUSCULAR; INTRAVENOUS; SUBCUTANEOUS
Status: CANCELLED | OUTPATIENT
Start: 2022-12-22

## 2022-12-22 RX ORDER — LIDOCAINE 40 MG/G
CREAM TOPICAL
Status: DISCONTINUED | OUTPATIENT
Start: 2022-12-22 | End: 2022-12-22 | Stop reason: HOSPADM

## 2022-12-22 RX ORDER — ONDANSETRON 4 MG/1
4 TABLET, ORALLY DISINTEGRATING ORAL EVERY 6 HOURS PRN
Status: CANCELLED | OUTPATIENT
Start: 2022-12-22

## 2022-12-22 RX ORDER — PROPOFOL 10 MG/ML
INJECTION, EMULSION INTRAVENOUS CONTINUOUS PRN
Status: DISCONTINUED | OUTPATIENT
Start: 2022-12-22 | End: 2022-12-22

## 2022-12-22 RX ORDER — NALOXONE HYDROCHLORIDE 0.4 MG/ML
0.4 INJECTION, SOLUTION INTRAMUSCULAR; INTRAVENOUS; SUBCUTANEOUS
Status: CANCELLED | OUTPATIENT
Start: 2022-12-22

## 2022-12-22 RX ORDER — MIDAZOLAM HYDROCHLORIDE 2 MG/ML
20 SYRUP ORAL ONCE
Status: COMPLETED | OUTPATIENT
Start: 2022-12-22 | End: 2022-12-22

## 2022-12-22 RX ORDER — LIDOCAINE HYDROCHLORIDE 20 MG/ML
INJECTION, SOLUTION INFILTRATION; PERINEURAL PRN
Status: DISCONTINUED | OUTPATIENT
Start: 2022-12-22 | End: 2022-12-22

## 2022-12-22 RX ORDER — SODIUM CHLORIDE, SODIUM LACTATE, POTASSIUM CHLORIDE, CALCIUM CHLORIDE 600; 310; 30; 20 MG/100ML; MG/100ML; MG/100ML; MG/100ML
INJECTION, SOLUTION INTRAVENOUS CONTINUOUS PRN
Status: DISCONTINUED | OUTPATIENT
Start: 2022-12-22 | End: 2022-12-22

## 2022-12-22 RX ORDER — PROCHLORPERAZINE MALEATE 10 MG
10 TABLET ORAL EVERY 6 HOURS PRN
Status: CANCELLED | OUTPATIENT
Start: 2022-12-22

## 2022-12-22 RX ORDER — PROPOFOL 10 MG/ML
INJECTION, EMULSION INTRAVENOUS PRN
Status: DISCONTINUED | OUTPATIENT
Start: 2022-12-22 | End: 2022-12-22

## 2022-12-22 RX ORDER — FLUMAZENIL 0.1 MG/ML
0.2 INJECTION, SOLUTION INTRAVENOUS
Status: CANCELLED | OUTPATIENT
Start: 2022-12-22 | End: 2022-12-22

## 2022-12-22 RX ORDER — MIDAZOLAM HYDROCHLORIDE 2 MG/ML
2 SYRUP ORAL ONCE
Status: DISCONTINUED | OUTPATIENT
Start: 2022-12-22 | End: 2022-12-22

## 2022-12-22 RX ORDER — ONDANSETRON 2 MG/ML
4 INJECTION INTRAMUSCULAR; INTRAVENOUS EVERY 6 HOURS PRN
Status: CANCELLED | OUTPATIENT
Start: 2022-12-22

## 2022-12-22 RX ORDER — ACETAMINOPHEN 500 MG
1000 TABLET ORAL ONCE
Status: DISCONTINUED | OUTPATIENT
Start: 2022-12-22 | End: 2022-12-22 | Stop reason: HOSPADM

## 2022-12-22 RX ORDER — ONDANSETRON 2 MG/ML
4 INJECTION INTRAMUSCULAR; INTRAVENOUS
Status: DISCONTINUED | OUTPATIENT
Start: 2022-12-22 | End: 2022-12-22 | Stop reason: HOSPADM

## 2022-12-22 RX ORDER — EPHEDRINE SULFATE 50 MG/ML
INJECTION, SOLUTION INTRAMUSCULAR; INTRAVENOUS; SUBCUTANEOUS PRN
Status: DISCONTINUED | OUTPATIENT
Start: 2022-12-22 | End: 2022-12-22

## 2022-12-22 RX ORDER — KETAMINE HYDROCHLORIDE 10 MG/ML
INJECTION INTRAMUSCULAR; INTRAVENOUS PRN
Status: DISCONTINUED | OUTPATIENT
Start: 2022-12-22 | End: 2022-12-22

## 2022-12-22 RX ADMIN — LIDOCAINE HYDROCHLORIDE 100 MG: 20 INJECTION, SOLUTION INFILTRATION; PERINEURAL at 09:41

## 2022-12-22 RX ADMIN — MIDAZOLAM HYDROCHLORIDE 20 MG: 2 SYRUP ORAL at 08:57

## 2022-12-22 RX ADMIN — PHENYLEPHRINE HYDROCHLORIDE 100 MCG: 10 INJECTION INTRAVENOUS at 10:09

## 2022-12-22 RX ADMIN — PHENYLEPHRINE HYDROCHLORIDE 200 MCG: 10 INJECTION INTRAVENOUS at 10:12

## 2022-12-22 RX ADMIN — Medication 5 MG: at 10:13

## 2022-12-22 RX ADMIN — BNT162B2 ORIGINAL AND OMICRON BA.4/BA.5 30 MCG: .1125; .1125 INJECTION, SUSPENSION INTRAMUSCULAR at 10:04

## 2022-12-22 RX ADMIN — CLOSTRIDIUM TETANI TOXOID ANTIGEN (FORMALDEHYDE INACTIVATED), CORYNEBACTERIUM DIPHTHERIAE TOXOID ANTIGEN (FORMALDEHYDE INACTIVATED), BORDETELLA PERTUSSIS TOXOID ANTIGEN (GLUTARALDEHYDE INACTIVATED), BORDETELLA PERTUSSIS FILAMENTOUS HEMAGGLUTININ ANTIGEN (FORMALDEHYDE INACTIVATED), BORDETELLA PERTUSSIS PERTACTIN ANTIGEN, AND BORDETELLA PERTUSSIS FIMBRIAE 2/3 ANTIGEN 0.5 ML: 5; 2; 2.5; 5; 3; 5 INJECTION, SUSPENSION INTRAMUSCULAR at 10:00

## 2022-12-22 RX ADMIN — INFLUENZA A VIRUS A/VICTORIA/2570/2019 IVR-215 (H1N1) ANTIGEN (FORMALDEHYDE INACTIVATED), INFLUENZA A VIRUS A/DARWIN/9/2021 SAN-010 (H3N2) ANTIGEN (FORMALDEHYDE INACTIVATED), INFLUENZA B VIRUS B/PHUKET/3073/2013 ANTIGEN (FORMALDEHYDE INACTIVATED), AND INFLUENZA B VIRUS B/MICHIGAN/01/2021 ANTIGEN (FORMALDEHYDE INACTIVATED) 0.5 ML: 15; 15; 15; 15 INJECTION, SUSPENSION INTRAMUSCULAR at 10:00

## 2022-12-22 RX ADMIN — PHENYLEPHRINE HYDROCHLORIDE 100 MCG: 10 INJECTION INTRAVENOUS at 10:01

## 2022-12-22 RX ADMIN — Medication 10 MG: at 10:10

## 2022-12-22 RX ADMIN — PROPOFOL 100 MCG/KG/MIN: 10 INJECTION, EMULSION INTRAVENOUS at 09:53

## 2022-12-22 RX ADMIN — PHENYLEPHRINE HYDROCHLORIDE 100 MCG: 10 INJECTION INTRAVENOUS at 10:04

## 2022-12-22 RX ADMIN — Medication 10 MG: at 10:13

## 2022-12-22 RX ADMIN — SODIUM CHLORIDE, POTASSIUM CHLORIDE, SODIUM LACTATE AND CALCIUM CHLORIDE: 600; 310; 30; 20 INJECTION, SOLUTION INTRAVENOUS at 09:41

## 2022-12-22 RX ADMIN — PROPOFOL 30 MG: 10 INJECTION, EMULSION INTRAVENOUS at 09:56

## 2022-12-22 RX ADMIN — ONDANSETRON 4 MG: 2 INJECTION INTRAMUSCULAR; INTRAVENOUS at 10:08

## 2022-12-22 ASSESSMENT — ACTIVITIES OF DAILY LIVING (ADL)
ADLS_ACUITY_SCORE: 35
ADLS_ACUITY_SCORE: 35

## 2022-12-22 NOTE — ANESTHESIA CARE TRANSFER NOTE
Patient: Cher Ibarra    Procedure: Procedure(s):  ESOPHAGOGASTRODUODENOSCOPY, WITH BIOPSY       Diagnosis: Gastroesophageal reflux disease without esophagitis [K21.9]  Diagnosis Additional Information: No value filed.    Anesthesia Type:   MAC     Note:    Oropharynx: oropharynx clear of all foreign objects and spontaneously breathing  Level of Consciousness: awake  Oxygen Supplementation: nasal cannula  Level of Supplemental Oxygen (L/min / FiO2): 4  Independent Airway: airway patency satisfactory and stable  Dentition: dentition unchanged  Vital Signs Stable: post-procedure vital signs reviewed and stable  Report to RN Given: handoff report given  Patient transferred to: PACU    Handoff Report: Identifed the Patient, Identified the Reponsible Provider, Reviewed the pertinent medical history, Discussed the surgical course, Reviewed Intra-OP anesthesia mangement and issues during anesthesia, Set expectations for post-procedure period and Allowed opportunity for questions and acknowledgement of understanding      Vitals:  Vitals Value Taken Time   /64 12/22/22 1025   Temp 36    Pulse 70 12/22/22 1025   Resp 14    SpO2 100 % 12/22/22 1026   Vitals shown include unvalidated device data.    Electronically Signed By: ABUREY BOB CRNA  December 22, 2022  10:28 AM

## 2022-12-22 NOTE — OR NURSING
EGD under MAC.  Biopsies obtained.  Pt tolerated procedure.  Writer gave flu vax, covid booster and TDAP vax while pt was under anesthesia.

## 2022-12-23 LAB
PATH REPORT.COMMENTS IMP SPEC: NORMAL
PATH REPORT.COMMENTS IMP SPEC: NORMAL
PATH REPORT.FINAL DX SPEC: NORMAL
PATH REPORT.GROSS SPEC: NORMAL
PATH REPORT.MICROSCOPIC SPEC OTHER STN: NORMAL
PATH REPORT.RELEVANT HX SPEC: NORMAL
PHOTO IMAGE: NORMAL

## 2022-12-23 PROCEDURE — 88305 TISSUE EXAM BY PATHOLOGIST: CPT | Mod: 26 | Performed by: PATHOLOGY

## 2022-12-25 NOTE — ANESTHESIA POSTPROCEDURE EVALUATION
Patient: Cher Ibarra    Procedure: Procedure(s):  ESOPHAGOGASTRODUODENOSCOPY, WITH BIOPSY       Anesthesia Type:  MAC    Note:  Disposition: Outpatient   Postop Pain Control: Uneventful            Sign Out: Well controlled pain   PONV: No   Neuro/Psych: Uneventful            Sign Out: Acceptable/Baseline neuro status   Airway/Respiratory: Uneventful            Sign Out: Acceptable/Baseline resp. status   CV/Hemodynamics: Uneventful            Sign Out: Acceptable CV status; No obvious hypovolemia; No obvious fluid overload   Other NRE: NONE   DID A NON-ROUTINE EVENT OCCUR? No           Last vitals:  Vitals Value Taken Time   BP 93/56 12/22/22 1100   Temp     Pulse 72 12/22/22 1100   Resp     SpO2 100 % 12/22/22 1100       Electronically Signed By: GLYNN SALES MD  December 25, 2022  4:12 PM

## 2023-02-01 ENCOUNTER — TRANSFERRED RECORDS (OUTPATIENT)
Dept: HEALTH INFORMATION MANAGEMENT | Facility: CLINIC | Age: 23
End: 2023-02-01

## 2023-02-11 ENCOUNTER — NURSE TRIAGE (OUTPATIENT)
Dept: NURSING | Facility: CLINIC | Age: 23
End: 2023-02-11
Payer: COMMERCIAL

## 2023-02-11 DIAGNOSIS — Z30.9 ENCOUNTER FOR CONTRACEPTIVE MANAGEMENT, UNSPECIFIED TYPE: ICD-10-CM

## 2023-02-11 RX ORDER — LEVONORGESTREL AND ETHINYL ESTRADIOL 0.15-0.03
1 KIT ORAL DAILY
Qty: 91 TABLET | Refills: 0 | Status: SHIPPED | OUTPATIENT
Start: 2023-02-11 | End: 2023-05-02

## 2023-02-11 NOTE — TELEPHONE ENCOUNTER
Pharmacy calling that pt is out of refills and requesting refill.  RN refilled faxed to pharmacy per RN refill protocol.    Last Written Prescription Date:  8/3/22  Last Fill Quantity: 91,  # refills: 1   Last office visit provider:  12/12/22     Requested Prescriptions   Pending Prescriptions Disp Refills     levonorgestrel-ethinyl estradiol (SEASONALE) 0.15-0.03 MG tablet 91 tablet 1     Sig: Take 1 tablet by mouth daily       There is no refill protocol information for this order          Kamila Sung 02/11/23 11:43 AM    Reason for Disposition    [1] Caller requesting a prescription renewal (no refills left), no triage required, AND [2] triager able to renew prescription per department policy    Additional Information    Negative: New-onset or worsening symptoms, see that guideline (e.g., diarrhea, runny nose, sore throat)    Negative: Medicine question not related to refill or renewal    Negative: Caller (e.g., patient or pharmacist) requesting information about a new medicine    Negative: Caller requesting information unrelated to medicine    Negative: [1] Prescription refill request for ESSENTIAL medicine (i.e., likelihood of harm to patient if not taken) AND [2] triager unable to refill per department policy    Negative: [1] Prescription not at pharmacy AND [2] was prescribed by PCP recently  (Exception: triager has access to EMR and prescription is recorded there. Go to Home Care and confirm for pharmacy.)    Negative: [1] Pharmacy calling with prescription questions AND [2] triager unable to answer question    Negative: Prescription request for new medicine (not a refill)    Negative: Caller requesting a CONTROLLED substance prescription refill (e.g., narcotics, ADHD medicines)    Negative: [1] Prescription refill request for NON-ESSENTIAL medicine (i.e., no harm to patient if med not taken) AND [2] triager unable to refill per department policy    Negative: [1] Caller has NON-URGENT medicine question  about med that PCP prescribed AND [2] triager unable to answer question    Negative: [1] Prescription prescribed recently is not at pharmacy AND [2] triager has access to patient's EMR AND [3] prescription is recorded in the EMR    Protocols used: MEDICATION REFILL AND RENEWAL CALL-A-AH

## 2023-03-10 ENCOUNTER — TELEPHONE (OUTPATIENT)
Dept: FAMILY MEDICINE | Facility: CLINIC | Age: 23
End: 2023-03-10

## 2023-03-20 NOTE — TELEPHONE ENCOUNTER
Put in providers bin she is out of office until 03/20/23.  Sarah Winchester   Purple Team      Reason for Call:  Form, our goal is to have forms completed with 72 hours, however, some forms may require a visit or additional information.    Type of letter, form or note:  Home Health Certification    Who is the form from?: Home care    Where did the form come from: Patient or family brought in       What clinic location was the form placed at?: [facility]    Where the form was placed: Given to physician    What number is listed as a contact on the form?: [phone]       Additional comments: Mail back in envelope provided    Call taken on 3/10/2023 at 1:01 PM by Sarah Winchester        
Put signed form in envelope provided and sent to stat scanning.  Sarah Winchester   Purple Team    
no blurred vision L/no blurred vision R/reading and distance glasses

## 2023-03-30 ENCOUNTER — TRANSFERRED RECORDS (OUTPATIENT)
Dept: HEALTH INFORMATION MANAGEMENT | Facility: CLINIC | Age: 23
End: 2023-03-30

## 2023-04-16 ENCOUNTER — HEALTH MAINTENANCE LETTER (OUTPATIENT)
Age: 23
End: 2023-04-16

## 2023-05-02 DIAGNOSIS — Z30.9 ENCOUNTER FOR CONTRACEPTIVE MANAGEMENT, UNSPECIFIED TYPE: ICD-10-CM

## 2023-05-02 RX ORDER — LEVONORGESTREL AND ETHINYL ESTRADIOL 0.15-0.03
KIT ORAL
Qty: 91 TABLET | Refills: 1 | Status: SHIPPED | OUTPATIENT
Start: 2023-05-02 | End: 2023-10-18

## 2023-05-17 ENCOUNTER — MYC REFILL (OUTPATIENT)
Dept: FAMILY MEDICINE | Facility: CLINIC | Age: 23
End: 2023-05-17
Payer: COMMERCIAL

## 2023-05-17 DIAGNOSIS — F41.1 GENERALIZED ANXIETY DISORDER: ICD-10-CM

## 2023-05-17 RX ORDER — LORAZEPAM 0.5 MG/1
0.5 TABLET ORAL EVERY 6 HOURS PRN
Qty: 20 TABLET | Refills: 0 | Status: SHIPPED | OUTPATIENT
Start: 2023-05-17

## 2023-05-17 RX ORDER — TOPIRAMATE 25 MG/1
1 TABLET, FILM COATED ORAL
COMMUNITY
Start: 2023-02-28 | End: 2024-04-26

## 2023-05-17 RX ORDER — TOPIRAMATE 50 MG/1
TABLET, FILM COATED ORAL
COMMUNITY
Start: 2023-05-01 | End: 2024-04-26

## 2023-05-17 RX ORDER — NALTREXONE HYDROCHLORIDE 50 MG/1
1 TABLET, FILM COATED ORAL
COMMUNITY
Start: 2023-02-28 | End: 2024-01-22

## 2023-05-18 ENCOUNTER — TRANSFERRED RECORDS (OUTPATIENT)
Dept: HEALTH INFORMATION MANAGEMENT | Facility: CLINIC | Age: 23
End: 2023-05-18
Payer: COMMERCIAL

## 2023-08-07 ENCOUNTER — OFFICE VISIT (OUTPATIENT)
Dept: ORTHOPEDICS | Facility: CLINIC | Age: 23
End: 2023-08-07
Payer: COMMERCIAL

## 2023-08-07 DIAGNOSIS — M67.431 GANGLION CYST OF DORSUM OF RIGHT WRIST: Primary | ICD-10-CM

## 2023-08-07 PROCEDURE — 99207 PR NO CHARGE LOS: CPT | Performed by: PEDIATRICS

## 2023-08-07 NOTE — LETTER
8/7/2023         RE: Cher Ibarra  [address] [address]        Dear Colleague,    Thank you for referring your patient, Cher Ibarra, to the [facility] [place]. Please see a copy of my visit note below.    Pt was scheduled for Ganglion Cyst. They would like to follow-up with Dr. Dutta, as he was the Ortho Surgeon who had previously seen them for this issue and excision. Pt. Was not seen by physician today.  Marvel Moya, LAT, ATC      As noted by rooming staff.  Cher has a focal area of prominence/swelling dorsal ulnar right wrist, very briefly visualized. Also with some posterolateral ankle swelling/prominence.  In discussing with Cher and mother, offered referral on to discuss with Dr Dutta, who had performed previous ganglion excision. They desired this, referral placed.  No charge for visit with me today.    Pineda Stein DO, CAQ      Again, thank you for allowing me to participate in the care of your patient.        Sincerely,        Pineda Stein DO

## 2023-08-07 NOTE — PROGRESS NOTES
As noted by rooming staff.  Cher has a focal area of prominence/swelling dorsal ulnar right wrist, very briefly visualized. Also with some posterolateral ankle swelling/prominence.  In discussing with Cher and mother, offered referral on to discuss with Dr Dutta, who had performed previous ganglion excision. They desired this, referral placed.  No charge for visit with me today.    Pineda Stein, DO, CAQ

## 2023-08-07 NOTE — PROGRESS NOTES
Pt was scheduled for Ganglion Cyst. They would like to follow-up with Dr. Dutta, as he was the Ortho Surgeon who had previously seen them for this issue and excision. Pt. Was not seen by physician today.  ALEIDA Dave, ATC

## 2023-08-16 ENCOUNTER — MYC MEDICAL ADVICE (OUTPATIENT)
Dept: FAMILY MEDICINE | Facility: CLINIC | Age: 23
End: 2023-08-16
Payer: COMMERCIAL

## 2023-08-16 DIAGNOSIS — H69.92 DYSFUNCTION OF LEFT EUSTACHIAN TUBE: ICD-10-CM

## 2023-08-16 RX ORDER — FLUTICASONE PROPIONATE 50 MCG
1-2 SPRAY, SUSPENSION (ML) NASAL DAILY
Qty: 16 ML | Refills: 3 | Status: SHIPPED | OUTPATIENT
Start: 2023-08-16

## 2023-08-16 NOTE — TELEPHONE ENCOUNTER
Pending Prescriptions:                       Disp   Refills    fluticasone (FLONASE) 50 MCG/ACT nasal sp*16 mL  3            Sig: Spray 1-2 sprays into both nostrils daily    Simin Becerra CMA

## 2023-08-21 ENCOUNTER — OFFICE VISIT (OUTPATIENT)
Dept: ORTHOPEDICS | Facility: CLINIC | Age: 23
End: 2023-08-21
Attending: PEDIATRICS
Payer: COMMERCIAL

## 2023-08-21 VITALS — BODY MASS INDEX: 39.67 KG/M2 | HEIGHT: 58 IN | RESPIRATION RATE: 18 BRPM | WEIGHT: 189 LBS

## 2023-08-21 DIAGNOSIS — M67.431 GANGLION CYST OF DORSUM OF RIGHT WRIST: ICD-10-CM

## 2023-08-21 PROCEDURE — 99243 OFF/OP CNSLTJ NEW/EST LOW 30: CPT | Performed by: ORTHOPAEDIC SURGERY

## 2023-08-21 NOTE — LETTER
8/21/2023         RE: Cher Ibarra  [address] [id number]        Dear Colleague,    Thank you for referring your patient, Cher Ibarra, to the [facility]. Please see a copy of my visit note below.    Cher Ibarra is a 23 year old female who is seen in consultation at the request of Dr. Stein for right wrist lump, and also lumps at the lateral ankles.  She has Down's syndrome.   She has previously had cyst excision from the left wrist and right lateral ankle on 1/10/2020.  Mother reports Cher is bothered by the right wrist lump in the ankles.  She is indicating no pain today.    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 [facility]  codeine            Reactions:            Not likely to be effective due to CYP2D6 status            traMADol            Reactions:            Not likely to be effective due to CYP2D6 status            HYDROcodone            Reactions:            Not likely to be effective due to CYP2D6 status            oxyCO     Down's syndrome 11/22/2010    Followed by [facility] clinic yearly     GERD (gastroesophageal reflux disease)      Hidradenitis suppurativa 11/09/2018     Keratosis pilaris      Obesity 03/05/2018     Social phobia 06/14/2022     Strabismus      Trichotillomania      Zoophobia 06/14/2022       Past Surgical History:   Procedure Laterality Date     AS EXCIS PRIMARY GANGLION WRIST  01/2020    wrist and ankle     ESOPHAGOSCOPY, GASTROSCOPY, DUODENOSCOPY (EGD), COMBINED N/A 12/11/2020    Procedure: ESOPHAGOGASTRODUODENOSCOPY, WITH BIOPSY;  Surgeon: Antolin Tenorio MD;  Location:  OR     ESOPHAGOSCOPY, GASTROSCOPY, DUODENOSCOPY  (EGD), COMBINED N/A 12/22/2022    Procedure: ESOPHAGOGASTRODUODENOSCOPY, WITH BIOPSY;  Surgeon: Rafy Stafford MD;  Location: [facility]     EXAM UNDER ANESTHESIA PELVIC N/A 10/1/2021    Procedure: EXAM UNDER ANESTHESIA, PELVIS, pap smear, breast exam, administration of flu vaccine, excision of blackheads in groin;  Surgeon: Haily Taylor MD;  Location: [facility] OR     MAMMOPLASTY REDUCTION       REPAIR ATRIAL SEPTAL DEFECT      age 1     REPAIR VENTRICULAR SEPTAL DEFECT      age 1     TONSILLECTOMY & ADENOIDECTOMY      AGE 3       Family History   Problem Relation Age of Onset     Cerebrovascular Disease Mother      Depression Mother      Anxiety Disorder Mother      Obesity Mother      Hyperlipidemia Mother      Asthma Mother      Anesthesia Reaction Maternal Grandmother      Prostate Cancer Maternal Grandfather      Other Cancer Maternal Grandfather      Diabetes Other      Substance Abuse Other      C.A.D. No family hx of      Hypertension No family hx of      Breast Cancer No family hx of        Social History     Socioeconomic History     Marital status: Single     Spouse name: Not on file     Number of children: 0     Years of education: Not on file     Highest education level: Not on file   Occupational History     Employer: STUDENT   Tobacco Use     Smoking status: Never     Smokeless tobacco: Never   Vaping Use     Vaping Use: Never used   Substance and Sexual Activity     Alcohol use: No     Drug use: No     Sexual activity: Never   Other Topics Concern     Parent/sibling w/ CABG, MI or angioplasty before 65F 55M? No   Social History Narrative     Not on file     Social Determinants of Health     Financial Resource Strain: Not on file   Food Insecurity: Not on file   Transportation Needs: Not on file   Physical Activity: Not on file   Stress: Not on file   Social Connections: Not on file   Intimate Partner Violence: Not on file   Housing Stability: Not on file       Current Outpatient Medications    Medication Sig Dispense Refill     ammonium lactate (AMLACTIN) 12 % external cream Apply topically 2 times daily as needed for dry skin       cetirizine (ZYRTEC) 10 MG tablet Take 1 tablet (10 mg) by mouth every evening 90 tablet 1     Cholecalciferol (VITAMIN D3) 50 MCG (2000 UT) TABS TAKE 1 TABLET BY MOUTH DAILY 90 tablet 1     clotrimazole (LOTRIMIN) 1 % external cream Apply topically every morning 30 g 1     fluticasone (FLONASE) 50 MCG/ACT nasal spray Spray 1-2 sprays into both nostrils daily 16 mL 3     folic acid (FOLVITE) 1 MG tablet TAKE 1 TABLET(1 MG) BY MOUTH DAILY 90 tablet 1     levonorgestrel-ethinyl estradiol (SEASONALE) 0.15-0.03 MG tablet TAKE ONE TABLET BY MOUTH EVERY DAY 91 tablet 1     lidocaine-prilocaine (LIDOPRIL) 2.5-2.5 % kit Use as directed 1 kit 0     LORazepam (ATIVAN) 0.5 MG tablet Take 1 tablet (0.5 mg) by mouth every 6 hours as needed for anxiety 20 tablet 0     melatonin 5 MG tablet None Entered       mupirocin (BACTROBAN) 2 % external ointment APPLY EXTERNALLY TO THE AFFECTED AREA THREE TIMES DAILY AS NEEDED 22 g 0     naltrexone (DEPADE/REVIA) 50 MG tablet Take 1 tablet by mouth daily at 2 pm       naproxen (NAPROSYN) 500 MG tablet Take 1 tablet (500 mg) by mouth 2 times daily as needed 60 tablet 1     Omega-3 Fatty Acids (OMEGA-3 FISH OIL PO) Take 1 g by mouth daily       omeprazole (PRILOSEC) 20 MG DR capsule Take 1 capsule (20 mg) by mouth daily 90 capsule 3     sertraline (ZOLOFT) 100 MG tablet Take 100 mg by mouth daily       topiramate (TOPAMAX) 25 MG tablet Take 1 tablet by mouth 2 times daily       topiramate (TOPAMAX) 50 MG tablet        triamcinolone (ARISTOCORT HP) 0.5 % external cream APPLY EXTERNALLY TO THE AFFECTED AREA TWICE DAILY AS DIRECTED 30 g 0     vitamin D3 (CHOLECALCIFEROL) 50 mcg (2000 units) tablet Take 2 tablets (100 mcg) by mouth daily 180 tablet 3       Allergies   Allergen Reactions     Azithromycin Hives     Codeine      Hydrocodone      Oxycodone   "    Seasonal Allergies      Tramadol      Zithromax [Azithromycin Dihydrate]        REVIEW OF SYSTEMS:  CONSTITUTIONAL:  NEGATIVE for fever, chills, change in weight, not feeling tired  SKIN:  NEGATIVE for worrisome rashes, no skin lumps, no skin ulcers and no non-healing wounds  EYES:  NEGATIVE for vision changes or irritation.  ENT/MOUTH:  NEGATIVE.  No hearing loss, no hoarseness, no difficulty swallowing.  RESP:  NEGATIVE. No cough or shortness of breath.  CV:  NEGATIVE for chest pain, palpitations or peripheral edema  GI:  NEGATIVE for nausea, abdominal pain, heartburn, or change in bowel habits  :  Negative. No dysuria, no hematuria  MUSCULOSKELETAL:  See HPI above  NEURO:  NEGATIVE . No headaches, no dizziness,  no numbness  ENDOCRINE:  NEGATIVE for temperature intolerance, skin/hair changes  HEME/ALLERGY/IMMUNE:  NEGATIVE for bleeding problems  PSYCHIATRIC:  NEGATIVE. no anxiety, no depression.     Exam:  Vitals: Resp 18   Ht 1.473 m (4' 10\")   Wt 85.7 kg (189 lb)   BMI 39.50 kg/m    BMI= Body mass index is 39.5 kg/m .  Constitutional:  healthy, alert and no distress  Neuro: Alert and Oriented x 3, no focal defects.  Psych: Affect normal   Respiratory: Breathing not labored.  Cardiovascular: normal peripheral pulses  Lymph: no adenopathy  Skin: No rashes,worrisome lesions or skin problems  The right wrist shows a small lump near the distal radial ulnar joint dorsally.  It is in an odd position for a typical ganglion cyst.  It could be a cyst from the distal radial ulnar joint or may be lipoma.  It is not tender to palpation.  Both ankles show lumps of the lateral aspect of the ankle just posterior to the lateral malleolus.  It appears to be subcutaneous fatty tissue rather than true ganglion cysts.    Assessment:  right dorsal wrist mass may be ganglion cyst or lipoma near the distal radial ulnar joint.  Ankle lumps are most likely normal subcutaneous fatty tissue.    Plan: We will observe all of these " for now as they are not appearing to cause current pain.  If she has significant problems in the future we could consider excision, but it would require general anesthetic most likely at a hospital setting given her Down syndrome.  Previous outpatient surgery was done at [facility]    Again, thank you for allowing me to participate in the care of your patient.        Sincerely,        Rigo Dutta MD

## 2023-08-21 NOTE — PROGRESS NOTES
Cher Ibarra is a 23 year old female who is seen in consultation at the request of Dr. Stein for right wrist lump, and also lumps at the lateral ankles.  She has Down's syndrome.   She has previously had cyst excision from the left wrist and right lateral ankle on 1/10/2020.  Mother reports Cher is bothered by the right wrist lump in the ankles.  She is indicating no pain today.    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 [facility]  codeine            Reactions:            Not likely to be effective due to CYP2D6 status            traMADol            Reactions:            Not likely to be effective due to CYP2D6 status            HYDROcodone            Reactions:            Not likely to be effective due to CYP2D6 status            oxyCO    Down's syndrome 11/22/2010    Followed by [facility] yearly    GERD (gastroesophageal reflux disease)     Hidradenitis suppurativa 11/09/2018    Keratosis pilaris     Obesity 03/05/2018    Social phobia 06/14/2022    Strabismus     Trichotillomania     Zoophobia 06/14/2022       Past Surgical History:   Procedure Laterality Date    AS EXCIS PRIMARY GANGLION WRIST  01/2020    wrist and ankle    ESOPHAGOSCOPY, GASTROSCOPY, DUODENOSCOPY (EGD), COMBINED N/A 12/11/2020    Procedure: ESOPHAGOGASTRODUODENOSCOPY, WITH BIOPSY;  Surgeon: Antolin Tenorio MD;  Location: [facility] OR    ESOPHAGOSCOPY, GASTROSCOPY, DUODENOSCOPY (EGD), COMBINED N/A 12/22/2022    Procedure: ESOPHAGOGASTRODUODENOSCOPY, WITH BIOPSY;  Surgeon: Rafy Stafford MD;  Location: [facility]    EXAM UNDER ANESTHESIA PELVIC N/A 10/1/2021    Procedure: EXAM UNDER ANESTHESIA, PELVIS, pap smear, breast exam, administration of flu  vaccine, excision of blackheads in groin;  Surgeon: Haily Taylor MD;  Location: [facility] OR    MAMMOPLASTY REDUCTION      REPAIR ATRIAL SEPTAL DEFECT      age 1    REPAIR VENTRICULAR SEPTAL DEFECT      age 1    TONSILLECTOMY & ADENOIDECTOMY      AGE 3       Family History   Problem Relation Age of Onset    Cerebrovascular Disease Mother     Depression Mother     Anxiety Disorder Mother     Obesity Mother     Hyperlipidemia Mother     Asthma Mother     Anesthesia Reaction Maternal Grandmother     Prostate Cancer Maternal Grandfather     Other Cancer Maternal Grandfather     Diabetes Other     Substance Abuse Other     C.A.D. No family hx of     Hypertension No family hx of     Breast Cancer No family hx of        Social History     Socioeconomic History    Marital status: Single     Spouse name: Not on file    Number of children: 0    Years of education: Not on file    Highest education level: Not on file   Occupational History     Employer: STUDENT   Tobacco Use    Smoking status: Never    Smokeless tobacco: Never   Vaping Use    Vaping Use: Never used   Substance and Sexual Activity    Alcohol use: No    Drug use: No    Sexual activity: Never   Other Topics Concern    Parent/sibling w/ CABG, MI or angioplasty before 65F 55M? No   Social History Narrative    Not on file     Social Determinants of Health     Financial Resource Strain: Not on file   Food Insecurity: Not on file   Transportation Needs: Not on file   Physical Activity: Not on file   Stress: Not on file   Social Connections: Not on file   Intimate Partner Violence: Not on file   Housing Stability: Not on file       Current Outpatient Medications   Medication Sig Dispense Refill    ammonium lactate (AMLACTIN) 12 % external cream Apply topically 2 times daily as needed for dry skin      cetirizine (ZYRTEC) 10 MG tablet Take 1 tablet (10 mg) by mouth every evening 90 tablet 1    Cholecalciferol (VITAMIN D3) 50 MCG (2000 UT) TABS TAKE 1 TABLET BY MOUTH  DAILY 90 tablet 1    clotrimazole (LOTRIMIN) 1 % external cream Apply topically every morning 30 g 1    fluticasone (FLONASE) 50 MCG/ACT nasal spray Spray 1-2 sprays into both nostrils daily 16 mL 3    folic acid (FOLVITE) 1 MG tablet TAKE 1 TABLET(1 MG) BY MOUTH DAILY 90 tablet 1    levonorgestrel-ethinyl estradiol (SEASONALE) 0.15-0.03 MG tablet TAKE ONE TABLET BY MOUTH EVERY DAY 91 tablet 1    lidocaine-prilocaine (LIDOPRIL) 2.5-2.5 % kit Use as directed 1 kit 0    LORazepam (ATIVAN) 0.5 MG tablet Take 1 tablet (0.5 mg) by mouth every 6 hours as needed for anxiety 20 tablet 0    melatonin 5 MG tablet None Entered      mupirocin (BACTROBAN) 2 % external ointment APPLY EXTERNALLY TO THE AFFECTED AREA THREE TIMES DAILY AS NEEDED 22 g 0    naltrexone (DEPADE/REVIA) 50 MG tablet Take 1 tablet by mouth daily at 2 pm      naproxen (NAPROSYN) 500 MG tablet Take 1 tablet (500 mg) by mouth 2 times daily as needed 60 tablet 1    Omega-3 Fatty Acids (OMEGA-3 FISH OIL PO) Take 1 g by mouth daily      omeprazole (PRILOSEC) 20 MG DR capsule Take 1 capsule (20 mg) by mouth daily 90 capsule 3    sertraline (ZOLOFT) 100 MG tablet Take 100 mg by mouth daily      topiramate (TOPAMAX) 25 MG tablet Take 1 tablet by mouth 2 times daily      topiramate (TOPAMAX) 50 MG tablet       triamcinolone (ARISTOCORT HP) 0.5 % external cream APPLY EXTERNALLY TO THE AFFECTED AREA TWICE DAILY AS DIRECTED 30 g 0    vitamin D3 (CHOLECALCIFEROL) 50 mcg (2000 units) tablet Take 2 tablets (100 mcg) by mouth daily 180 tablet 3       Allergies   Allergen Reactions    Azithromycin Hives    Codeine     Hydrocodone     Oxycodone     Seasonal Allergies     Tramadol     Zithromax [Azithromycin Dihydrate]        REVIEW OF SYSTEMS:  CONSTITUTIONAL:  NEGATIVE for fever, chills, change in weight, not feeling tired  SKIN:  NEGATIVE for worrisome rashes, no skin lumps, no skin ulcers and no non-healing wounds  EYES:  NEGATIVE for vision changes or  "irritation.  ENT/MOUTH:  NEGATIVE.  No hearing loss, no hoarseness, no difficulty swallowing.  RESP:  NEGATIVE. No cough or shortness of breath.  CV:  NEGATIVE for chest pain, palpitations or peripheral edema  GI:  NEGATIVE for nausea, abdominal pain, heartburn, or change in bowel habits  :  Negative. No dysuria, no hematuria  MUSCULOSKELETAL:  See HPI above  NEURO:  NEGATIVE . No headaches, no dizziness,  no numbness  ENDOCRINE:  NEGATIVE for temperature intolerance, skin/hair changes  HEME/ALLERGY/IMMUNE:  NEGATIVE for bleeding problems  PSYCHIATRIC:  NEGATIVE. no anxiety, no depression.     Exam:  Vitals: Resp 18   Ht 1.473 m (4' 10\")   Wt 85.7 kg (189 lb)   BMI 39.50 kg/m    BMI= Body mass index is 39.5 kg/m .  Constitutional:  healthy, alert and no distress  Neuro: Alert and Oriented x 3, no focal defects.  Psych: Affect normal   Respiratory: Breathing not labored.  Cardiovascular: normal peripheral pulses  Lymph: no adenopathy  Skin: No rashes,worrisome lesions or skin problems  The right wrist shows a small lump near the distal radial ulnar joint dorsally.  It is in an odd position for a typical ganglion cyst.  It could be a cyst from the distal radial ulnar joint or may be lipoma.  It is not tender to palpation.  Both ankles show lumps of the lateral aspect of the ankle just posterior to the lateral malleolus.  It appears to be subcutaneous fatty tissue rather than true ganglion cysts.    Assessment:  right dorsal wrist mass may be ganglion cyst or lipoma near the distal radial ulnar joint.  Ankle lumps are most likely normal subcutaneous fatty tissue.    Plan: We will observe all of these for now as they are not appearing to cause current pain.  If she has significant problems in the future we could consider excision, but it would require general anesthetic most likely at a hospital setting given her Down syndrome.  Previous outpatient surgery was done at [facility]  "

## 2023-09-28 ENCOUNTER — MYC MEDICAL ADVICE (OUTPATIENT)
Dept: FAMILY MEDICINE | Facility: CLINIC | Age: 23
End: 2023-09-28
Payer: COMMERCIAL

## 2023-10-18 DIAGNOSIS — Z30.9 ENCOUNTER FOR CONTRACEPTIVE MANAGEMENT, UNSPECIFIED TYPE: ICD-10-CM

## 2023-10-18 RX ORDER — LEVONORGESTREL AND ETHINYL ESTRADIOL 0.15-0.03
KIT ORAL
Qty: 91 TABLET | Refills: 0 | Status: SHIPPED | OUTPATIENT
Start: 2023-10-18 | End: 2024-01-30

## 2024-01-16 ENCOUNTER — TRANSFERRED RECORDS (OUTPATIENT)
Dept: HEALTH INFORMATION MANAGEMENT | Facility: CLINIC | Age: 24
End: 2024-01-16
Payer: COMMERCIAL

## 2024-01-16 LAB
HBA1C MFR BLD: 5.4 % (ref 4.8–5.6)
TSH SERPL-ACNC: 3.75 UIU/ML (ref 0.45–4.5)

## 2024-01-19 ENCOUNTER — TRANSFERRED RECORDS (OUTPATIENT)
Dept: HEALTH INFORMATION MANAGEMENT | Facility: CLINIC | Age: 24
End: 2024-01-19
Payer: COMMERCIAL

## 2024-01-22 DIAGNOSIS — N94.6 DYSMENORRHEA: ICD-10-CM

## 2024-01-22 RX ORDER — NAPROXEN 500 MG/1
500 TABLET ORAL 2 TIMES DAILY PRN
Qty: 60 TABLET | Refills: 0 | Status: SHIPPED | OUTPATIENT
Start: 2024-01-22

## 2024-01-30 DIAGNOSIS — Z30.9 ENCOUNTER FOR CONTRACEPTIVE MANAGEMENT, UNSPECIFIED TYPE: ICD-10-CM

## 2024-01-30 RX ORDER — LEVONORGESTREL AND ETHINYL ESTRADIOL 0.15-0.03
KIT ORAL
Qty: 91 TABLET | Refills: 0 | Status: SHIPPED | OUTPATIENT
Start: 2024-01-30

## 2024-03-05 ENCOUNTER — MYC MEDICAL ADVICE (OUTPATIENT)
Dept: FAMILY MEDICINE | Facility: CLINIC | Age: 24
End: 2024-03-05
Payer: COMMERCIAL

## 2024-03-06 ENCOUNTER — MYC MEDICAL ADVICE (OUTPATIENT)
Dept: FAMILY MEDICINE | Facility: CLINIC | Age: 24
End: 2024-03-06
Payer: COMMERCIAL

## 2024-04-26 ENCOUNTER — MYC REFILL (OUTPATIENT)
Dept: FAMILY MEDICINE | Facility: CLINIC | Age: 24
End: 2024-04-26
Payer: COMMERCIAL

## 2024-04-26 DIAGNOSIS — Z30.9 ENCOUNTER FOR CONTRACEPTIVE MANAGEMENT, UNSPECIFIED TYPE: ICD-10-CM

## 2024-04-26 DIAGNOSIS — F41.1 GENERALIZED ANXIETY DISORDER: ICD-10-CM

## 2024-04-26 DIAGNOSIS — N94.6 DYSMENORRHEA: ICD-10-CM

## 2024-04-26 RX ORDER — LORAZEPAM 0.5 MG/1
0.5 TABLET ORAL EVERY 6 HOURS PRN
OUTPATIENT
Start: 2024-04-26

## 2024-04-26 RX ORDER — NAPROXEN 500 MG/1
500 TABLET ORAL 2 TIMES DAILY PRN
OUTPATIENT
Start: 2024-04-26

## 2024-04-26 RX ORDER — LEVONORGESTREL AND ETHINYL ESTRADIOL 0.15-0.03
KIT ORAL
OUTPATIENT
Start: 2024-04-26

## 2024-05-02 DIAGNOSIS — Z30.9 ENCOUNTER FOR CONTRACEPTIVE MANAGEMENT, UNSPECIFIED TYPE: ICD-10-CM

## 2024-05-02 RX ORDER — LEVONORGESTREL AND ETHINYL ESTRADIOL 0.15-0.03
KIT ORAL
OUTPATIENT
Start: 2024-05-02

## 2024-06-23 ENCOUNTER — HEALTH MAINTENANCE LETTER (OUTPATIENT)
Age: 24
End: 2024-06-23

## 2024-07-25 NOTE — TELEPHONE ENCOUNTER
"Called [facility] and was informed unable to do procedure at that location. Called and spoke with Veronica from [place] [facility] at [phone] and requested how to get a patient scheduled for lab work with conscious sedation or nitrous oxide for routine labs and vaccines. Veronica informed with labs we are able to schedule but needing lab orders faxed but with vaccines needing to be seen by physician. Faxed over lab orders along with Hep A order. Veronica then gave [facility] number to call for Physician access. Called physician access and was informed needing to have PCP contact physician access to speak with a physician over there. Called mom and gave update and asked mom best days and times and informed we are trying to get appointments done same day but there is a chance it may not be able to be same day. Mom informed \"any day except wednesdays and preferrably in the morning. Will be going on vacation from the 14th-18th and is wondering if appointment can be made after the 18th. Marika Altamirano MA    " [As Noted in HPI] : as noted in HPI [Negative] : Heme/Lymph

## 2024-09-04 ENCOUNTER — MYC REFILL (OUTPATIENT)
Dept: FAMILY MEDICINE | Facility: CLINIC | Age: 24
End: 2024-09-04
Payer: COMMERCIAL

## 2024-09-04 DIAGNOSIS — K21.9 GASTROESOPHAGEAL REFLUX DISEASE WITHOUT ESOPHAGITIS: ICD-10-CM

## 2025-05-10 ENCOUNTER — HEALTH MAINTENANCE LETTER (OUTPATIENT)
Age: 25
End: 2025-05-10

## 2025-07-12 ENCOUNTER — HEALTH MAINTENANCE LETTER (OUTPATIENT)
Age: 25
End: 2025-07-12

## (undated) DEVICE — PAD CHUX UNDERPAD 30X36" P3036C

## (undated) DEVICE — Device

## (undated) DEVICE — KIT CONNECTOR FOR OLYMPUS ENDOSCOPES DEFENDO 100310

## (undated) DEVICE — LINEN GOWN X4 5410

## (undated) DEVICE — ENDO TUBING CO2 SMARTCAP STERILE DISP 100145CO2EXT

## (undated) DEVICE — STRAP KNEE/BODY 31143004

## (undated) DEVICE — KIT ENDO FIRST STEP DISINFECTANT 200ML W/POUCH EP-4

## (undated) DEVICE — PAD CHUX UNDERPAD 23X24" 7136

## (undated) DEVICE — LINEN TOWEL PACK X5 5464

## (undated) DEVICE — ENDO FORCEP ENDOJAW BIOPSY 2.8MMX230CM FB-220U

## (undated) DEVICE — SOL WATER IRRIG 1000ML BOTTLE 2F7114

## (undated) DEVICE — ENDO CAP AND TUBING STERILE FOR ENDOGATOR  100130

## (undated) DEVICE — ENDO BITE BLOCK ADULT OMNI-BLOC

## (undated) DEVICE — PACK ENDOSCOPY GI CUSTOM UMMC

## (undated) RX ORDER — CARBOPROST TROMETHAMINE 250 UG/ML
INJECTION, SOLUTION INTRAMUSCULAR
Status: DISPENSED
Start: 2021-10-01

## (undated) RX ORDER — EPHEDRINE SULFATE 50 MG/ML
INJECTION, SOLUTION INTRAMUSCULAR; INTRAVENOUS; SUBCUTANEOUS
Status: DISPENSED
Start: 2022-12-22

## (undated) RX ORDER — EPHEDRINE SULFATE 50 MG/ML
INJECTION, SOLUTION INTRAMUSCULAR; INTRAVENOUS; SUBCUTANEOUS
Status: DISPENSED
Start: 2021-10-01

## (undated) RX ORDER — MIDAZOLAM HYDROCHLORIDE 2 MG/ML
SYRUP ORAL
Status: DISPENSED
Start: 2021-10-01

## (undated) RX ORDER — LIDOCAINE HYDROCHLORIDE 20 MG/ML
INJECTION, SOLUTION EPIDURAL; INFILTRATION; INTRACAUDAL; PERINEURAL
Status: DISPENSED
Start: 2021-10-01

## (undated) RX ORDER — MIDAZOLAM HYDROCHLORIDE 2 MG/ML
SYRUP ORAL
Status: DISPENSED
Start: 2020-12-11

## (undated) RX ORDER — ONDANSETRON 2 MG/ML
INJECTION INTRAMUSCULAR; INTRAVENOUS
Status: DISPENSED
Start: 2021-10-01

## (undated) RX ORDER — MIDAZOLAM HYDROCHLORIDE 2 MG/ML
SYRUP ORAL
Status: DISPENSED
Start: 2022-12-22

## (undated) RX ORDER — LIDOCAINE HYDROCHLORIDE 20 MG/ML
INJECTION, SOLUTION EPIDURAL; INFILTRATION; INTRACAUDAL; PERINEURAL
Status: DISPENSED
Start: 2020-12-11

## (undated) RX ORDER — PROPOFOL 10 MG/ML
INJECTION, EMULSION INTRAVENOUS
Status: DISPENSED
Start: 2020-12-11

## (undated) RX ORDER — GLYCOPYRROLATE 0.2 MG/ML
INJECTION INTRAMUSCULAR; INTRAVENOUS
Status: DISPENSED
Start: 2021-10-01

## (undated) RX ORDER — DEXAMETHASONE SODIUM PHOSPHATE 4 MG/ML
INJECTION, SOLUTION INTRA-ARTICULAR; INTRALESIONAL; INTRAMUSCULAR; INTRAVENOUS; SOFT TISSUE
Status: DISPENSED
Start: 2021-10-01

## (undated) RX ORDER — FENTANYL CITRATE 50 UG/ML
INJECTION, SOLUTION INTRAMUSCULAR; INTRAVENOUS
Status: DISPENSED
Start: 2021-10-01

## (undated) RX ORDER — KETOROLAC TROMETHAMINE 30 MG/ML
INJECTION, SOLUTION INTRAMUSCULAR; INTRAVENOUS
Status: DISPENSED
Start: 2021-10-01

## (undated) RX ORDER — ONDANSETRON 2 MG/ML
INJECTION INTRAMUSCULAR; INTRAVENOUS
Status: DISPENSED
Start: 2020-12-11

## (undated) RX ORDER — METHYLERGONOVINE MALEATE 0.2 MG/ML
INJECTION INTRAVENOUS
Status: DISPENSED
Start: 2021-10-01

## (undated) RX ORDER — PROPOFOL 10 MG/ML
INJECTION, EMULSION INTRAVENOUS
Status: DISPENSED
Start: 2021-10-01

## (undated) RX ORDER — FENTANYL CITRATE 50 UG/ML
INJECTION, SOLUTION INTRAMUSCULAR; INTRAVENOUS
Status: DISPENSED
Start: 2020-12-11